# Patient Record
Sex: FEMALE | Race: WHITE | NOT HISPANIC OR LATINO | Employment: FULL TIME | URBAN - METROPOLITAN AREA
[De-identification: names, ages, dates, MRNs, and addresses within clinical notes are randomized per-mention and may not be internally consistent; named-entity substitution may affect disease eponyms.]

---

## 2023-07-31 ENCOUNTER — TELEPHONE (OUTPATIENT)
Dept: PRIMARY CARE | Facility: CLINIC | Age: 48
End: 2023-07-31
Payer: COMMERCIAL

## 2023-07-31 DIAGNOSIS — F41.9 ANXIETY: ICD-10-CM

## 2023-07-31 NOTE — TELEPHONE ENCOUNTER
Rx Refill Request Telephone Encounter    Name:  Leyda Blair  :  [id number]  Medication Name:    BUPROPION 150MG Q/D 90 DAY SUPPLY   VENLAFAXINE 150MG Q/D 90 DAY SUPPLY   Specific Pharmacy location:  [facility]/   Date of last appointment:  2023  Best number to reach patient:  [phone]

## 2023-08-01 RX ORDER — VENLAFAXINE HYDROCHLORIDE 150 MG/1
150 CAPSULE, EXTENDED RELEASE ORAL DAILY
COMMUNITY
Start: 2023-07-15 | End: 2023-08-01 | Stop reason: SDUPTHER

## 2023-08-01 RX ORDER — BUPROPION HYDROCHLORIDE 150 MG/1
150 TABLET ORAL EVERY MORNING
COMMUNITY
Start: 2023-07-29 | End: 2023-08-01 | Stop reason: SDUPTHER

## 2023-08-01 RX ORDER — VENLAFAXINE HYDROCHLORIDE 150 MG/1
150 CAPSULE, EXTENDED RELEASE ORAL DAILY
Qty: 90 CAPSULE | Refills: 1 | Status: SHIPPED | OUTPATIENT
Start: 2023-08-01 | End: 2024-02-12

## 2023-08-01 RX ORDER — BUPROPION HYDROCHLORIDE 150 MG/1
150 TABLET ORAL EVERY MORNING
Qty: 90 TABLET | Refills: 1 | Status: SHIPPED | OUTPATIENT
Start: 2023-08-01 | End: 2024-02-26

## 2023-08-24 ENCOUNTER — OFFICE VISIT (OUTPATIENT)
Dept: PRIMARY CARE | Facility: CLINIC | Age: 48
End: 2023-08-24
Payer: COMMERCIAL

## 2023-08-24 ENCOUNTER — LAB (OUTPATIENT)
Dept: LAB | Facility: LAB | Age: 48
End: 2023-08-24
Payer: COMMERCIAL

## 2023-08-24 VITALS
SYSTOLIC BLOOD PRESSURE: 121 MMHG | DIASTOLIC BLOOD PRESSURE: 82 MMHG | TEMPERATURE: 98.1 F | OXYGEN SATURATION: 97 % | BODY MASS INDEX: 28.16 KG/M2 | HEIGHT: 65 IN | HEART RATE: 89 BPM | WEIGHT: 169 LBS

## 2023-08-24 DIAGNOSIS — R31.9 HEMATURIA, UNSPECIFIED TYPE: ICD-10-CM

## 2023-08-24 DIAGNOSIS — R10.2 PELVIC PAIN: ICD-10-CM

## 2023-08-24 DIAGNOSIS — R10.2 PELVIC PAIN: Primary | ICD-10-CM

## 2023-08-24 LAB
ALANINE AMINOTRANSFERASE (SGPT) (U/L) IN SER/PLAS: 14 U/L (ref 7–45)
ALBUMIN (G/DL) IN SER/PLAS: 4 G/DL (ref 3.4–5)
ALKALINE PHOSPHATASE (U/L) IN SER/PLAS: 74 U/L (ref 33–110)
ANION GAP IN SER/PLAS: 13 MMOL/L (ref 10–20)
ASPARTATE AMINOTRANSFERASE (SGOT) (U/L) IN SER/PLAS: 16 U/L (ref 9–39)
BILIRUBIN TOTAL (MG/DL) IN SER/PLAS: 0.3 MG/DL (ref 0–1.2)
CALCIUM (MG/DL) IN SER/PLAS: 8.7 MG/DL (ref 8.6–10.3)
CARBON DIOXIDE, TOTAL (MMOL/L) IN SER/PLAS: 25 MMOL/L (ref 21–32)
CHLORIDE (MMOL/L) IN SER/PLAS: 105 MMOL/L (ref 98–107)
CREATININE (MG/DL) IN SER/PLAS: 0.73 MG/DL (ref 0.5–1.05)
ERYTHROCYTE DISTRIBUTION WIDTH (RATIO) BY AUTOMATED COUNT: 16.4 % (ref 11.5–14.5)
ERYTHROCYTE MEAN CORPUSCULAR HEMOGLOBIN CONCENTRATION (G/DL) BY AUTOMATED: 29.6 G/DL (ref 32–36)
ERYTHROCYTE MEAN CORPUSCULAR VOLUME (FL) BY AUTOMATED COUNT: 83 FL (ref 80–100)
ERYTHROCYTES (10*6/UL) IN BLOOD BY AUTOMATED COUNT: 4.09 X10E12/L (ref 4–5.2)
GFR FEMALE: >90 ML/MIN/1.73M2
GLUCOSE (MG/DL) IN SER/PLAS: 91 MG/DL (ref 74–99)
HEMATOCRIT (%) IN BLOOD BY AUTOMATED COUNT: 34.1 % (ref 36–46)
HEMOGLOBIN (G/DL) IN BLOOD: 10.1 G/DL (ref 12–16)
LEUKOCYTES (10*3/UL) IN BLOOD BY AUTOMATED COUNT: 7.5 X10E9/L (ref 4.4–11.3)
PLATELETS (10*3/UL) IN BLOOD AUTOMATED COUNT: 400 X10E9/L (ref 150–450)
POC APPEARANCE, URINE: CLEAR
POC BILIRUBIN, URINE: NEGATIVE
POC BLOOD, URINE: NEGATIVE
POC COLOR, URINE: YELLOW
POC GLUCOSE, URINE: NEGATIVE MG/DL
POC KETONES, URINE: NEGATIVE MG/DL
POC LEUKOCYTES, URINE: NEGATIVE
POC NITRITE,URINE: NEGATIVE
POC PH, URINE: 5.5 PH
POC PROTEIN, URINE: NEGATIVE MG/DL
POC SPECIFIC GRAVITY, URINE: 1.01
POC UROBILINOGEN, URINE: 0.2 EU/DL
POTASSIUM (MMOL/L) IN SER/PLAS: 4.4 MMOL/L (ref 3.5–5.3)
PROTEIN TOTAL: 6.3 G/DL (ref 6.4–8.2)
SODIUM (MMOL/L) IN SER/PLAS: 139 MMOL/L (ref 136–145)
UREA NITROGEN (MG/DL) IN SER/PLAS: 15 MG/DL (ref 6–23)

## 2023-08-24 PROCEDURE — 85027 COMPLETE CBC AUTOMATED: CPT

## 2023-08-24 PROCEDURE — 80053 COMPREHEN METABOLIC PANEL: CPT

## 2023-08-24 PROCEDURE — 36415 COLL VENOUS BLD VENIPUNCTURE: CPT

## 2023-08-24 PROCEDURE — 1036F TOBACCO NON-USER: CPT | Performed by: FAMILY MEDICINE

## 2023-08-24 PROCEDURE — 87086 URINE CULTURE/COLONY COUNT: CPT

## 2023-08-24 PROCEDURE — 81003 URINALYSIS AUTO W/O SCOPE: CPT | Performed by: FAMILY MEDICINE

## 2023-08-24 PROCEDURE — 99214 OFFICE O/P EST MOD 30 MIN: CPT | Performed by: FAMILY MEDICINE

## 2023-08-24 RX ORDER — OMEPRAZOLE 20 MG/1
CAPSULE, DELAYED RELEASE ORAL 2 TIMES DAILY
COMMUNITY

## 2023-08-24 ASSESSMENT — ENCOUNTER SYMPTOMS
CHILLS: 0
CONSTIPATION: 1
FEVER: 0
BACK PAIN: 0

## 2023-08-24 NOTE — PROGRESS NOTES
Subjective   Patient ID: Leyda Blair is a 47 y.o. female who presents for Abdominal Pain and Blood in Urine. States her sx's onset 4-5 months ago with with lower pelvic pressure and dysuria; did online research regarding low-carb diets and came across info about it changing the ph level. Pt then began an OTC D-Mannose w/ cranberry for a few weeks and her sx's resolved. Diet has went back to normal and two weeks ago she felt bloated with lower abdominal pains which lasted 1.5 wks. Sx's then resolved up until yesterday; states she was fine all day; went to the gym to exercise and began feeling the urge to urinate. Saw blood in her urine and the lower abdominal pain has returned but not as severe. H/O partial hysterectomy 10 yrs ago.     Blood in urine 2-3 times   Some dysuria yesterday,    No vaginal itching or discharge     Hysterectomy,  for DUB,    Still with ovaries   No kidney stones     BMS every other day              Review of Systems   Constitutional:  Negative for chills and fever.   Gastrointestinal:  Positive for constipation.   Musculoskeletal:  Negative for back pain.       Objective   There were no vitals taken for this visit.    Physical Exam  Constitutional:       Appearance: Normal appearance. She is well-developed.   Cardiovascular:      Rate and Rhythm: Normal rate and regular rhythm.      Heart sounds: Normal heart sounds. No murmur heard.  Pulmonary:      Effort: Pulmonary effort is normal.      Breath sounds: Normal breath sounds.   Abdominal:      General: Abdomen is flat. Bowel sounds are normal.      Palpations: Abdomen is soft. There is no mass.      Hernia: No hernia is present.      Comments: Moderate tenderness left lower quadrant, mid abdomen and suprapubic area no CVAT  No rebound or guarding   Neurological:      General: No focal deficit present.      Mental Status: She is alert.         Assessment/Plan   Problem List Items Addressed This Visit    None  Visit Diagnoses        Pelvic pain    -  Primary    Relevant Orders    POCT UA Automated manually resulted    CBC    Comprehensive Metabolic Panel    CT abdomen pelvis wo IV contrast    Hematuria, unspecified type        Relevant Orders    POCT UA Automated manually resulted    CBC    Comprehensive Metabolic Panel    CT abdomen pelvis wo IV contrast    Urine Culture

## 2023-08-24 NOTE — PATIENT INSTRUCTIONS
Possibly passed kidney stone, ovarian cyst  We will send culture to rule out infection    Get your blood work as ordered.  You should hear from our office with results whether they are normal are not within a few days.  Please call the office if you do not hear from us.     Azo   :  pyridium      Tylenol or motrin for pain     Severe pain to ER

## 2023-08-25 LAB — URINE CULTURE: NORMAL

## 2023-09-11 ENCOUNTER — TELEPHONE (OUTPATIENT)
Dept: PRIMARY CARE | Facility: CLINIC | Age: 48
End: 2023-09-11
Payer: COMMERCIAL

## 2023-09-11 DIAGNOSIS — D64.9 ANEMIA, UNSPECIFIED TYPE: Primary | ICD-10-CM

## 2023-09-11 NOTE — TELEPHONE ENCOUNTER
Pt given ct results.  Pt to take miralax 3-5 times daily, if that does not work, next is a strong laxative andor a enema.  Pt expressed understanding.   
Jayla Liu

## 2023-09-12 ENCOUNTER — LAB (OUTPATIENT)
Dept: LAB | Facility: LAB | Age: 48
End: 2023-09-12
Payer: COMMERCIAL

## 2023-09-12 DIAGNOSIS — D64.9 ANEMIA, UNSPECIFIED TYPE: ICD-10-CM

## 2023-09-12 LAB
COBALAMIN (VITAMIN B12) (PG/ML) IN SER/PLAS: 765 PG/ML (ref 211–911)
ERYTHROCYTE DISTRIBUTION WIDTH (RATIO) BY AUTOMATED COUNT: 17.2 % (ref 11.5–14.5)
ERYTHROCYTE MEAN CORPUSCULAR HEMOGLOBIN CONCENTRATION (G/DL) BY AUTOMATED: 29 G/DL (ref 32–36)
ERYTHROCYTE MEAN CORPUSCULAR VOLUME (FL) BY AUTOMATED COUNT: 85 FL (ref 80–100)
ERYTHROCYTES (10*6/UL) IN BLOOD BY AUTOMATED COUNT: 4.31 X10E12/L (ref 4–5.2)
HEMATOCRIT (%) IN BLOOD BY AUTOMATED COUNT: 36.5 % (ref 36–46)
HEMOGLOBIN (G/DL) IN BLOOD: 10.6 G/DL (ref 12–16)
IRON (UG/DL) IN SER/PLAS: 45 UG/DL (ref 35–150)
IRON BINDING CAPACITY (UG/DL) IN SER/PLAS: 363 UG/DL (ref 240–445)
IRON SATURATION (%) IN SER/PLAS: 12 % (ref 25–45)
LEUKOCYTES (10*3/UL) IN BLOOD BY AUTOMATED COUNT: 5.8 X10E9/L (ref 4.4–11.3)
PLATELETS (10*3/UL) IN BLOOD AUTOMATED COUNT: 376 X10E9/L (ref 150–450)
THYROTROPIN (MIU/L) IN SER/PLAS BY DETECTION LIMIT <= 0.05 MIU/L: 2.42 MIU/L (ref 0.44–3.98)

## 2023-09-12 PROCEDURE — 85027 COMPLETE CBC AUTOMATED: CPT

## 2023-09-12 PROCEDURE — 83550 IRON BINDING TEST: CPT

## 2023-09-12 PROCEDURE — 36415 COLL VENOUS BLD VENIPUNCTURE: CPT

## 2023-09-12 PROCEDURE — 83540 ASSAY OF IRON: CPT

## 2023-09-12 PROCEDURE — 82607 VITAMIN B-12: CPT

## 2023-09-12 PROCEDURE — 84443 ASSAY THYROID STIM HORMONE: CPT

## 2023-09-14 ENCOUNTER — TELEPHONE (OUTPATIENT)
Dept: PRIMARY CARE | Facility: CLINIC | Age: 48
End: 2023-09-14
Payer: COMMERCIAL

## 2023-09-14 NOTE — TELEPHONE ENCOUNTER
Result Communication    Resulted Orders   CBC   Result Value Ref Range    WBC 5.8 4.4 - 11.3 x10E9/L    RBC 4.31 4.00 - 5.20 x10E12/L    Hemoglobin 10.6 (L) 12.0 - 16.0 g/dL    Hematocrit 36.5 36.0 - 46.0 %    MCV 85 80 - 100 fL    MCHC 29.0 (L) 32.0 - 36.0 g/dL    Platelets 376 150 - 450 x10E9/L    RDW 17.2 (H) 11.5 - 14.5 %   Iron and TIBC   Result Value Ref Range    Iron 45 35 - 150 ug/dL    TIBC 363 240 - 445 ug/dL    Iron Saturation 12 (L) 25 - 45 %   Thyroid Stimulating Hormone   Result Value Ref Range    TSH 2.42 0.44 - 3.98 mIU/L      Comment:       TSH testing is performed using different testing    methodology at [facility] than at other    [facility]. Direct result comparisons should    only be made within the same method.       5:06 PM  Emily Kohler MD  [place] [id number] Clinical Support Staff  Still slight anemia but improving, thyroid is okay, B12 pending, would just recommend increasing oral intake of iron in diet    Results were successfully communicated with the patient and they acknowledged their understanding.

## 2023-10-12 DIAGNOSIS — D50.9 IRON DEFICIENCY ANEMIA, UNSPECIFIED IRON DEFICIENCY ANEMIA TYPE: Primary | ICD-10-CM

## 2023-10-13 ENCOUNTER — LAB (OUTPATIENT)
Dept: LAB | Facility: LAB | Age: 48
End: 2023-10-13
Payer: COMMERCIAL

## 2023-10-13 DIAGNOSIS — D50.9 IRON DEFICIENCY ANEMIA, UNSPECIFIED IRON DEFICIENCY ANEMIA TYPE: ICD-10-CM

## 2023-10-13 LAB
ERYTHROCYTE [DISTWIDTH] IN BLOOD BY AUTOMATED COUNT: 16.7 % (ref 11.5–14.5)
HCT VFR BLD AUTO: 35.9 % (ref 36–46)
HGB BLD-MCNC: 10.4 G/DL (ref 12–16)
IRON SATN MFR SERPL: 9 % (ref 25–45)
IRON SERPL-MCNC: 31 UG/DL (ref 35–150)
MCH RBC QN AUTO: 24.7 PG (ref 26–34)
MCHC RBC AUTO-ENTMCNC: 29 G/DL (ref 32–36)
MCV RBC AUTO: 85 FL (ref 80–100)
NRBC BLD-RTO: 0 /100 WBCS (ref 0–0)
PLATELET # BLD AUTO: 321 X10*3/UL (ref 150–450)
PMV BLD AUTO: 11.1 FL (ref 7.5–11.5)
RBC # BLD AUTO: 4.21 X10*6/UL (ref 4–5.2)
TIBC SERPL-MCNC: 344 UG/DL (ref 240–445)
UIBC SERPL-MCNC: 313 UG/DL (ref 110–370)
WBC # BLD AUTO: 4.8 X10*3/UL (ref 4.4–11.3)

## 2023-10-13 PROCEDURE — 36415 COLL VENOUS BLD VENIPUNCTURE: CPT

## 2023-10-13 PROCEDURE — 85027 COMPLETE CBC AUTOMATED: CPT

## 2023-10-13 PROCEDURE — 83540 ASSAY OF IRON: CPT

## 2023-10-13 PROCEDURE — 83550 IRON BINDING TEST: CPT

## 2023-10-16 ENCOUNTER — PATIENT MESSAGE (OUTPATIENT)
Dept: PRIMARY CARE | Facility: CLINIC | Age: 48
End: 2023-10-16
Payer: COMMERCIAL

## 2023-10-16 DIAGNOSIS — D64.9 ANEMIA, UNSPECIFIED TYPE: Primary | ICD-10-CM

## 2023-10-16 RX ORDER — FERROUS SULFATE 325(65) MG
65 TABLET ORAL
Qty: 30 TABLET | Refills: 0 | Status: SHIPPED | OUTPATIENT
Start: 2023-10-16 | End: 2024-02-19 | Stop reason: ALTCHOICE

## 2023-10-16 NOTE — PROGRESS NOTES
Still with some mild iron deficiency, recommend daily iron, prescription sent and repeat blood work in 1 month

## 2023-11-03 ENCOUNTER — LAB (OUTPATIENT)
Dept: LAB | Facility: LAB | Age: 48
End: 2023-11-03
Payer: COMMERCIAL

## 2023-11-03 DIAGNOSIS — D64.9 ANEMIA, UNSPECIFIED TYPE: ICD-10-CM

## 2023-11-03 LAB
ERYTHROCYTE [DISTWIDTH] IN BLOOD BY AUTOMATED COUNT: 17.2 % (ref 11.5–14.5)
HCT VFR BLD AUTO: 37.1 % (ref 36–46)
HGB BLD-MCNC: 10.9 G/DL (ref 12–16)
IRON SATN MFR SERPL: 11 % (ref 25–45)
IRON SERPL-MCNC: 39 UG/DL (ref 35–150)
MCH RBC QN AUTO: 25.8 PG (ref 26–34)
MCHC RBC AUTO-ENTMCNC: 29.4 G/DL (ref 32–36)
MCV RBC AUTO: 88 FL (ref 80–100)
NRBC BLD-RTO: 0 /100 WBCS (ref 0–0)
PLATELET # BLD AUTO: 316 X10*3/UL (ref 150–450)
RBC # BLD AUTO: 4.23 X10*6/UL (ref 4–5.2)
TIBC SERPL-MCNC: 352 UG/DL (ref 240–445)
UIBC SERPL-MCNC: 313 UG/DL (ref 110–370)
WBC # BLD AUTO: 4.7 X10*3/UL (ref 4.4–11.3)

## 2023-11-03 PROCEDURE — 83540 ASSAY OF IRON: CPT

## 2023-11-03 PROCEDURE — 85027 COMPLETE CBC AUTOMATED: CPT

## 2023-11-03 PROCEDURE — 36415 COLL VENOUS BLD VENIPUNCTURE: CPT

## 2023-11-03 PROCEDURE — 83550 IRON BINDING TEST: CPT

## 2023-11-06 DIAGNOSIS — D64.9 ANEMIA, UNSPECIFIED TYPE: Primary | ICD-10-CM

## 2023-11-27 ENCOUNTER — APPOINTMENT (OUTPATIENT)
Dept: HEMATOLOGY/ONCOLOGY | Facility: CLINIC | Age: 48
End: 2023-11-27
Payer: COMMERCIAL

## 2023-11-27 ENCOUNTER — OFFICE VISIT (OUTPATIENT)
Dept: HEMATOLOGY/ONCOLOGY | Facility: CLINIC | Age: 48
End: 2023-11-27
Payer: COMMERCIAL

## 2023-11-27 VITALS
WEIGHT: 162.7 LBS | SYSTOLIC BLOOD PRESSURE: 144 MMHG | OXYGEN SATURATION: 100 % | RESPIRATION RATE: 14 BRPM | HEIGHT: 64 IN | DIASTOLIC BLOOD PRESSURE: 90 MMHG | BODY MASS INDEX: 27.78 KG/M2 | TEMPERATURE: 97 F | HEART RATE: 79 BPM

## 2023-11-27 DIAGNOSIS — D50.9 IRON DEFICIENCY ANEMIA, UNSPECIFIED IRON DEFICIENCY ANEMIA TYPE: Primary | ICD-10-CM

## 2023-11-27 DIAGNOSIS — K90.9 MALABSORPTION OF IRON (HHS-HCC): ICD-10-CM

## 2023-11-27 DIAGNOSIS — D64.9 ANEMIA, UNSPECIFIED TYPE: ICD-10-CM

## 2023-11-27 PROCEDURE — 99215 OFFICE O/P EST HI 40 MIN: CPT | Performed by: PHYSICIAN ASSISTANT

## 2023-11-27 PROCEDURE — 1036F TOBACCO NON-USER: CPT | Performed by: PHYSICIAN ASSISTANT

## 2023-11-27 PROCEDURE — 99205 OFFICE O/P NEW HI 60 MIN: CPT | Performed by: PHYSICIAN ASSISTANT

## 2023-11-27 RX ORDER — FERROUS SULFATE 300 MG/5ML
10 LIQUID (ML) ORAL DAILY
COMMUNITY
End: 2024-02-19 | Stop reason: ALTCHOICE

## 2023-11-27 RX ORDER — ALBUTEROL SULFATE 0.83 MG/ML
3 SOLUTION RESPIRATORY (INHALATION) AS NEEDED
Status: CANCELLED | OUTPATIENT
Start: 2023-12-01

## 2023-11-27 RX ORDER — HEPARIN 100 UNIT/ML
500 SYRINGE INTRAVENOUS AS NEEDED
Status: CANCELLED | OUTPATIENT
Start: 2023-11-27

## 2023-11-27 RX ORDER — DIPHENHYDRAMINE HYDROCHLORIDE 50 MG/ML
50 INJECTION INTRAMUSCULAR; INTRAVENOUS AS NEEDED
Status: CANCELLED | OUTPATIENT
Start: 2023-12-01

## 2023-11-27 RX ORDER — EPINEPHRINE 0.3 MG/.3ML
0.3 INJECTION SUBCUTANEOUS EVERY 5 MIN PRN
Status: CANCELLED | OUTPATIENT
Start: 2023-12-01

## 2023-11-27 RX ORDER — FAMOTIDINE 10 MG/ML
20 INJECTION INTRAVENOUS ONCE AS NEEDED
Status: CANCELLED | OUTPATIENT
Start: 2023-12-01

## 2023-11-27 RX ORDER — HEPARIN SODIUM,PORCINE/PF 10 UNIT/ML
50 SYRINGE (ML) INTRAVENOUS AS NEEDED
Status: CANCELLED | OUTPATIENT
Start: 2023-11-27

## 2023-11-27 ASSESSMENT — ENCOUNTER SYMPTOMS
DIARRHEA: 0
APPETITE CHANGE: 0
WOUND: 0
HEADACHES: 1
ABDOMINAL DISTENTION: 0
FATIGUE: 1
CONFUSION: 0
FREQUENCY: 0
UNEXPECTED WEIGHT CHANGE: 1
HEMATOLOGIC/LYMPHATIC NEGATIVE: 1
DIAPHORESIS: 0
DECREASED CONCENTRATION: 0
NAUSEA: 0
VOMITING: 0
DIFFICULTY URINATING: 0
HEMATURIA: 0
DIZZINESS: 0
CONSTIPATION: 0
NECK PAIN: 0
EYE PROBLEMS: 0
SCLERAL ICTERUS: 0
RESPIRATORY NEGATIVE: 1
NECK STIFFNESS: 0
SPEECH DIFFICULTY: 0
CARDIOVASCULAR NEGATIVE: 1
NUMBNESS: 0
CHILLS: 0
ABDOMINAL PAIN: 0

## 2023-11-27 ASSESSMENT — PAIN SCALES - GENERAL: PAINLEVEL: 0-NO PAIN

## 2023-11-27 NOTE — PROGRESS NOTES
Patient ID: Leyda Blair is a 48 y.o. female.  Primary care physician: Dr. Emily Kohler    Interval History:   Ms. Blair is a 48 years-old female. She is referred to [facility] today for the evaluation and treatment of iron deficiency anemia. Patient states that she had Crissy-En-Y about 10 years ago (2013). States that she has had IV iron infusion in 2019. Patient states that she started oral iron tablets, initially once per day in August and on Nov, 3rd 2023, she switched to oral liquid iron. States that she had partial hysterectomy about 12 years ago due to dysfunctional uterine bleeding. States that she had colonoscopy done less than 10 years ago. Reports that no polyps were removed.     Selected labs prior to the initial consult:   8/25/22: WBC 4.6, Hgb 12.8, MCV 91, platelets 302,000  9/12/23: WBC 5.8, Hgb 10.6, MCV 85, platelets 376,000, iron saturation 12%  10/13/23: WBC 4.8, Hgb 10.4, MCV 85, platelets 321,000, iron saturation 9%  11/3/23: WBC 4.7, Hgb 10.9, MCV 88, platelets 316,000, , iron saturation 11%    8/24/23: CT abdomen/pelvis without contrast  IMPRESSION:  No acute findings in the abdomen and pelvis. Large amount of colonic stool.    Subjective      -Complains of fatigue and headaches.   -Denies any fever. Admits night sweats for one month. Admits 10 lbs weight loss since August.   -Denies feeling any lumps or bumps.   -Denies any chest pain, cough. Complains of HAYES.   -Denies any melena or hematochezia.   -Denies any abnormal vaginal bleeding     Social History     Tobacco Use    Smoking status: Never    Smokeless tobacco: Never   Substance Use Topics    Alcohol use: Not Currently    Drug use: Never        Objective    BMI:   Body mass index is 27.85 kg/m².     Vitals:   Visit Vitals  /90   Pulse 79   Temp 36.1 °C (97 °F)   Resp 14   SpO2 100%   Smoking Status Never        Review of Systems   Constitutional:  Positive for fatigue and unexpected weight change. Negative for appetite  change, chills and diaphoresis.        Lost 10 lbs since Aug, 2023   HENT:   Negative for lump/mass, mouth sores and nosebleeds.    Eyes:  Negative for eye problems and icterus.   Respiratory: Negative.          Admits HAYES   Cardiovascular: Negative.    Gastrointestinal:  Negative for abdominal distention, abdominal pain, constipation, diarrhea, nausea and vomiting.   Genitourinary:  Negative for bladder incontinence, difficulty urinating, frequency and hematuria.    Musculoskeletal:  Negative for gait problem, neck pain and neck stiffness.   Skin:  Negative for itching, rash and wound.   Neurological:  Positive for headaches. Negative for dizziness, gait problem, numbness and speech difficulty.   Hematological: Negative.    Psychiatric/Behavioral:  Negative for confusion and decreased concentration.         Physical Exam  HENT:      Head: Normocephalic.      Nose: Nose normal.      Mouth/Throat:      Mouth: Mucous membranes are moist.   Eyes:      Pupils: Pupils are equal, round, and reactive to light.   Cardiovascular:      Rate and Rhythm: Normal rate and regular rhythm.      Pulses: Normal pulses.      Heart sounds: Normal heart sounds.   Pulmonary:      Effort: Pulmonary effort is normal.      Breath sounds: Normal breath sounds.   Abdominal:      General: Bowel sounds are normal.      Palpations: Abdomen is soft.   Musculoskeletal:         General: Normal range of motion.   Skin:     General: Skin is warm and dry.   Neurological:      General: No focal deficit present.      Mental Status: She is alert and oriented to person, place, and time.   Psychiatric:         Mood and Affect: Mood normal.         Behavior: Behavior normal.       Labs:  Lab Results   Component Value Date    WBC 4.7 11/03/2023    NEUTROABS 1.97 08/25/2022    IGABSOL 0.01 05/23/2022    LYMPHSABS 1.94 08/25/2022    MONOSABS 0.48 08/25/2022    EOSABS 0.12 08/25/2022    BASOSABS 0.05 08/25/2022    RBC 4.23 11/03/2023    MCV 88 11/03/2023     MCHC 29.4 (L) 11/03/2023    HGB 10.9 (L) 11/03/2023    HCT 37.1 11/03/2023     11/03/2023       Lab Results   Component Value Date    CREATININE 0.73 08/24/2023    BUN 15 08/24/2023    EGFR 107 05/23/2022     08/24/2023    K 4.4 08/24/2023     08/24/2023    CO2 25 08/24/2023      Lab Results   Component Value Date    ALT 14 08/24/2023    AST 16 08/24/2023    ALKPHOS 74 08/24/2023    BILITOT 0.3 08/24/2023      Lab Results   Component Value Date    TSH 2.42 09/12/2023     Lab Results   Component Value Date    TSH 2.42 09/12/2023    S2YPXGQ 7.0 08/25/2022     Lab Results   Component Value Date    IRON 39 11/03/2023    TIBC 352 11/03/2023      Lab Results   Component Value Date    [id number] 765 09/12/2023             Performance Status:  Symptomatic; fully ambulatory    Assessment/Plan      Iron deficiency anemia  -Most recent labs on 11/3/23: Hgb 10.9, MCV 88, iron saturation 11%  -Patient had gastric bypass surgery about 10 years ago and had IV iron infusion in 2019  -had partial hysterectomy about 12 years ago. Denies any abnormal vaginal bleeding.   -States that she had colonoscopy done less than 10 years ago. No polyps were removed.   -Due to malabsorption of oral iron, we will schedule patient for IV Venofer infusion 300 mg x 3 times.   -She will have labs done 4 weeks after the last dose of IV iron.   -I will see her back a few days after labs done.       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

## 2023-11-27 NOTE — PATIENT INSTRUCTIONS
We will schedule you for IV iron infusion at [facility]    I will see you back in 2 months to follow up with labs done a few days prior to the next visit.

## 2023-12-01 ENCOUNTER — INFUSION (OUTPATIENT)
Dept: HEMATOLOGY/ONCOLOGY | Facility: CLINIC | Age: 48
End: 2023-12-01
Payer: COMMERCIAL

## 2023-12-01 VITALS
TEMPERATURE: 97.9 F | WEIGHT: 166.89 LBS | RESPIRATION RATE: 18 BRPM | SYSTOLIC BLOOD PRESSURE: 124 MMHG | BODY MASS INDEX: 28.56 KG/M2 | DIASTOLIC BLOOD PRESSURE: 81 MMHG | HEART RATE: 96 BPM

## 2023-12-01 DIAGNOSIS — K90.9 MALABSORPTION OF IRON (HHS-HCC): ICD-10-CM

## 2023-12-01 DIAGNOSIS — D50.9 IRON DEFICIENCY ANEMIA, UNSPECIFIED IRON DEFICIENCY ANEMIA TYPE: ICD-10-CM

## 2023-12-01 DIAGNOSIS — D50.8 OTHER IRON DEFICIENCY ANEMIA: ICD-10-CM

## 2023-12-01 PROCEDURE — 2500000004 HC RX 250 GENERAL PHARMACY W/ HCPCS (ALT 636 FOR OP/ED): Performed by: PHYSICIAN ASSISTANT

## 2023-12-01 PROCEDURE — 96375 TX/PRO/DX INJ NEW DRUG ADDON: CPT | Mod: INF

## 2023-12-01 PROCEDURE — 96374 THER/PROPH/DIAG INJ IV PUSH: CPT | Mod: INF

## 2023-12-01 PROCEDURE — 96365 THER/PROPH/DIAG IV INF INIT: CPT | Mod: INF

## 2023-12-01 RX ORDER — DIPHENHYDRAMINE HYDROCHLORIDE 50 MG/ML
50 INJECTION INTRAMUSCULAR; INTRAVENOUS AS NEEDED
Status: CANCELLED | OUTPATIENT
Start: 2023-12-05

## 2023-12-01 RX ORDER — ALBUTEROL SULFATE 0.83 MG/ML
3 SOLUTION RESPIRATORY (INHALATION) AS NEEDED
Status: CANCELLED | OUTPATIENT
Start: 2023-12-05

## 2023-12-01 RX ORDER — FAMOTIDINE 10 MG/ML
20 INJECTION INTRAVENOUS ONCE AS NEEDED
Status: CANCELLED | OUTPATIENT
Start: 2023-12-05

## 2023-12-01 RX ORDER — EPINEPHRINE 0.3 MG/.3ML
0.3 INJECTION SUBCUTANEOUS EVERY 5 MIN PRN
Status: DISCONTINUED | OUTPATIENT
Start: 2023-12-01 | End: 2023-12-01 | Stop reason: HOSPADM

## 2023-12-01 RX ORDER — HEPARIN 100 UNIT/ML
500 SYRINGE INTRAVENOUS AS NEEDED
OUTPATIENT
Start: 2023-12-01

## 2023-12-01 RX ORDER — ALBUTEROL SULFATE 0.83 MG/ML
3 SOLUTION RESPIRATORY (INHALATION) AS NEEDED
Status: DISCONTINUED | OUTPATIENT
Start: 2023-12-01 | End: 2023-12-01 | Stop reason: HOSPADM

## 2023-12-01 RX ORDER — HEPARIN SODIUM,PORCINE/PF 10 UNIT/ML
50 SYRINGE (ML) INTRAVENOUS AS NEEDED
Status: DISCONTINUED | OUTPATIENT
Start: 2023-12-01 | End: 2023-12-01 | Stop reason: HOSPADM

## 2023-12-01 RX ORDER — FAMOTIDINE 10 MG/ML
20 INJECTION INTRAVENOUS ONCE AS NEEDED
Status: DISCONTINUED | OUTPATIENT
Start: 2023-12-01 | End: 2023-12-01 | Stop reason: HOSPADM

## 2023-12-01 RX ORDER — EPINEPHRINE 0.3 MG/.3ML
0.3 INJECTION SUBCUTANEOUS EVERY 5 MIN PRN
Status: CANCELLED | OUTPATIENT
Start: 2023-12-05

## 2023-12-01 RX ORDER — HEPARIN SODIUM,PORCINE/PF 10 UNIT/ML
50 SYRINGE (ML) INTRAVENOUS AS NEEDED
OUTPATIENT
Start: 2023-12-01

## 2023-12-01 RX ORDER — DIPHENHYDRAMINE HYDROCHLORIDE 50 MG/ML
50 INJECTION INTRAMUSCULAR; INTRAVENOUS AS NEEDED
Status: DISCONTINUED | OUTPATIENT
Start: 2023-12-01 | End: 2023-12-01 | Stop reason: HOSPADM

## 2023-12-01 RX ORDER — HEPARIN 100 UNIT/ML
500 SYRINGE INTRAVENOUS AS NEEDED
Status: DISCONTINUED | OUTPATIENT
Start: 2023-12-01 | End: 2023-12-01 | Stop reason: HOSPADM

## 2023-12-01 RX ADMIN — IRON SUCROSE 300 MG: 20 INJECTION, SOLUTION INTRAVENOUS at 10:41

## 2023-12-01 ASSESSMENT — PAIN SCALES - GENERAL: PAINLEVEL: 0-NO PAIN

## 2023-12-05 ENCOUNTER — INFUSION (OUTPATIENT)
Dept: HEMATOLOGY/ONCOLOGY | Facility: CLINIC | Age: 48
End: 2023-12-05
Payer: COMMERCIAL

## 2023-12-05 VITALS
RESPIRATION RATE: 17 BRPM | OXYGEN SATURATION: 97 % | BODY MASS INDEX: 29.02 KG/M2 | TEMPERATURE: 98.8 F | WEIGHT: 169.97 LBS | DIASTOLIC BLOOD PRESSURE: 86 MMHG | HEART RATE: 91 BPM | HEIGHT: 64 IN | SYSTOLIC BLOOD PRESSURE: 129 MMHG

## 2023-12-05 DIAGNOSIS — K90.9 MALABSORPTION OF IRON (HHS-HCC): ICD-10-CM

## 2023-12-05 DIAGNOSIS — D50.9 IRON DEFICIENCY ANEMIA, UNSPECIFIED IRON DEFICIENCY ANEMIA TYPE: ICD-10-CM

## 2023-12-05 PROCEDURE — 2500000004 HC RX 250 GENERAL PHARMACY W/ HCPCS (ALT 636 FOR OP/ED): Performed by: PHYSICIAN ASSISTANT

## 2023-12-05 PROCEDURE — 96365 THER/PROPH/DIAG IV INF INIT: CPT | Mod: INF

## 2023-12-05 RX ORDER — EPINEPHRINE 0.3 MG/.3ML
0.3 INJECTION SUBCUTANEOUS EVERY 5 MIN PRN
Status: CANCELLED | OUTPATIENT
Start: 2023-12-09

## 2023-12-05 RX ORDER — FAMOTIDINE 10 MG/ML
20 INJECTION INTRAVENOUS ONCE AS NEEDED
Status: CANCELLED | OUTPATIENT
Start: 2023-12-09

## 2023-12-05 RX ORDER — DIPHENHYDRAMINE HYDROCHLORIDE 50 MG/ML
50 INJECTION INTRAMUSCULAR; INTRAVENOUS AS NEEDED
Status: DISCONTINUED | OUTPATIENT
Start: 2023-12-05 | End: 2023-12-05 | Stop reason: HOSPADM

## 2023-12-05 RX ORDER — ALBUTEROL SULFATE 0.83 MG/ML
3 SOLUTION RESPIRATORY (INHALATION) AS NEEDED
Status: DISCONTINUED | OUTPATIENT
Start: 2023-12-05 | End: 2023-12-05 | Stop reason: HOSPADM

## 2023-12-05 RX ORDER — FAMOTIDINE 10 MG/ML
20 INJECTION INTRAVENOUS ONCE AS NEEDED
Status: DISCONTINUED | OUTPATIENT
Start: 2023-12-05 | End: 2023-12-05 | Stop reason: HOSPADM

## 2023-12-05 RX ORDER — DIPHENHYDRAMINE HYDROCHLORIDE 50 MG/ML
50 INJECTION INTRAMUSCULAR; INTRAVENOUS AS NEEDED
Status: CANCELLED | OUTPATIENT
Start: 2023-12-09

## 2023-12-05 RX ORDER — ALBUTEROL SULFATE 0.83 MG/ML
3 SOLUTION RESPIRATORY (INHALATION) AS NEEDED
Status: CANCELLED | OUTPATIENT
Start: 2023-12-09

## 2023-12-05 RX ORDER — EPINEPHRINE 0.3 MG/.3ML
0.3 INJECTION SUBCUTANEOUS EVERY 5 MIN PRN
Status: DISCONTINUED | OUTPATIENT
Start: 2023-12-05 | End: 2023-12-05 | Stop reason: HOSPADM

## 2023-12-05 RX ADMIN — IRON SUCROSE 300 MG: 20 INJECTION, SOLUTION INTRAVENOUS at 15:41

## 2023-12-05 ASSESSMENT — PAIN SCALES - GENERAL: PAINLEVEL: 0-NO PAIN

## 2023-12-11 ENCOUNTER — INFUSION (OUTPATIENT)
Dept: HEMATOLOGY/ONCOLOGY | Facility: CLINIC | Age: 48
End: 2023-12-11
Payer: COMMERCIAL

## 2023-12-11 VITALS
TEMPERATURE: 98.8 F | SYSTOLIC BLOOD PRESSURE: 128 MMHG | HEART RATE: 88 BPM | DIASTOLIC BLOOD PRESSURE: 86 MMHG | WEIGHT: 167.11 LBS | BODY MASS INDEX: 28.6 KG/M2 | RESPIRATION RATE: 18 BRPM | OXYGEN SATURATION: 97 %

## 2023-12-11 DIAGNOSIS — K90.9 MALABSORPTION OF IRON (HHS-HCC): ICD-10-CM

## 2023-12-11 DIAGNOSIS — D50.9 IRON DEFICIENCY ANEMIA, UNSPECIFIED IRON DEFICIENCY ANEMIA TYPE: ICD-10-CM

## 2023-12-11 PROCEDURE — 2500000004 HC RX 250 GENERAL PHARMACY W/ HCPCS (ALT 636 FOR OP/ED): Performed by: PHYSICIAN ASSISTANT

## 2023-12-11 PROCEDURE — 96365 THER/PROPH/DIAG IV INF INIT: CPT | Mod: INF

## 2023-12-11 RX ORDER — FAMOTIDINE 10 MG/ML
20 INJECTION INTRAVENOUS ONCE AS NEEDED
OUTPATIENT
Start: 2023-12-13

## 2023-12-11 RX ORDER — EPINEPHRINE 0.3 MG/.3ML
0.3 INJECTION SUBCUTANEOUS EVERY 5 MIN PRN
OUTPATIENT
Start: 2023-12-13

## 2023-12-11 RX ORDER — DIPHENHYDRAMINE HYDROCHLORIDE 50 MG/ML
50 INJECTION INTRAMUSCULAR; INTRAVENOUS AS NEEDED
OUTPATIENT
Start: 2023-12-13

## 2023-12-11 RX ORDER — EPINEPHRINE 0.3 MG/.3ML
0.3 INJECTION SUBCUTANEOUS EVERY 5 MIN PRN
Status: DISCONTINUED | OUTPATIENT
Start: 2023-12-11 | End: 2023-12-11 | Stop reason: HOSPADM

## 2023-12-11 RX ORDER — DIPHENHYDRAMINE HYDROCHLORIDE 50 MG/ML
50 INJECTION INTRAMUSCULAR; INTRAVENOUS AS NEEDED
Status: DISCONTINUED | OUTPATIENT
Start: 2023-12-11 | End: 2023-12-11 | Stop reason: HOSPADM

## 2023-12-11 RX ORDER — ALBUTEROL SULFATE 0.83 MG/ML
3 SOLUTION RESPIRATORY (INHALATION) AS NEEDED
Status: DISCONTINUED | OUTPATIENT
Start: 2023-12-11 | End: 2023-12-11 | Stop reason: HOSPADM

## 2023-12-11 RX ORDER — FAMOTIDINE 10 MG/ML
20 INJECTION INTRAVENOUS ONCE AS NEEDED
Status: DISCONTINUED | OUTPATIENT
Start: 2023-12-11 | End: 2023-12-11 | Stop reason: HOSPADM

## 2023-12-11 RX ORDER — ALBUTEROL SULFATE 0.83 MG/ML
3 SOLUTION RESPIRATORY (INHALATION) AS NEEDED
OUTPATIENT
Start: 2023-12-13

## 2023-12-11 RX ADMIN — IRON SUCROSE 300 MG: 20 INJECTION, SOLUTION INTRAVENOUS at 15:54

## 2023-12-11 ASSESSMENT — PAIN SCALES - GENERAL: PAINLEVEL: 0-NO PAIN

## 2023-12-12 ENCOUNTER — APPOINTMENT (OUTPATIENT)
Dept: HEMATOLOGY/ONCOLOGY | Facility: CLINIC | Age: 48
End: 2023-12-12
Payer: COMMERCIAL

## 2023-12-29 ENCOUNTER — LAB (OUTPATIENT)
Dept: LAB | Facility: LAB | Age: 48
End: 2023-12-29
Payer: COMMERCIAL

## 2023-12-29 DIAGNOSIS — D50.9 IRON DEFICIENCY ANEMIA, UNSPECIFIED IRON DEFICIENCY ANEMIA TYPE: ICD-10-CM

## 2023-12-29 LAB
BASOPHILS # BLD AUTO: 0.04 X10*3/UL (ref 0–0.1)
BASOPHILS NFR BLD AUTO: 0.9 %
EOSINOPHIL # BLD AUTO: 0.1 X10*3/UL (ref 0–0.7)
EOSINOPHIL NFR BLD AUTO: 2.2 %
ERYTHROCYTE [DISTWIDTH] IN BLOOD BY AUTOMATED COUNT: 17 % (ref 11.5–14.5)
FERRITIN SERPL-MCNC: 115 NG/ML (ref 8–150)
HCT VFR BLD AUTO: 43.8 % (ref 36–46)
HGB BLD-MCNC: 13.4 G/DL (ref 12–16)
IMM GRANULOCYTES # BLD AUTO: 0.02 X10*3/UL (ref 0–0.7)
IMM GRANULOCYTES NFR BLD AUTO: 0.4 % (ref 0–0.9)
IRON SATN MFR SERPL: 19 % (ref 25–45)
IRON SERPL-MCNC: 54 UG/DL (ref 35–150)
LYMPHOCYTES # BLD AUTO: 1.77 X10*3/UL (ref 1.2–4.8)
LYMPHOCYTES NFR BLD AUTO: 38.7 %
MCH RBC QN AUTO: 27.7 PG (ref 26–34)
MCHC RBC AUTO-ENTMCNC: 30.6 G/DL (ref 32–36)
MCV RBC AUTO: 91 FL (ref 80–100)
MONOCYTES # BLD AUTO: 0.51 X10*3/UL (ref 0.1–1)
MONOCYTES NFR BLD AUTO: 11.2 %
NEUTROPHILS # BLD AUTO: 2.13 X10*3/UL (ref 1.2–7.7)
NEUTROPHILS NFR BLD AUTO: 46.6 %
NRBC BLD-RTO: 0 /100 WBCS (ref 0–0)
PLATELET # BLD AUTO: 236 X10*3/UL (ref 150–450)
RBC # BLD AUTO: 4.83 X10*6/UL (ref 4–5.2)
TIBC SERPL-MCNC: 291 UG/DL (ref 240–445)
UIBC SERPL-MCNC: 237 UG/DL (ref 110–370)
WBC # BLD AUTO: 4.6 X10*3/UL (ref 4.4–11.3)

## 2023-12-29 PROCEDURE — 83540 ASSAY OF IRON: CPT

## 2023-12-29 PROCEDURE — 83550 IRON BINDING TEST: CPT

## 2023-12-29 PROCEDURE — 82728 ASSAY OF FERRITIN: CPT

## 2023-12-29 PROCEDURE — 85025 COMPLETE CBC W/AUTO DIFF WBC: CPT

## 2023-12-29 PROCEDURE — 82607 VITAMIN B-12: CPT

## 2023-12-29 PROCEDURE — 36415 COLL VENOUS BLD VENIPUNCTURE: CPT

## 2023-12-30 LAB — VIT B12 SERPL-MCNC: 677 PG/ML (ref 211–911)

## 2024-01-22 DIAGNOSIS — D50.8 OTHER IRON DEFICIENCY ANEMIA: Primary | ICD-10-CM

## 2024-01-24 ENCOUNTER — LAB (OUTPATIENT)
Dept: LAB | Facility: LAB | Age: 49
End: 2024-01-24
Payer: COMMERCIAL

## 2024-01-24 DIAGNOSIS — D50.8 OTHER IRON DEFICIENCY ANEMIA: ICD-10-CM

## 2024-01-24 LAB
BASOPHILS # BLD AUTO: 0.06 X10*3/UL (ref 0–0.1)
BASOPHILS NFR BLD AUTO: 1.3 %
EOSINOPHIL # BLD AUTO: 0.17 X10*3/UL (ref 0–0.7)
EOSINOPHIL NFR BLD AUTO: 3.8 %
ERYTHROCYTE [DISTWIDTH] IN BLOOD BY AUTOMATED COUNT: 15.9 % (ref 11.5–14.5)
FERRITIN SERPL-MCNC: 59 NG/ML (ref 8–150)
HCT VFR BLD AUTO: 43.3 % (ref 36–46)
HGB BLD-MCNC: 13.5 G/DL (ref 12–16)
IMM GRANULOCYTES # BLD AUTO: 0 X10*3/UL (ref 0–0.7)
IMM GRANULOCYTES NFR BLD AUTO: 0 % (ref 0–0.9)
IRON SATN MFR SERPL: 41 % (ref 25–45)
IRON SERPL-MCNC: 112 UG/DL (ref 35–150)
LYMPHOCYTES # BLD AUTO: 1.99 X10*3/UL (ref 1.2–4.8)
LYMPHOCYTES NFR BLD AUTO: 44.1 %
MCH RBC QN AUTO: 28.5 PG (ref 26–34)
MCHC RBC AUTO-ENTMCNC: 31.2 G/DL (ref 32–36)
MCV RBC AUTO: 92 FL (ref 80–100)
MONOCYTES # BLD AUTO: 0.39 X10*3/UL (ref 0.1–1)
MONOCYTES NFR BLD AUTO: 8.6 %
NEUTROPHILS # BLD AUTO: 1.9 X10*3/UL (ref 1.2–7.7)
NEUTROPHILS NFR BLD AUTO: 42.2 %
NRBC BLD-RTO: 0 /100 WBCS (ref 0–0)
PLATELET # BLD AUTO: 266 X10*3/UL (ref 150–450)
RBC # BLD AUTO: 4.73 X10*6/UL (ref 4–5.2)
TIBC SERPL-MCNC: 274 UG/DL (ref 240–445)
UIBC SERPL-MCNC: 162 UG/DL (ref 110–370)
VIT B12 SERPL-MCNC: 554 PG/ML (ref 211–911)
WBC # BLD AUTO: 4.5 X10*3/UL (ref 4.4–11.3)

## 2024-01-24 PROCEDURE — 85025 COMPLETE CBC W/AUTO DIFF WBC: CPT

## 2024-01-24 PROCEDURE — 83550 IRON BINDING TEST: CPT

## 2024-01-24 PROCEDURE — 82607 VITAMIN B-12: CPT

## 2024-01-24 PROCEDURE — 36415 COLL VENOUS BLD VENIPUNCTURE: CPT

## 2024-01-24 PROCEDURE — 82728 ASSAY OF FERRITIN: CPT

## 2024-01-24 PROCEDURE — 83540 ASSAY OF IRON: CPT

## 2024-01-29 ENCOUNTER — OFFICE VISIT (OUTPATIENT)
Dept: HEMATOLOGY/ONCOLOGY | Facility: CLINIC | Age: 49
End: 2024-01-29
Payer: COMMERCIAL

## 2024-01-29 VITALS
TEMPERATURE: 97.7 F | RESPIRATION RATE: 14 BRPM | OXYGEN SATURATION: 95 % | SYSTOLIC BLOOD PRESSURE: 133 MMHG | WEIGHT: 164.02 LBS | DIASTOLIC BLOOD PRESSURE: 85 MMHG | HEART RATE: 87 BPM | BODY MASS INDEX: 28.07 KG/M2

## 2024-01-29 DIAGNOSIS — D50.9 IRON DEFICIENCY ANEMIA, UNSPECIFIED IRON DEFICIENCY ANEMIA TYPE: ICD-10-CM

## 2024-01-29 PROCEDURE — 1036F TOBACCO NON-USER: CPT | Performed by: PHYSICIAN ASSISTANT

## 2024-01-29 PROCEDURE — 99214 OFFICE O/P EST MOD 30 MIN: CPT | Performed by: PHYSICIAN ASSISTANT

## 2024-01-29 ASSESSMENT — ENCOUNTER SYMPTOMS
EYE PROBLEMS: 0
CONFUSION: 0
VOMITING: 0
SPEECH DIFFICULTY: 0
NECK PAIN: 0
HEMATURIA: 0
RESPIRATORY NEGATIVE: 1
UNEXPECTED WEIGHT CHANGE: 0
CONSTIPATION: 0
ABDOMINAL PAIN: 0
NAUSEA: 0
DIAPHORESIS: 0
NECK STIFFNESS: 0
HEADACHES: 0
HEMATOLOGIC/LYMPHATIC NEGATIVE: 1
WOUND: 0
CHILLS: 0
APPETITE CHANGE: 0
SCLERAL ICTERUS: 0
DIARRHEA: 0
CARDIOVASCULAR NEGATIVE: 1
FATIGUE: 0
NUMBNESS: 0
DIZZINESS: 0
DIFFICULTY URINATING: 0
DECREASED CONCENTRATION: 0
ABDOMINAL DISTENTION: 0
FREQUENCY: 0

## 2024-01-29 ASSESSMENT — PAIN SCALES - GENERAL: PAINLEVEL: 0-NO PAIN

## 2024-01-29 NOTE — PATIENT INSTRUCTIONS
Your anemia has resolved.     We will see you back in 4 months to follow up with labs done a few days piror to the next visit.

## 2024-01-29 NOTE — PROGRESS NOTES
Patient ID: Leyda Blair is a 48 y.o. female.  Primary care physician: Dr. Emily Kohler    Interval History:   Ms. Blair is a 48 years-old female. She is referred to [facility] today for the evaluation and treatment of iron deficiency anemia. Patient states that she had Crissy-En-Y about 10 years ago (2013). States that she has had IV iron infusion in 2019. Patient states that she started oral iron tablets, initially once per day in August and on Nov, 3rd 2023, she switched to oral liquid iron. States that she had partial hysterectomy about 12 years ago due to dysfunctional uterine bleeding. States that she had colonoscopy done less than 10 years ago. Reports that no polyps were removed.     Initial Presentation;   -Complains of fatigue and headaches.   -Denies any fever. Admits night sweats for one month. Admits 10 lbs weight loss since August.   -Denies feeling any lumps or bumps.   -Denies any chest pain, cough. Complains of HAYES.   -Denies any melena or hematochezia.   -Denies any abnormal vaginal bleeding     Selected labs prior to the initial consult:   8/25/22: WBC 4.6, Hgb 12.8, MCV 91, platelets 302,000  9/12/23: WBC 5.8, Hgb 10.6, MCV 85, platelets 376,000, iron saturation 12%  10/13/23: WBC 4.8, Hgb 10.4, MCV 85, platelets 321,000, iron saturation 9%  11/3/23: WBC 4.7, Hgb 10.9, MCV 88, platelets 316,000, , iron saturation 11%    8/24/23: CT abdomen/pelvis without contrast  IMPRESSION:  No acute findings in the abdomen and pelvis. Large amount of colonic stool.    12/1, 12/5 and 12/11/23: Venofer 300 mg x 3 times.     Subjective    -States that she tolerates IV iron infusion well.   -States that her energy level has improved with IV iron.   -States that she has less headaches and dizziness.   -Denies any other new health concerns.     Social History     Tobacco Use    Smoking status: Never     Passive exposure: Never    Smokeless tobacco: Never   Substance Use Topics    Alcohol use: Not Currently     Drug use: Never        Objective    BMI:   Body mass index is 28.07 kg/m².     Vitals:   Visit Vitals  Smoking Status Never     Vitals:    01/29/24 0914   BP: 133/85   Pulse: 87   Resp: 14   Temp: 36.5 °C (97.7 °F)   SpO2: 95%        Review of Systems   Constitutional:  Negative for appetite change, chills, diaphoresis, fatigue and unexpected weight change.        Lost 10 lbs since Aug, 2023   HENT:   Negative for lump/mass, mouth sores and nosebleeds.    Eyes:  Negative for eye problems and icterus.   Respiratory: Negative.          Admits HAYES   Cardiovascular: Negative.    Gastrointestinal:  Negative for abdominal distention, abdominal pain, constipation, diarrhea, nausea and vomiting.   Genitourinary:  Negative for bladder incontinence, difficulty urinating, frequency and hematuria.    Musculoskeletal:  Negative for gait problem, neck pain and neck stiffness.   Skin:  Negative for itching, rash and wound.   Neurological:  Negative for dizziness, gait problem, headaches, numbness and speech difficulty.   Hematological: Negative.    Psychiatric/Behavioral:  Negative for confusion and decreased concentration.         Physical Exam  HENT:      Head: Normocephalic.      Nose: Nose normal.      Mouth/Throat:      Mouth: Mucous membranes are moist.   Eyes:      Pupils: Pupils are equal, round, and reactive to light.   Cardiovascular:      Rate and Rhythm: Normal rate and regular rhythm.      Pulses: Normal pulses.      Heart sounds: Normal heart sounds.   Pulmonary:      Effort: Pulmonary effort is normal.      Breath sounds: Normal breath sounds.   Musculoskeletal:         General: Normal range of motion.   Skin:     General: Skin is warm and dry.   Neurological:      General: No focal deficit present.      Mental Status: She is alert and oriented to person, place, and time.   Psychiatric:         Mood and Affect: Mood normal.         Behavior: Behavior normal.         Labs:  Lab Results   Component Value Date    WBC  4.5 01/24/2024    NEUTROABS 1.90 01/24/2024    IGABSOL 0.00 01/24/2024    LYMPHSABS 1.99 01/24/2024    MONOSABS 0.39 01/24/2024    EOSABS 0.17 01/24/2024    BASOSABS 0.06 01/24/2024    RBC 4.73 01/24/2024    MCV 92 01/24/2024    MCHC 31.2 (L) 01/24/2024    HGB 13.5 01/24/2024    HCT 43.3 01/24/2024     01/24/2024       Lab Results   Component Value Date    CREATININE 0.73 08/24/2023    BUN 15 08/24/2023    EGFR 107 05/23/2022     08/24/2023    K 4.4 08/24/2023     08/24/2023    CO2 25 08/24/2023      Lab Results   Component Value Date    ALT 14 08/24/2023    AST 16 08/24/2023    ALKPHOS 74 08/24/2023    BILITOT 0.3 08/24/2023      Lab Results   Component Value Date    TSH 2.42 09/12/2023     Lab Results   Component Value Date    TSH 2.42 09/12/2023    D3BGEQS 7.0 08/25/2022     Lab Results   Component Value Date    IRON 112 01/24/2024    TIBC 274 01/24/2024    FERRITIN 59 01/24/2024      Lab Results   Component Value Date    LIGOLREY77 554 01/24/2024             Performance Status:  Symptomatic; fully ambulatory    Assessment/Plan      Iron deficiency anemia  -Most recent labs prior to the initial visit was done on 11/3/23: Hgb 10.9, MCV 88, iron saturation 11%  -Patient had gastric bypass surgery about 10 years ago and had IV iron infusion in 2019  -had partial hysterectomy about 12 years ago. Denies any abnormal vaginal bleeding.   -States that she had colonoscopy done less than 10 years ago. No polyps were removed.   -Due to malabsorption of oral iron, patient received Venofer infusion on 12/1, 12/5 and 12/11/23  -Labs done on 1/24/24: Hgb 13.5, MCV 92, ferritin 59, iron saturation 41%  -Anemia has resolved. No further IV iron infusion is needed.   -Due to iron malabsorption, we will continue to monitor her blood counts.   -Initially, we will see her back in 4 months to follow up with CBC, iron panel and ferritin done a few days prior to the next visit.   -If Her Hgb and iron levels remain  stable at next follow up, we may extend her follow up interval.     Problem List Items Addressed This Visit             ICD-10-CM    Iron deficiency anemia D50.9    Relevant Orders    Clinic Appointment Request Follow Up; ASHLEY CASTORENA; [facility] MEDON    Iron and TIBC    Ferritin    CBC and Auto Differential          Ashley Castorena PA-C          HPI

## 2024-02-10 DIAGNOSIS — F41.9 ANXIETY: ICD-10-CM

## 2024-02-12 PROBLEM — E66.9 OBESITY, UNSPECIFIED: Status: ACTIVE | Noted: 2024-02-12

## 2024-02-12 PROBLEM — R53.81 MALAISE AND FATIGUE: Status: ACTIVE | Noted: 2024-02-12

## 2024-02-12 PROBLEM — R63.2 INCREASED APPETITE: Status: ACTIVE | Noted: 2024-02-12

## 2024-02-12 PROBLEM — K44.9 HIATAL HERNIA: Status: ACTIVE | Noted: 2024-02-12

## 2024-02-12 PROBLEM — H93.13 TINNITUS OF BOTH EARS: Status: ACTIVE | Noted: 2024-02-12

## 2024-02-12 PROBLEM — H91.10 PRESBYACUSIS: Status: ACTIVE | Noted: 2024-02-12

## 2024-02-12 PROBLEM — F41.9 ANXIETY: Status: ACTIVE | Noted: 2024-02-12

## 2024-02-12 PROBLEM — Z90.710 H/O: HYSTERECTOMY: Status: ACTIVE | Noted: 2024-02-12

## 2024-02-12 PROBLEM — Z98.84 S/P BARIATRIC SURGERY: Status: ACTIVE | Noted: 2024-02-12

## 2024-02-12 PROBLEM — K21.9 ESOPHAGEAL REFLUX: Status: ACTIVE | Noted: 2024-02-12

## 2024-02-12 PROBLEM — Z90.3 INTESTINAL MALABSORPTION FOLLOWING GASTRECTOMY (HHS-HCC): Status: ACTIVE | Noted: 2024-02-12

## 2024-02-12 PROBLEM — H61.23 BILATERAL IMPACTED CERUMEN: Status: RESOLVED | Noted: 2024-02-12 | Resolved: 2024-02-12

## 2024-02-12 PROBLEM — O03.9 SPONTANEOUS ABORTION (HHS-HCC): Status: ACTIVE | Noted: 2024-02-12

## 2024-02-12 PROBLEM — R31.9 HEMATURIA: Status: ACTIVE | Noted: 2023-08-24

## 2024-02-12 PROBLEM — R53.83 MALAISE AND FATIGUE: Status: ACTIVE | Noted: 2024-02-12

## 2024-02-12 PROBLEM — K91.2 INTESTINAL MALABSORPTION FOLLOWING GASTRECTOMY (HHS-HCC): Status: ACTIVE | Noted: 2024-02-12

## 2024-02-12 PROBLEM — K59.00 CONSTIPATION: Status: ACTIVE | Noted: 2024-02-12

## 2024-02-12 PROBLEM — D64.9 ANEMIA: Status: ACTIVE | Noted: 2023-08-14

## 2024-02-12 RX ORDER — VENLAFAXINE HYDROCHLORIDE 150 MG/1
150 CAPSULE, EXTENDED RELEASE ORAL DAILY
Qty: 90 CAPSULE | Refills: 1 | Status: SHIPPED | OUTPATIENT
Start: 2024-02-12

## 2024-02-19 ENCOUNTER — OFFICE VISIT (OUTPATIENT)
Dept: PRIMARY CARE | Facility: CLINIC | Age: 49
End: 2024-02-19
Payer: COMMERCIAL

## 2024-02-19 VITALS
HEART RATE: 77 BPM | OXYGEN SATURATION: 97 % | TEMPERATURE: 98.4 F | BODY MASS INDEX: 29.02 KG/M2 | DIASTOLIC BLOOD PRESSURE: 85 MMHG | WEIGHT: 170 LBS | HEIGHT: 64 IN | SYSTOLIC BLOOD PRESSURE: 130 MMHG

## 2024-02-19 DIAGNOSIS — B02.9 HERPES ZOSTER WITHOUT COMPLICATION: Primary | ICD-10-CM

## 2024-02-19 PROCEDURE — 1036F TOBACCO NON-USER: CPT | Performed by: FAMILY MEDICINE

## 2024-02-19 PROCEDURE — 99213 OFFICE O/P EST LOW 20 MIN: CPT | Performed by: FAMILY MEDICINE

## 2024-02-19 RX ORDER — GABAPENTIN 300 MG/1
300 CAPSULE ORAL NIGHTLY
Qty: 30 CAPSULE | Refills: 2 | Status: SHIPPED | OUTPATIENT
Start: 2024-02-19 | End: 2024-05-19

## 2024-02-19 RX ORDER — ACYCLOVIR 800 MG/1
800 TABLET ORAL
Qty: 35 TABLET | Refills: 0 | Status: SHIPPED | OUTPATIENT
Start: 2024-02-19 | End: 2024-02-26

## 2024-02-19 ASSESSMENT — ENCOUNTER SYMPTOMS
COUGH: 0
FEVER: 0

## 2024-02-19 ASSESSMENT — PATIENT HEALTH QUESTIONNAIRE - PHQ9
SUM OF ALL RESPONSES TO PHQ9 QUESTIONS 1 AND 2: 0
1. LITTLE INTEREST OR PLEASURE IN DOING THINGS: NOT AT ALL
2. FEELING DOWN, DEPRESSED OR HOPELESS: NOT AT ALL

## 2024-02-19 NOTE — PROGRESS NOTES
"Subjective   Patient ID: Leyda Blair is a 48 y.o. female who presents for Rash on the right mid-portion of her back; states it burns and is very painful. Sx's onset one week ago.    1 week   Painful, itching , burning   Shooting pains at times     Was sick 2 weeks ago   Possibly covid     No pain meds     Minimal relief with tylenol   History of full gastric bypass so limited with anti-inflammatories         Review of Systems   Constitutional:  Negative for fever.   Respiratory:  Negative for cough.        Objective   /85   Pulse 77   Temp 36.9 °C (98.4 °F)   Ht 1.626 m (5' 4\")   Wt 77.1 kg (170 lb)   SpO2 97%   BMI 29.18 kg/m²     Physical Exam  Constitutional:       Appearance: Normal appearance. She is well-developed.   Cardiovascular:      Rate and Rhythm: Normal rate and regular rhythm.      Heart sounds: Normal heart sounds. No murmur heard.  Pulmonary:      Effort: Pulmonary effort is normal.      Breath sounds: Normal breath sounds.   Skin:     Comments: Erythematous vesicles and papules along the right posterior T4 distribution, tender to touch  No rashes along the anterior chest   Neurological:      General: No focal deficit present.      Mental Status: She is alert.   Psychiatric:         Mood and Affect: Mood normal.         Assessment/Plan   Problem List Items Addressed This Visit    None  Visit Diagnoses         Codes    Herpes zoster without complication    -  Primary B02.9    Relevant Medications    acyclovir (Zovirax) 800 mg tablet    gabapentin (Neurontin) 300 mg capsule               "

## 2024-02-19 NOTE — PATIENT INSTRUCTIONS
You have a rash consistent with shingles.  Shingles is a reactivation of chickenpox that remains dormant in your spine.  It has a distinct distribution along the nerve pathway on your body that is usually only on one side of your body.  We can treat the rash with an antiviral medications such as acyclovir if started within 72 hours of the onset of the rash.  The antiviral will diminish the intensity of the rash but will not resolve it.  The rash usually lasts 2-4weeks.  The pain of shingles rash is what is the unpredictable component.  On average people have pain for about a month, however some people can have prolonged pain for months or even longer.  You can try anti-inflammatory medications like Advil or Aleve for the pain.  If that is not sufficient please let us know, we often use a medication called gabapentin which gives people significant relief from the shingles.  The gabapentin however does not work well immediately, you have to take it on a daily basis for it to work.  It also can take several days for it to work.  The gabapentin has a large dose range so we can titrate up as needed to control your pain.  The shingles is contagious by direct contact.  It is important that he keep the rash covered and wash your hands frequently especially if you touch the area.  Once the rash is all scabbed over your no not longer contagious.

## 2024-02-26 DIAGNOSIS — F41.9 ANXIETY: ICD-10-CM

## 2024-02-26 PROBLEM — B02.9 HERPES ZOSTER WITHOUT COMPLICATION: Status: RESOLVED | Noted: 2024-02-26 | Resolved: 2024-02-26

## 2024-02-26 RX ORDER — BUPROPION HYDROCHLORIDE 150 MG/1
150 TABLET ORAL EVERY MORNING
Qty: 90 TABLET | Refills: 0 | Status: SHIPPED | OUTPATIENT
Start: 2024-02-26

## 2024-05-03 ENCOUNTER — DOCUMENTATION (OUTPATIENT)
Dept: HEMATOLOGY/ONCOLOGY | Facility: HOSPITAL | Age: 49
End: 2024-05-03
Payer: COMMERCIAL

## 2024-05-03 NOTE — PROGRESS NOTES
Diagnosis anemia, LADONNA. History includes-anemia, LADONNA, malabsorption of Iron following bariatric surgery, esophageal reflux, anxiety, constipation, fatigue, hiatal hernia.  Patient had last Venofer infusions on 12/1/23, 12/5/23 and 12/11/23.  Former patient of Soo Booker PA-C, last visit 1/29/24.  Last labs 1/24/24 RBC 4.73, HGB 13.5, Hematocrit 43.3, MCHC 31.2, RDW 15.9, Ferritin 59, Iron 112, TIBC 274, Sat 41%.  Patient was left a message with details for appointment scheduled on 6/3/24 with Abhay Arnold PA-C. Call [phone] to cancel or reschedule this appointment.

## 2024-05-24 ENCOUNTER — LAB (OUTPATIENT)
Dept: LAB | Facility: LAB | Age: 49
End: 2024-05-24
Payer: COMMERCIAL

## 2024-05-24 DIAGNOSIS — D50.9 IRON DEFICIENCY ANEMIA, UNSPECIFIED IRON DEFICIENCY ANEMIA TYPE: ICD-10-CM

## 2024-05-24 LAB
BASOPHILS # BLD AUTO: 0.07 X10*3/UL (ref 0–0.1)
BASOPHILS NFR BLD AUTO: 1.4 %
EOSINOPHIL # BLD AUTO: 0.32 X10*3/UL (ref 0–0.7)
EOSINOPHIL NFR BLD AUTO: 6.5 %
ERYTHROCYTE [DISTWIDTH] IN BLOOD BY AUTOMATED COUNT: 12.3 % (ref 11.5–14.5)
FERRITIN SERPL-MCNC: 23 NG/ML (ref 8–150)
HCT VFR BLD AUTO: 42.4 % (ref 36–46)
HGB BLD-MCNC: 13.6 G/DL (ref 12–16)
IMM GRANULOCYTES # BLD AUTO: 0.01 X10*3/UL (ref 0–0.7)
IMM GRANULOCYTES NFR BLD AUTO: 0.2 % (ref 0–0.9)
IRON SATN MFR SERPL: 27 % (ref 25–45)
IRON SERPL-MCNC: 82 UG/DL (ref 35–150)
LYMPHOCYTES # BLD AUTO: 1.88 X10*3/UL (ref 1.2–4.8)
LYMPHOCYTES NFR BLD AUTO: 37.9 %
MCH RBC QN AUTO: 31.2 PG (ref 26–34)
MCHC RBC AUTO-ENTMCNC: 32.1 G/DL (ref 32–36)
MCV RBC AUTO: 97 FL (ref 80–100)
MONOCYTES # BLD AUTO: 0.38 X10*3/UL (ref 0.1–1)
MONOCYTES NFR BLD AUTO: 7.7 %
NEUTROPHILS # BLD AUTO: 2.3 X10*3/UL (ref 1.2–7.7)
NEUTROPHILS NFR BLD AUTO: 46.3 %
NRBC BLD-RTO: 0 /100 WBCS (ref 0–0)
PLATELET # BLD AUTO: 282 X10*3/UL (ref 150–450)
RBC # BLD AUTO: 4.36 X10*6/UL (ref 4–5.2)
TIBC SERPL-MCNC: 300 UG/DL (ref 240–445)
UIBC SERPL-MCNC: 218 UG/DL (ref 110–370)
WBC # BLD AUTO: 5 X10*3/UL (ref 4.4–11.3)

## 2024-05-24 PROCEDURE — 83540 ASSAY OF IRON: CPT

## 2024-05-24 PROCEDURE — 82728 ASSAY OF FERRITIN: CPT

## 2024-05-24 PROCEDURE — 85025 COMPLETE CBC W/AUTO DIFF WBC: CPT

## 2024-05-24 PROCEDURE — 36415 COLL VENOUS BLD VENIPUNCTURE: CPT

## 2024-05-24 PROCEDURE — 83550 IRON BINDING TEST: CPT

## 2024-06-03 ENCOUNTER — OFFICE VISIT (OUTPATIENT)
Dept: HEMATOLOGY/ONCOLOGY | Facility: CLINIC | Age: 49
End: 2024-06-03
Payer: COMMERCIAL

## 2024-06-03 VITALS
OXYGEN SATURATION: 100 % | TEMPERATURE: 98.2 F | BODY MASS INDEX: 29.59 KG/M2 | HEART RATE: 76 BPM | WEIGHT: 172.4 LBS | SYSTOLIC BLOOD PRESSURE: 131 MMHG | RESPIRATION RATE: 18 BRPM | DIASTOLIC BLOOD PRESSURE: 80 MMHG

## 2024-06-03 DIAGNOSIS — Z98.84 S/P BARIATRIC SURGERY: ICD-10-CM

## 2024-06-03 DIAGNOSIS — K91.2 INTESTINAL MALABSORPTION FOLLOWING GASTRECTOMY (HHS-HCC): ICD-10-CM

## 2024-06-03 DIAGNOSIS — Z90.3 INTESTINAL MALABSORPTION FOLLOWING GASTRECTOMY (HHS-HCC): ICD-10-CM

## 2024-06-03 DIAGNOSIS — K90.9 MALABSORPTION OF IRON (HHS-HCC): Primary | ICD-10-CM

## 2024-06-03 DIAGNOSIS — D50.9 IRON DEFICIENCY ANEMIA, UNSPECIFIED IRON DEFICIENCY ANEMIA TYPE: ICD-10-CM

## 2024-06-03 PROCEDURE — 99214 OFFICE O/P EST MOD 30 MIN: CPT | Performed by: PHYSICIAN ASSISTANT

## 2024-06-03 RX ORDER — FAMOTIDINE 10 MG/ML
20 INJECTION INTRAVENOUS ONCE AS NEEDED
OUTPATIENT
Start: 2024-06-12

## 2024-06-03 RX ORDER — EPINEPHRINE 0.3 MG/.3ML
0.3 INJECTION SUBCUTANEOUS EVERY 5 MIN PRN
OUTPATIENT
Start: 2024-06-12

## 2024-06-03 RX ORDER — ALBUTEROL SULFATE 0.83 MG/ML
3 SOLUTION RESPIRATORY (INHALATION) AS NEEDED
OUTPATIENT
Start: 2024-06-12

## 2024-06-03 RX ORDER — DIPHENHYDRAMINE HYDROCHLORIDE 50 MG/ML
50 INJECTION INTRAMUSCULAR; INTRAVENOUS AS NEEDED
OUTPATIENT
Start: 2024-06-12

## 2024-06-03 ASSESSMENT — PAIN SCALES - GENERAL: PAINLEVEL: 0-NO PAIN

## 2024-06-03 NOTE — PROGRESS NOTES
Patient ID: Leyda Blair is a 48 y.o. female.  Referring Physician: Soo Booker PA-C  Office Address Unavailable  as of 2/17/2024  Primary Care Provider: Emily Kohler MD  Visit Type: Follow Up    Location: [facility]  Diagnosis/Reason: LADONNA (2/2 Post-Surgical Malabsorption s/p RYGB)    Interval Hx:  6/3/24  Leyda Blair is a 48 y.o. female following up today for LADONNA (2/2 post-surgical malabsorption s/p RYGB)    NOTE:  6/3/24 - Patient was previously followed by Soo Booker PA-C but is now transferring care to me effective today. Their last visit was 1/29/24 and it was noted that patient had RYGB in 2013. She was also noted for having IV iron infusion in 2019 w/o adverse effect or reaction. She has also tried oral iron tablets and liquid iron supplementation without desired effect. She has also had partial hysterectomy around 2012. With IKE Booker PA-C patient received IV venofer 12/1, 12/5 and 12/11 without adverse effect or reaction.  Patient's recent labs indicate iron deficiency, therefore patient ordered a cycle of IV iron sucrose (venofer) at 300mg IV x 3 once weekly infusions    Patient denies weight loss, abnormal bruising and bleeding, hematuria, blood in stool, dark/black stools, epistaxis, oral/gingival bleeding, lymphadenopathy, recurrent infections, recurrent fevers, night sweats, early satiety, abdominal pain, bone pain, chest pain, palpitations, SOB, HAYES, fatigue, dizziness, lightheadedness, PICA.    Relevant Hx:  1/29/24 - Last Visit w/ H. CHRISTIN Maki-CNP  Ms. Blair is a 48 years-old female. She is referred to [facility] today for the evaluation and treatment of iron deficiency anemia. Patient states that she had Crissy-En-Y about 10 years ago (2013). States that she has had IV iron infusion in 2019. Patient states that she started oral iron tablets, initially once per day in August and on Nov, 3rd 2023, she switched to oral liquid iron. States that she had partial hysterectomy about  12 years ago due to dysfunctional uterine bleeding. States that she had colonoscopy done less than 10 years ago. Reports that no polyps were removed.     PMHx:  Active Ambulatory Problems     Diagnosis Date Noted    Iron deficiency anemia 2023    Malabsorption of iron ([facility]) 2023    Intestinal malabsorption following gastrectomy ([facility]) 2024    Tinnitus of both ears 2024    Spontaneous  ([facility]) 2024    S/P bariatric surgery 2024    Presbyacusis 2024    Obesity, unspecified 2024    Malaise and fatigue 2024    Increased appetite 2024    Hematuria 2023    H/O: hysterectomy 2024    Hiatal hernia 2024    Esophageal reflux 2024    Constipation 2024    Anxiety 2024    Anemia 2023     Resolved Ambulatory Problems     Diagnosis Date Noted    Bilateral impacted cerumen 2024    Herpes zoster without complication 2024     Past Medical History:   Diagnosis Date    Abnormal weight loss 2016    Perianal venous thrombosis 2016    Perianal venous thrombosis 2016    Personal history of diseases of the blood and blood-forming organs and certain disorders involving the immune mechanism 2018    Personal history of other specified conditions 02/15/2019    Personal history of other specified conditions 2016    Right upper quadrant pain 2016    Unspecified contact dermatitis due to plants, except food 2017     PSHx:  Past Surgical History:   Procedure Laterality Date    GASTRIC BYPASS  2013    Gastric Surgery For Morbid Obesity Bypass With Crissy-en-Y    OTHER SURGICAL HISTORY  02/15/2019    Hysterectomy       FHx:  No family history on file.     Social Hx:  Leyda Blair    reports that she has never smoked. She has never been exposed to tobacco smoke. She has never used smokeless tobacco.  She  reports that she does not currently use alcohol.  She  reports no  history of drug use.  Social History     Socioeconomic History    Marital status:      Spouse name: Not on file    Number of children: Not on file    Years of education: Not on file    Highest education level: Not on file   Occupational History    Not on file   Tobacco Use    Smoking status: Never     Passive exposure: Never    Smokeless tobacco: Never   Substance and Sexual Activity    Alcohol use: Not Currently    Drug use: Never    Sexual activity: Not on file   Other Topics Concern    Not on file   Social History Narrative    Not on file     Social Determinants of Health     Financial Resource Strain: Not on file   Food Insecurity: Not on file   Transportation Needs: Not on file   Physical Activity: Not on file   Stress: Not on file   Social Connections: Not on file   Intimate Partner Violence: Not on file   Housing Stability: Not on file     Medications and allergies reviewed in EMR.    ROS:  Review of Systems - Oncology   10 point review of systems negative except as state in HPI.    Vitals & Statistics:  Objective   BSA: There is no height or weight on file to calculate BSA.  There were no vitals taken for this visit.    Physical Exam:  Physical Exam  Vitals and nursing note reviewed.   Constitutional:       Appearance: Normal appearance. She is normal weight.   HENT:      Head: Normocephalic and atraumatic.      Right Ear: External ear normal.      Left Ear: External ear normal.      Nose: Nose normal.      Mouth/Throat:      Mouth: Mucous membranes are moist.      Pharynx: Oropharynx is clear.   Eyes:      Extraocular Movements: Extraocular movements intact.      Conjunctiva/sclera: Conjunctivae normal.      Pupils: Pupils are equal, round, and reactive to light.   Cardiovascular:      Rate and Rhythm: Normal rate and regular rhythm.      Pulses: Normal pulses.      Heart sounds: Normal heart sounds.   Pulmonary:      Effort: Pulmonary effort is normal.      Breath sounds: Normal breath sounds.  "  Abdominal:      General: Abdomen is flat. Bowel sounds are normal.      Palpations: Abdomen is soft.      Comments: No masses or organomegaly palpable during exam   Musculoskeletal:         General: Normal range of motion.      Cervical back: Normal range of motion.   Lymphadenopathy:      Comments: No lymphadenopathy palpable   Skin:     General: Skin is warm and dry.      Capillary Refill: Capillary refill takes less than 2 seconds.   Neurological:      Mental Status: She is alert and oriented to person, place, and time. Mental status is at baseline.   Psychiatric:         Mood and Affect: Mood normal.         Behavior: Behavior normal.         Thought Content: Thought content normal.         Judgment: Judgment normal.           Results:  Lab Results   Component Value Date    WBC 5.0 05/24/2024    NEUTROABS 2.30 05/24/2024    IGABSOL 0.01 05/24/2024    LYMPHSABS 1.88 05/24/2024    MONOSABS 0.38 05/24/2024    EOSABS 0.32 05/24/2024    BASOSABS 0.07 05/24/2024    RBC 4.36 05/24/2024    MCV 97 05/24/2024    MCHC 32.1 05/24/2024    HGB 13.6 05/24/2024    HCT 42.4 05/24/2024     05/24/2024     No results found for: \"RETICCTPCT\"   Lab Results   Component Value Date    CREATININE 0.73 08/24/2023    BUN 15 08/24/2023    EGFR 107 05/23/2022     08/24/2023    K 4.4 08/24/2023     08/24/2023    CO2 25 08/24/2023      Lab Results   Component Value Date    ALT 14 08/24/2023    AST 16 08/24/2023    ALKPHOS 74 08/24/2023    BILITOT 0.3 08/24/2023      Lab Results   Component Value Date    TSH 2.42 09/12/2023     Lab Results   Component Value Date    TSH 2.42 09/12/2023    E7HQLTO 7.0 08/25/2022     Lab Results   Component Value Date    IRON 82 05/24/2024    TIBC 300 05/24/2024    FERRITIN 23 05/24/2024      Lab Results   Component Value Date    GQPPCTOF60 554 01/24/2024      No results found for: \"FOLATE\"  No results found for: \"GREGG\", \"RF\", \"SEDRATE\"   No results found for: \"CRP\"   No results found for: " "\"MINOO\"  No results found for: \"LDH\"  No results found for: \"HAPTOGLOBIN\"  No results found for: \"SPEP\"  No results found for: \"IGG\", \"IGM\", \"IGA\"  No results found for: \"HEPATOT\", \"HEPAIGM\", \"HEPBCIGM\", \"HEPBCAB\", \"HEPBSAG\", \"HEPCAB\"  No results found for: \"HIV1X2\"    Assessment:  Leyda Blair is a 48 y.o. female following up today for LADONNA (2/2 post-surgical malabsorption)    I reviewed patient's chart including but not limited to labs, imaging, surgical/procedure notes, pathology, hospital notes, doctor's notes.    5/24/24 results:  WBC & Differential WNL  RBC indices & H&H WNL  Platelets WNL  Iron studies: Ferritin low for this practice at 23, Iron and TIBC WNL, %Sat WNL at 27%    Plan:  LADONNA 2/2 Post-Surgical Malabsorption s/p RYGB  Lab results pending - Will review when available and address adverse results as needed  Request prior authorization for IV iron sucrose (Venofer) 300mg weekly x 3  Dx Codes: D64.9 - Anemia, E61.1 - Iron Deficiency, K91.2 - Post-surgical malabsorption, Z98.84 - S/P Bariatric Sx  Anticipated start date: 6/17, 6/24, 6/31  Will switch to IV ferumoxytol (Feraheme) 510mg weekly x 2 if preferred by insurance  Recheck labs in 2 months after final IV iron infusion  CBC-D, Iron studies  RTC in 4 months via in-person visit - F/U sooner if needed/urgent  CBC-D, Iron studies up to 1 week prior    I had an extensive discussion with the patient regarding the diagnosis and discussed the plan of therapy, including general considerations regarding side effects and outcomes. Pt understood and gave appropriate teach back about the plan of care. All questions were answered to the patient's satisfaction. The patient is instructed to contact us at any time if questions or problems arise. Thank you for the opportunity to participate in the care of this very pleasant patient.    Total time = 30 minutes. 50% or more of this time was spent in counseling and/or coordination of care including reviewing medical " history/radiology/labs, examining patient, formulating outlined plan with team, and discussing plan with patient/family.    Abhay Arnold PA-C

## 2024-06-13 ENCOUNTER — INFUSION (OUTPATIENT)
Dept: HEMATOLOGY/ONCOLOGY | Facility: CLINIC | Age: 49
End: 2024-06-13
Payer: COMMERCIAL

## 2024-06-13 VITALS
RESPIRATION RATE: 18 BRPM | HEART RATE: 80 BPM | OXYGEN SATURATION: 98 % | BODY MASS INDEX: 29.88 KG/M2 | HEIGHT: 64 IN | DIASTOLIC BLOOD PRESSURE: 85 MMHG | TEMPERATURE: 98.6 F | WEIGHT: 175.04 LBS | SYSTOLIC BLOOD PRESSURE: 135 MMHG

## 2024-06-13 DIAGNOSIS — Z90.3 INTESTINAL MALABSORPTION FOLLOWING GASTRECTOMY (HHS-HCC): ICD-10-CM

## 2024-06-13 DIAGNOSIS — Z98.84 S/P BARIATRIC SURGERY: ICD-10-CM

## 2024-06-13 DIAGNOSIS — K90.9 MALABSORPTION OF IRON (HHS-HCC): ICD-10-CM

## 2024-06-13 DIAGNOSIS — D50.9 IRON DEFICIENCY ANEMIA, UNSPECIFIED IRON DEFICIENCY ANEMIA TYPE: ICD-10-CM

## 2024-06-13 DIAGNOSIS — K91.2 INTESTINAL MALABSORPTION FOLLOWING GASTRECTOMY (HHS-HCC): ICD-10-CM

## 2024-06-13 PROCEDURE — 96365 THER/PROPH/DIAG IV INF INIT: CPT | Mod: INF

## 2024-06-13 PROCEDURE — 2500000004 HC RX 250 GENERAL PHARMACY W/ HCPCS (ALT 636 FOR OP/ED): Performed by: PHYSICIAN ASSISTANT

## 2024-06-13 RX ORDER — DIPHENHYDRAMINE HYDROCHLORIDE 50 MG/ML
50 INJECTION INTRAMUSCULAR; INTRAVENOUS AS NEEDED
Status: DISCONTINUED | OUTPATIENT
Start: 2024-06-13 | End: 2024-06-13 | Stop reason: HOSPADM

## 2024-06-13 RX ORDER — FAMOTIDINE 10 MG/ML
20 INJECTION INTRAVENOUS ONCE AS NEEDED
OUTPATIENT
Start: 2024-06-20

## 2024-06-13 RX ORDER — EPINEPHRINE 0.3 MG/.3ML
0.3 INJECTION SUBCUTANEOUS EVERY 5 MIN PRN
OUTPATIENT
Start: 2024-06-20

## 2024-06-13 RX ORDER — EPINEPHRINE 0.3 MG/.3ML
0.3 INJECTION SUBCUTANEOUS EVERY 5 MIN PRN
Status: DISCONTINUED | OUTPATIENT
Start: 2024-06-13 | End: 2024-06-13 | Stop reason: HOSPADM

## 2024-06-13 RX ORDER — DIPHENHYDRAMINE HYDROCHLORIDE 50 MG/ML
50 INJECTION INTRAMUSCULAR; INTRAVENOUS AS NEEDED
OUTPATIENT
Start: 2024-06-20

## 2024-06-13 RX ORDER — ALBUTEROL SULFATE 0.83 MG/ML
3 SOLUTION RESPIRATORY (INHALATION) AS NEEDED
OUTPATIENT
Start: 2024-06-20

## 2024-06-13 RX ORDER — FAMOTIDINE 10 MG/ML
20 INJECTION INTRAVENOUS ONCE AS NEEDED
Status: DISCONTINUED | OUTPATIENT
Start: 2024-06-13 | End: 2024-06-13 | Stop reason: HOSPADM

## 2024-06-13 RX ORDER — ALBUTEROL SULFATE 0.83 MG/ML
3 SOLUTION RESPIRATORY (INHALATION) AS NEEDED
Status: DISCONTINUED | OUTPATIENT
Start: 2024-06-13 | End: 2024-06-13 | Stop reason: HOSPADM

## 2024-06-13 ASSESSMENT — PAIN SCALES - GENERAL: PAINLEVEL: 0-NO PAIN

## 2024-06-13 NOTE — PROGRESS NOTES
Pt tolerated IV iron infusion well today, VSS throughout, Pt elected to leave after 15min of observation, pt educated on s/sx of adverse reaction, no further questions or concerns at this time

## 2024-06-19 ENCOUNTER — INFUSION (OUTPATIENT)
Dept: HEMATOLOGY/ONCOLOGY | Facility: CLINIC | Age: 49
End: 2024-06-19
Payer: COMMERCIAL

## 2024-06-19 VITALS
DIASTOLIC BLOOD PRESSURE: 82 MMHG | TEMPERATURE: 99.1 F | RESPIRATION RATE: 18 BRPM | OXYGEN SATURATION: 98 % | BODY MASS INDEX: 30.05 KG/M2 | SYSTOLIC BLOOD PRESSURE: 130 MMHG | HEART RATE: 70 BPM | WEIGHT: 173 LBS

## 2024-06-19 DIAGNOSIS — K91.2 INTESTINAL MALABSORPTION FOLLOWING GASTRECTOMY (HHS-HCC): ICD-10-CM

## 2024-06-19 DIAGNOSIS — K90.9 MALABSORPTION OF IRON (HHS-HCC): ICD-10-CM

## 2024-06-19 DIAGNOSIS — Z98.84 S/P BARIATRIC SURGERY: ICD-10-CM

## 2024-06-19 DIAGNOSIS — D50.9 IRON DEFICIENCY ANEMIA, UNSPECIFIED IRON DEFICIENCY ANEMIA TYPE: ICD-10-CM

## 2024-06-19 DIAGNOSIS — Z90.3 INTESTINAL MALABSORPTION FOLLOWING GASTRECTOMY (HHS-HCC): ICD-10-CM

## 2024-06-19 PROCEDURE — 2500000004 HC RX 250 GENERAL PHARMACY W/ HCPCS (ALT 636 FOR OP/ED): Performed by: PHYSICIAN ASSISTANT

## 2024-06-19 PROCEDURE — 96365 THER/PROPH/DIAG IV INF INIT: CPT | Mod: INF

## 2024-06-19 RX ORDER — FAMOTIDINE 10 MG/ML
20 INJECTION INTRAVENOUS ONCE AS NEEDED
Status: DISCONTINUED | OUTPATIENT
Start: 2024-06-19 | End: 2024-06-19 | Stop reason: HOSPADM

## 2024-06-19 RX ORDER — EPINEPHRINE 0.3 MG/.3ML
0.3 INJECTION SUBCUTANEOUS EVERY 5 MIN PRN
OUTPATIENT
Start: 2024-06-20

## 2024-06-19 RX ORDER — EPINEPHRINE 0.3 MG/.3ML
0.3 INJECTION SUBCUTANEOUS EVERY 5 MIN PRN
Status: DISCONTINUED | OUTPATIENT
Start: 2024-06-19 | End: 2024-06-19 | Stop reason: HOSPADM

## 2024-06-19 RX ORDER — ALBUTEROL SULFATE 0.83 MG/ML
3 SOLUTION RESPIRATORY (INHALATION) AS NEEDED
OUTPATIENT
Start: 2024-06-20

## 2024-06-19 RX ORDER — DIPHENHYDRAMINE HYDROCHLORIDE 50 MG/ML
50 INJECTION INTRAMUSCULAR; INTRAVENOUS AS NEEDED
OUTPATIENT
Start: 2024-06-20

## 2024-06-19 RX ORDER — FAMOTIDINE 10 MG/ML
20 INJECTION INTRAVENOUS ONCE AS NEEDED
OUTPATIENT
Start: 2024-06-20

## 2024-06-19 RX ORDER — DIPHENHYDRAMINE HYDROCHLORIDE 50 MG/ML
50 INJECTION INTRAMUSCULAR; INTRAVENOUS AS NEEDED
Status: DISCONTINUED | OUTPATIENT
Start: 2024-06-19 | End: 2024-06-19 | Stop reason: HOSPADM

## 2024-06-19 RX ORDER — ALBUTEROL SULFATE 0.83 MG/ML
3 SOLUTION RESPIRATORY (INHALATION) AS NEEDED
Status: DISCONTINUED | OUTPATIENT
Start: 2024-06-19 | End: 2024-06-19 | Stop reason: HOSPADM

## 2024-06-19 ASSESSMENT — PAIN SCALES - GENERAL: PAINLEVEL: 0-NO PAIN

## 2024-06-20 ENCOUNTER — TELEPHONE (OUTPATIENT)
Dept: PRIMARY CARE | Facility: CLINIC | Age: 49
End: 2024-06-20
Payer: COMMERCIAL

## 2024-06-20 DIAGNOSIS — F41.9 ANXIETY: ICD-10-CM

## 2024-06-20 RX ORDER — BUPROPION HYDROCHLORIDE 150 MG/1
150 TABLET ORAL EVERY MORNING
Qty: 90 TABLET | Refills: 1 | Status: SHIPPED | OUTPATIENT
Start: 2024-06-20

## 2024-06-20 NOTE — TELEPHONE ENCOUNTER
----- Message from Leyda Blair sent at 6/20/2024  7:40 AM EDT -----  Regarding: Buproprion  Contact: [phone]  I need this to be refilled but there are no refills available.

## 2024-06-26 ENCOUNTER — APPOINTMENT (OUTPATIENT)
Dept: HEMATOLOGY/ONCOLOGY | Facility: CLINIC | Age: 49
End: 2024-06-26
Payer: COMMERCIAL

## 2024-06-27 ENCOUNTER — INFUSION (OUTPATIENT)
Dept: HEMATOLOGY/ONCOLOGY | Facility: CLINIC | Age: 49
End: 2024-06-27
Payer: COMMERCIAL

## 2024-06-27 VITALS
HEART RATE: 68 BPM | TEMPERATURE: 98.4 F | WEIGHT: 172.73 LBS | BODY MASS INDEX: 30 KG/M2 | DIASTOLIC BLOOD PRESSURE: 77 MMHG | SYSTOLIC BLOOD PRESSURE: 115 MMHG | RESPIRATION RATE: 18 BRPM | OXYGEN SATURATION: 98 %

## 2024-06-27 DIAGNOSIS — K90.9 MALABSORPTION OF IRON (HHS-HCC): ICD-10-CM

## 2024-06-27 DIAGNOSIS — D50.9 IRON DEFICIENCY ANEMIA, UNSPECIFIED IRON DEFICIENCY ANEMIA TYPE: ICD-10-CM

## 2024-06-27 DIAGNOSIS — K91.2 INTESTINAL MALABSORPTION FOLLOWING GASTRECTOMY (HHS-HCC): ICD-10-CM

## 2024-06-27 DIAGNOSIS — Z98.84 S/P BARIATRIC SURGERY: ICD-10-CM

## 2024-06-27 DIAGNOSIS — Z90.3 INTESTINAL MALABSORPTION FOLLOWING GASTRECTOMY (HHS-HCC): ICD-10-CM

## 2024-06-27 PROCEDURE — 2500000004 HC RX 250 GENERAL PHARMACY W/ HCPCS (ALT 636 FOR OP/ED): Performed by: PHYSICIAN ASSISTANT

## 2024-06-27 PROCEDURE — 96365 THER/PROPH/DIAG IV INF INIT: CPT | Mod: INF

## 2024-06-27 RX ORDER — DIPHENHYDRAMINE HYDROCHLORIDE 50 MG/ML
50 INJECTION INTRAMUSCULAR; INTRAVENOUS AS NEEDED
OUTPATIENT
Start: 2024-06-27

## 2024-06-27 RX ORDER — ALBUTEROL SULFATE 0.83 MG/ML
3 SOLUTION RESPIRATORY (INHALATION) AS NEEDED
Status: DISCONTINUED | OUTPATIENT
Start: 2024-06-27 | End: 2024-06-27 | Stop reason: HOSPADM

## 2024-06-27 RX ORDER — FAMOTIDINE 10 MG/ML
20 INJECTION INTRAVENOUS ONCE AS NEEDED
OUTPATIENT
Start: 2024-06-27

## 2024-06-27 RX ORDER — FAMOTIDINE 10 MG/ML
20 INJECTION INTRAVENOUS ONCE AS NEEDED
Status: DISCONTINUED | OUTPATIENT
Start: 2024-06-27 | End: 2024-06-27 | Stop reason: HOSPADM

## 2024-06-27 RX ORDER — ALBUTEROL SULFATE 0.83 MG/ML
3 SOLUTION RESPIRATORY (INHALATION) AS NEEDED
OUTPATIENT
Start: 2024-06-27

## 2024-06-27 RX ORDER — EPINEPHRINE 0.3 MG/.3ML
0.3 INJECTION SUBCUTANEOUS EVERY 5 MIN PRN
OUTPATIENT
Start: 2024-06-27

## 2024-06-27 RX ORDER — EPINEPHRINE 0.3 MG/.3ML
0.3 INJECTION SUBCUTANEOUS EVERY 5 MIN PRN
Status: DISCONTINUED | OUTPATIENT
Start: 2024-06-27 | End: 2024-06-27 | Stop reason: HOSPADM

## 2024-06-27 RX ORDER — DIPHENHYDRAMINE HYDROCHLORIDE 50 MG/ML
50 INJECTION INTRAMUSCULAR; INTRAVENOUS AS NEEDED
Status: DISCONTINUED | OUTPATIENT
Start: 2024-06-27 | End: 2024-06-27 | Stop reason: HOSPADM

## 2024-06-27 ASSESSMENT — PAIN SCALES - GENERAL: PAINLEVEL: 0-NO PAIN

## 2024-06-27 NOTE — SIGNIFICANT EVENT
Pt tolerated infusion without issues.  Declined to stay for 30 minute observation period.  Vital signs stable.

## 2024-07-22 DIAGNOSIS — D50.9 IRON DEFICIENCY ANEMIA, UNSPECIFIED IRON DEFICIENCY ANEMIA TYPE: Primary | ICD-10-CM

## 2024-08-09 ENCOUNTER — LAB (OUTPATIENT)
Dept: LAB | Facility: LAB | Age: 49
End: 2024-08-09
Payer: COMMERCIAL

## 2024-08-09 DIAGNOSIS — D50.9 IRON DEFICIENCY ANEMIA, UNSPECIFIED IRON DEFICIENCY ANEMIA TYPE: ICD-10-CM

## 2024-08-09 LAB
ALBUMIN SERPL BCP-MCNC: 4.1 G/DL (ref 3.4–5)
ALP SERPL-CCNC: 59 U/L (ref 33–110)
ALT SERPL W P-5'-P-CCNC: 34 U/L (ref 7–45)
ANION GAP SERPL CALC-SCNC: 10 MMOL/L (ref 10–20)
AST SERPL W P-5'-P-CCNC: 22 U/L (ref 9–39)
BASOPHILS # BLD AUTO: 0.05 X10*3/UL (ref 0–0.1)
BASOPHILS NFR BLD AUTO: 1.1 %
BILIRUB SERPL-MCNC: 0.6 MG/DL (ref 0–1.2)
BUN SERPL-MCNC: 12 MG/DL (ref 6–23)
CALCIUM SERPL-MCNC: 9.2 MG/DL (ref 8.6–10.3)
CHLORIDE SERPL-SCNC: 106 MMOL/L (ref 98–107)
CO2 SERPL-SCNC: 29 MMOL/L (ref 21–32)
CREAT SERPL-MCNC: 0.77 MG/DL (ref 0.5–1.05)
EGFRCR SERPLBLD CKD-EPI 2021: >90 ML/MIN/1.73M*2
EOSINOPHIL # BLD AUTO: 0.15 X10*3/UL (ref 0–0.7)
EOSINOPHIL NFR BLD AUTO: 3.3 %
ERYTHROCYTE [DISTWIDTH] IN BLOOD BY AUTOMATED COUNT: 12.3 % (ref 11.5–14.5)
FERRITIN SERPL-MCNC: 108 NG/ML (ref 8–150)
FOLATE SERPL-MCNC: 20.2 NG/ML
GLUCOSE SERPL-MCNC: 137 MG/DL (ref 74–99)
HCT VFR BLD AUTO: 45.1 % (ref 36–46)
HGB BLD-MCNC: 14.8 G/DL (ref 12–16)
IMM GRANULOCYTES # BLD AUTO: 0 X10*3/UL (ref 0–0.7)
IMM GRANULOCYTES NFR BLD AUTO: 0 % (ref 0–0.9)
IRON SATN MFR SERPL: 48 % (ref 25–45)
IRON SERPL-MCNC: 132 UG/DL (ref 35–150)
LYMPHOCYTES # BLD AUTO: 1.84 X10*3/UL (ref 1.2–4.8)
LYMPHOCYTES NFR BLD AUTO: 40.6 %
MCH RBC QN AUTO: 31.9 PG (ref 26–34)
MCHC RBC AUTO-ENTMCNC: 32.8 G/DL (ref 32–36)
MCV RBC AUTO: 97 FL (ref 80–100)
MONOCYTES # BLD AUTO: 0.34 X10*3/UL (ref 0.1–1)
MONOCYTES NFR BLD AUTO: 7.5 %
NEUTROPHILS # BLD AUTO: 2.15 X10*3/UL (ref 1.2–7.7)
NEUTROPHILS NFR BLD AUTO: 47.5 %
NRBC BLD-RTO: 0 /100 WBCS (ref 0–0)
PLATELET # BLD AUTO: 260 X10*3/UL (ref 150–450)
POTASSIUM SERPL-SCNC: 4.3 MMOL/L (ref 3.5–5.3)
PROT SERPL-MCNC: 6.3 G/DL (ref 6.4–8.2)
RBC # BLD AUTO: 4.64 X10*6/UL (ref 4–5.2)
SODIUM SERPL-SCNC: 141 MMOL/L (ref 136–145)
TIBC SERPL-MCNC: 276 UG/DL (ref 240–445)
UIBC SERPL-MCNC: 144 UG/DL (ref 110–370)
VIT B12 SERPL-MCNC: 749 PG/ML (ref 211–911)
WBC # BLD AUTO: 4.5 X10*3/UL (ref 4.4–11.3)

## 2024-08-09 PROCEDURE — 82728 ASSAY OF FERRITIN: CPT

## 2024-08-09 PROCEDURE — 36415 COLL VENOUS BLD VENIPUNCTURE: CPT

## 2024-08-09 PROCEDURE — 82607 VITAMIN B-12: CPT

## 2024-08-09 PROCEDURE — 85025 COMPLETE CBC W/AUTO DIFF WBC: CPT

## 2024-08-09 PROCEDURE — 83550 IRON BINDING TEST: CPT

## 2024-08-09 PROCEDURE — 82746 ASSAY OF FOLIC ACID SERUM: CPT

## 2024-08-09 PROCEDURE — 80053 COMPREHEN METABOLIC PANEL: CPT

## 2024-08-09 PROCEDURE — 83540 ASSAY OF IRON: CPT

## 2024-08-28 ENCOUNTER — HOSPITAL ENCOUNTER (OUTPATIENT)
Dept: RADIOLOGY | Facility: HOSPITAL | Age: 49
Discharge: HOME | End: 2024-08-28
Payer: COMMERCIAL

## 2024-08-28 ENCOUNTER — LAB (OUTPATIENT)
Dept: LAB | Facility: LAB | Age: 49
End: 2024-08-28
Payer: COMMERCIAL

## 2024-08-28 ENCOUNTER — APPOINTMENT (OUTPATIENT)
Dept: PRIMARY CARE | Facility: CLINIC | Age: 49
End: 2024-08-28
Payer: COMMERCIAL

## 2024-08-28 VITALS
SYSTOLIC BLOOD PRESSURE: 120 MMHG | BODY MASS INDEX: 29.53 KG/M2 | HEIGHT: 64 IN | TEMPERATURE: 98.2 F | OXYGEN SATURATION: 94 % | HEART RATE: 87 BPM | DIASTOLIC BLOOD PRESSURE: 84 MMHG | WEIGHT: 173 LBS

## 2024-08-28 DIAGNOSIS — K91.2 INTESTINAL MALABSORPTION FOLLOWING GASTRECTOMY (HHS-HCC): ICD-10-CM

## 2024-08-28 DIAGNOSIS — Z00.00 ROUTINE GENERAL MEDICAL EXAMINATION AT A HEALTH CARE FACILITY: Primary | ICD-10-CM

## 2024-08-28 DIAGNOSIS — K92.1 BLOOD IN STOOL: ICD-10-CM

## 2024-08-28 DIAGNOSIS — K90.9 MALABSORPTION OF IRON (HHS-HCC): ICD-10-CM

## 2024-08-28 DIAGNOSIS — R30.0 DYSURIA: ICD-10-CM

## 2024-08-28 DIAGNOSIS — Z00.00 ROUTINE GENERAL MEDICAL EXAMINATION AT A HEALTH CARE FACILITY: ICD-10-CM

## 2024-08-28 DIAGNOSIS — K59.09 OTHER CONSTIPATION: ICD-10-CM

## 2024-08-28 DIAGNOSIS — Z98.84 S/P BARIATRIC SURGERY: ICD-10-CM

## 2024-08-28 DIAGNOSIS — Z12.31 ENCOUNTER FOR SCREENING MAMMOGRAM FOR MALIGNANT NEOPLASM OF BREAST: ICD-10-CM

## 2024-08-28 DIAGNOSIS — Z90.3 INTESTINAL MALABSORPTION FOLLOWING GASTRECTOMY (HHS-HCC): ICD-10-CM

## 2024-08-28 DIAGNOSIS — F41.9 ANXIETY: ICD-10-CM

## 2024-08-28 DIAGNOSIS — D50.9 IRON DEFICIENCY ANEMIA, UNSPECIFIED IRON DEFICIENCY ANEMIA TYPE: ICD-10-CM

## 2024-08-28 DIAGNOSIS — Z90.710 H/O: HYSTERECTOMY: ICD-10-CM

## 2024-08-28 PROBLEM — R31.9 HEMATURIA: Status: RESOLVED | Noted: 2023-08-24 | Resolved: 2024-08-28

## 2024-08-28 PROBLEM — O03.9 SPONTANEOUS ABORTION (HHS-HCC): Status: RESOLVED | Noted: 2024-02-12 | Resolved: 2024-08-28

## 2024-08-28 LAB
ALBUMIN SERPL BCP-MCNC: 4.4 G/DL (ref 3.4–5)
ALP SERPL-CCNC: 67 U/L (ref 33–110)
ALT SERPL W P-5'-P-CCNC: 32 U/L (ref 7–45)
AMYLASE SERPL-CCNC: 27 U/L (ref 29–103)
ANION GAP SERPL CALC-SCNC: 12 MMOL/L (ref 10–20)
AST SERPL W P-5'-P-CCNC: 23 U/L (ref 9–39)
BASOPHILS # BLD AUTO: 0.05 X10*3/UL (ref 0–0.1)
BASOPHILS NFR BLD AUTO: 0.9 %
BILIRUB SERPL-MCNC: 0.6 MG/DL (ref 0–1.2)
BUN SERPL-MCNC: 10 MG/DL (ref 6–23)
CALCIUM SERPL-MCNC: 9.3 MG/DL (ref 8.6–10.3)
CHLORIDE SERPL-SCNC: 103 MMOL/L (ref 98–107)
CO2 SERPL-SCNC: 30 MMOL/L (ref 21–32)
CREAT SERPL-MCNC: 0.71 MG/DL (ref 0.5–1.05)
EGFRCR SERPLBLD CKD-EPI 2021: >90 ML/MIN/1.73M*2
EOSINOPHIL # BLD AUTO: 0.16 X10*3/UL (ref 0–0.7)
EOSINOPHIL NFR BLD AUTO: 2.7 %
ERYTHROCYTE [DISTWIDTH] IN BLOOD BY AUTOMATED COUNT: 12 % (ref 11.5–14.5)
FERRITIN SERPL-MCNC: 128 NG/ML (ref 8–150)
GLUCOSE SERPL-MCNC: 83 MG/DL (ref 74–99)
HCT VFR BLD AUTO: 43.3 % (ref 36–46)
HGB BLD-MCNC: 14 G/DL (ref 12–16)
IMM GRANULOCYTES # BLD AUTO: 0.01 X10*3/UL (ref 0–0.7)
IMM GRANULOCYTES NFR BLD AUTO: 0.2 % (ref 0–0.9)
IRON SATN MFR SERPL: 43 % (ref 25–45)
IRON SERPL-MCNC: 121 UG/DL (ref 35–150)
LYMPHOCYTES # BLD AUTO: 2.45 X10*3/UL (ref 1.2–4.8)
LYMPHOCYTES NFR BLD AUTO: 41.8 %
MCH RBC QN AUTO: 30.9 PG (ref 26–34)
MCHC RBC AUTO-ENTMCNC: 32.3 G/DL (ref 32–36)
MCV RBC AUTO: 96 FL (ref 80–100)
MONOCYTES # BLD AUTO: 0.52 X10*3/UL (ref 0.1–1)
MONOCYTES NFR BLD AUTO: 8.9 %
NEUTROPHILS # BLD AUTO: 2.67 X10*3/UL (ref 1.2–7.7)
NEUTROPHILS NFR BLD AUTO: 45.5 %
NRBC BLD-RTO: 0 /100 WBCS (ref 0–0)
PLATELET # BLD AUTO: 271 X10*3/UL (ref 150–450)
POC APPEARANCE, URINE: CLEAR
POC BILIRUBIN, URINE: NEGATIVE
POC BLOOD, URINE: NEGATIVE
POC COLOR, URINE: YELLOW
POC GLUCOSE, URINE: NEGATIVE MG/DL
POC KETONES, URINE: NEGATIVE MG/DL
POC LEUKOCYTES, URINE: ABNORMAL
POC NITRITE,URINE: NEGATIVE
POC PH, URINE: 6 PH
POC PROTEIN, URINE: NEGATIVE MG/DL
POC SPECIFIC GRAVITY, URINE: 1.01
POC UROBILINOGEN, URINE: 0.2 EU/DL
POTASSIUM SERPL-SCNC: 4.3 MMOL/L (ref 3.5–5.3)
PROT SERPL-MCNC: 6.7 G/DL (ref 6.4–8.2)
RBC # BLD AUTO: 4.53 X10*6/UL (ref 4–5.2)
SODIUM SERPL-SCNC: 141 MMOL/L (ref 136–145)
TIBC SERPL-MCNC: 281 UG/DL (ref 240–445)
TSH SERPL-ACNC: 1.51 MIU/L (ref 0.44–3.98)
UIBC SERPL-MCNC: 160 UG/DL (ref 110–370)
WBC # BLD AUTO: 5.9 X10*3/UL (ref 4.4–11.3)

## 2024-08-28 PROCEDURE — 81003 URINALYSIS AUTO W/O SCOPE: CPT | Performed by: FAMILY MEDICINE

## 2024-08-28 PROCEDURE — 83540 ASSAY OF IRON: CPT

## 2024-08-28 PROCEDURE — 87086 URINE CULTURE/COLONY COUNT: CPT

## 2024-08-28 PROCEDURE — 99213 OFFICE O/P EST LOW 20 MIN: CPT | Performed by: FAMILY MEDICINE

## 2024-08-28 PROCEDURE — 3008F BODY MASS INDEX DOCD: CPT | Performed by: FAMILY MEDICINE

## 2024-08-28 PROCEDURE — 82150 ASSAY OF AMYLASE: CPT

## 2024-08-28 PROCEDURE — 99396 PREV VISIT EST AGE 40-64: CPT | Performed by: FAMILY MEDICINE

## 2024-08-28 PROCEDURE — 1036F TOBACCO NON-USER: CPT | Performed by: FAMILY MEDICINE

## 2024-08-28 PROCEDURE — 83550 IRON BINDING TEST: CPT

## 2024-08-28 PROCEDURE — 82728 ASSAY OF FERRITIN: CPT

## 2024-08-28 PROCEDURE — 36415 COLL VENOUS BLD VENIPUNCTURE: CPT

## 2024-08-28 PROCEDURE — 85025 COMPLETE CBC W/AUTO DIFF WBC: CPT

## 2024-08-28 PROCEDURE — 84443 ASSAY THYROID STIM HORMONE: CPT

## 2024-08-28 PROCEDURE — 80053 COMPREHEN METABOLIC PANEL: CPT

## 2024-08-28 PROCEDURE — 74022 RADEX COMPL AQT ABD SERIES: CPT

## 2024-08-28 RX ORDER — VENLAFAXINE HYDROCHLORIDE 150 MG/1
150 CAPSULE, EXTENDED RELEASE ORAL DAILY
Qty: 90 CAPSULE | Refills: 3 | Status: SHIPPED | OUTPATIENT
Start: 2024-08-28

## 2024-08-28 ASSESSMENT — PROMIS GLOBAL HEALTH SCALE
RATE_MENTAL_HEALTH: GOOD
EMOTIONAL_PROBLEMS: OFTEN
RATE_AVERAGE_PAIN: 2
RATE_AVERAGE_FATIGUE: MODERATE
RATE_GENERAL_HEALTH: VERY GOOD
RATE_PHYSICAL_HEALTH: GOOD
RATE_QUALITY_OF_LIFE: VERY GOOD
CARRYOUT_PHYSICAL_ACTIVITIES: COMPLETELY
RATE_SOCIAL_SATISFACTION: VERY GOOD
CARRYOUT_SOCIAL_ACTIVITIES: VERY GOOD

## 2024-08-28 ASSESSMENT — PATIENT HEALTH QUESTIONNAIRE - PHQ9
1. LITTLE INTEREST OR PLEASURE IN DOING THINGS: NOT AT ALL
SUM OF ALL RESPONSES TO PHQ9 QUESTIONS 1 AND 2: 0
2. FEELING DOWN, DEPRESSED OR HOPELESS: NOT AT ALL

## 2024-08-28 ASSESSMENT — ENCOUNTER SYMPTOMS
CONSTIPATION: 1
FEVER: 0

## 2024-08-28 NOTE — PATIENT INSTRUCTIONS
Low protein is likely related to malabsorption from the previous bypass surgery    increased protein intake    Anxiety: Continue current medications    Constipation  For constipation prevention/treatment:  Drink 8-10 glasses of water per day.  Take a daily fiber supplement Metamucil, 1 heaping tablespoon in 4-8ounces apple or prune juice daily.  Alternative is Metamucil wafer, eating 2 daily washing down with 4-8ounces apple or prune juice.  You can take an OTC stool softener like Colace 1-2 times per day.  In addition to above, if still have issues- use an OTC laxative called Miralax. This works very well and used to be available only by prescription.  Use one heaping tablespoon mixed in 4-8 ounces water IN ADDITION to the metamucil - just take it opposite time of day as metamucil.        Try to reduce omeprazole usage and try pepcid instead     Get abdominal xray to see if constipation   Miralax daily for several days   If no improvement repeat Ct abdomen     Will send urine for culture       Suggest seeing Dr Mckeon for constipation ,  blood in stool       GERD  Take prescribed medication as written.  May take TUMS or Rolaids as needed.  No eating within 2 hours of going to bed.  May want to elevate the head of your bed.  Avoid caffeine, chocolate, alcohol, spicy foods, acidic foods, fried foods, mint flavoring.  Call or return to the office if your symptoms change,  worsen, or fail to improve.   There are 2 different types of acid reduction medications: The proton pump inhibitor such as Prilosec, Prevacid, pantoprazole, worked very well but they can take 7 to 8 hours to kick in.  We use these for things like ulcers and gastritis as well as chronic reflux, heartburn      The other class is H2 blockers this includes Pepcid, Pepcid Complete: This is a good option to take symptomatically because the onset of action is within 45 minutes to an hour.

## 2024-08-28 NOTE — PROGRESS NOTES
Subjective   Patient ID: Leyda Blair is a 48 y.o. female who presents for Annual Exam. C/O lower back and pelvic pain for one week in addition to urinary urgency as well. Pt also states her protein levels were low the past two times on her labs.     Lower back pain, pelvic discomfort over the next week urinary urgency  Over the last week   Dull achiness     Similar to last fall   Back pain     Constipated   Not doing any meds     CT of the abdomen and pelvis in August was normal except for constipation  Due for mammogram    Hysterectomy in the past for dysfunctional uterine bleeding    2013 Crissy-en-Y bypass  Was doing keto   Stopped keto since doing iron infusions  every 6 months   Not doing calcium carbonate     GERD on omeprazole          ROS :  ( No or Yes )  Any eye problems:    y  Frequent nasal congestion or sneezing:  N  Difficulty hearing:  N  Ear problems:   y  Asthma or wheezing:   N  Frequent cough:   N  Shortness of breath:N  Hemoptysis: N  Hx of TB: N  High blood pressure: N  Heart disease: N  Heart murmur:N  Chest pain or pressure with exertion:N  Leg pains with walking up hill: N  Fast heartbeat or palpitations:N  Varicose veins: N  Difficulty swallowing foods or liquids: N  Abdominal pains: y  Frequent indigestion or heartburn: y  Constipation: y  Diarrhea or loose stools: N  Weight changes recently: N  Change in bowel movements: N  An ulcer: N  Black stools: N  Jaundice, hepatitis or liver problems: N  Gallstones or gallbladder problems: N  Stomach or intestinal problems: N  Vomited blood : N  Blood in bowel movements: N  Sickle cell trait  or Anemia: y  Been refused as a blood donor: y  Problems with her kidney, bladder, or prostate: N  Loss of control of your urine: N  Pain or burning with urination: y  Blood in her urine: N  Trouble starting flow of urine: N  Frequent urination at night: N  History of venereal disease: N  Any skin problems: N  Diabetes: N  Thyroid disease: N  Frequent back  "pain: N  Pain or swelling around joints: N  Broken any bones: y  Frequent headaches: y  Dizziness: y  Have you ever had Seizures or convulsions: N  Have you ever temporarily lost control of your hand or foot : N   Had a stroke or been paralyzed : N  Temporarily lost your ability to speak: N  Fainted or lost consciousness: N  Hallucinations: N  Nervousness: N  Do you take medications for your nerves: y  Trouble falling asleep or staying asleep: N  Do you feel tired even after a good night sleep: N  Do you feel down in the dumps or depressed: N  Frequent crying: N  Using alcohol excessively: N  Any street drug use : N  Do have any other medical problems that are concerns :      Review of Systems   Constitutional:  Negative for fever.   Gastrointestinal:  Positive for constipation.       Objective   /84   Pulse 87   Temp 36.8 °C (98.2 °F)   Ht 1.613 m (5' 3.5\")   Wt 78.5 kg (173 lb)   SpO2 94%   BMI 30.16 kg/m²     Physical Exam  Constitutional:       General: She is not in acute distress.     Appearance: Normal appearance.   HENT:      Head: Normocephalic and atraumatic.      Right Ear: Tympanic membrane, ear canal and external ear normal.      Left Ear: Tympanic membrane, ear canal and external ear normal.      Nose: Nose normal.      Mouth/Throat:      Mouth: Mucous membranes are moist.      Pharynx: No oropharyngeal exudate or posterior oropharyngeal erythema.   Eyes:      Extraocular Movements: Extraocular movements intact.      Conjunctiva/sclera: Conjunctivae normal.      Pupils: Pupils are equal, round, and reactive to light.   Cardiovascular:      Rate and Rhythm: Normal rate and regular rhythm.      Heart sounds: No murmur heard.  Pulmonary:      Effort: Pulmonary effort is normal.      Breath sounds: Normal breath sounds.   Abdominal:      General: Bowel sounds are normal.      Palpations: Abdomen is soft.      Comments: Mild diffuse abdominal tenderness more in the suprapubic and epigastric " area  No guarding    No CVAT no rashes   Musculoskeletal:         General: Normal range of motion.      Cervical back: No rigidity.   Lymphadenopathy:      Cervical: No cervical adenopathy.   Skin:     General: Skin is warm and dry.      Findings: No rash.   Neurological:      General: No focal deficit present.      Mental Status: She is alert and oriented to person, place, and time.      Cranial Nerves: No cranial nerve deficit.      Gait: Gait normal.   Psychiatric:         Mood and Affect: Mood normal.         Behavior: Behavior normal.         Assessment/Plan   Problem List Items Addressed This Visit             ICD-10-CM    Malabsorption of iron ([facility]) K90.9    Relevant Orders    Thyroid Stimulating Hormone    CBC and Auto Differential    Comprehensive Metabolic Panel    Amylase    Intestinal malabsorption following gastrectomy ([facility]) K91.2, Z90.3    Relevant Orders    Thyroid Stimulating Hormone    CBC and Auto Differential    Comprehensive Metabolic Panel    Amylase    H/O: hysterectomy Z90.710    Relevant Orders    Thyroid Stimulating Hormone    CBC and Auto Differential    Comprehensive Metabolic Panel    Amylase    Constipation K59.00    Relevant Orders    XR abdomen 2 views w chest 1 view    Thyroid Stimulating Hormone    CBC and Auto Differential    Comprehensive Metabolic Panel    Amylase    Anxiety F41.9    Relevant Medications    venlafaxine XR (Effexor-XR) 150 mg 24 hr capsule    Other Relevant Orders    Thyroid Stimulating Hormone    CBC and Auto Differential    Comprehensive Metabolic Panel    Amylase     Other Visit Diagnoses         Codes    Routine general medical examination at a health care facility    -  Primary Z00.00    Relevant Orders    Thyroid Stimulating Hormone    CBC and Auto Differential    Comprehensive Metabolic Panel    Amylase    Dysuria     R30.0    Relevant Orders    POCT UA Automated manually resulted (Completed)    Urine Culture    Thyroid Stimulating Hormone    CBC and  Auto Differential    Comprehensive Metabolic Panel    Amylase    Blood in stool     K92.1    Relevant Orders    XR abdomen 2 views w chest 1 view    Thyroid Stimulating Hormone    CBC and Auto Differential    Comprehensive Metabolic Panel    Amylase    Encounter for screening mammogram for malignant neoplasm of breast     Z12.31    Relevant Orders    BI mammo bilateral screening tomosynthesis

## 2024-08-30 LAB — BACTERIA UR CULT: NORMAL

## 2024-09-09 ENCOUNTER — OFFICE VISIT (OUTPATIENT)
Dept: PRIMARY CARE | Facility: CLINIC | Age: 49
End: 2024-09-09
Payer: COMMERCIAL

## 2024-09-09 VITALS
TEMPERATURE: 98.2 F | HEART RATE: 95 BPM | BODY MASS INDEX: 29.53 KG/M2 | HEIGHT: 64 IN | WEIGHT: 173 LBS | OXYGEN SATURATION: 95 % | DIASTOLIC BLOOD PRESSURE: 86 MMHG | SYSTOLIC BLOOD PRESSURE: 121 MMHG

## 2024-09-09 DIAGNOSIS — N30.01 ACUTE CYSTITIS WITH HEMATURIA: Primary | ICD-10-CM

## 2024-09-09 LAB
POC APPEARANCE, URINE: ABNORMAL
POC BILIRUBIN, URINE: NEGATIVE
POC BLOOD, URINE: ABNORMAL
POC COLOR, URINE: YELLOW
POC GLUCOSE, URINE: NEGATIVE MG/DL
POC KETONES, URINE: NEGATIVE MG/DL
POC LEUKOCYTES, URINE: ABNORMAL
POC NITRITE,URINE: NEGATIVE
POC PH, URINE: 7.5 PH
POC PROTEIN, URINE: NEGATIVE MG/DL
POC SPECIFIC GRAVITY, URINE: 1.01
POC UROBILINOGEN, URINE: 0.2 EU/DL

## 2024-09-09 PROCEDURE — 1036F TOBACCO NON-USER: CPT | Performed by: FAMILY MEDICINE

## 2024-09-09 PROCEDURE — 87086 URINE CULTURE/COLONY COUNT: CPT

## 2024-09-09 PROCEDURE — 81003 URINALYSIS AUTO W/O SCOPE: CPT | Performed by: FAMILY MEDICINE

## 2024-09-09 PROCEDURE — 3008F BODY MASS INDEX DOCD: CPT | Performed by: FAMILY MEDICINE

## 2024-09-09 PROCEDURE — 99213 OFFICE O/P EST LOW 20 MIN: CPT | Performed by: FAMILY MEDICINE

## 2024-09-09 RX ORDER — SULFAMETHOXAZOLE AND TRIMETHOPRIM 800; 160 MG/1; MG/1
1 TABLET ORAL 2 TIMES DAILY
Qty: 14 TABLET | Refills: 0 | Status: SHIPPED | OUTPATIENT
Start: 2024-09-09 | End: 2024-09-16

## 2024-09-09 ASSESSMENT — PATIENT HEALTH QUESTIONNAIRE - PHQ9
2. FEELING DOWN, DEPRESSED OR HOPELESS: NOT AT ALL
SUM OF ALL RESPONSES TO PHQ9 QUESTIONS 1 AND 2: 0
1. LITTLE INTEREST OR PLEASURE IN DOING THINGS: NOT AT ALL

## 2024-09-09 ASSESSMENT — ENCOUNTER SYMPTOMS
FREQUENCY: 1
DYSURIA: 1
NAUSEA: 1
HEMATURIA: 1
FEVER: 0
SWEATS: 1
FLANK PAIN: 1

## 2024-09-09 NOTE — PROGRESS NOTES
"Subjective   Patient ID: Leyda Blair is a 48 y.o. female who presents for UTI. Sx's onset this morning; frequency, burning, bilateral flank pain, nausea, HA and hematuria.      Sxs started today     Back sore   Previous sxs resolved then recurred today       Difficulty Urinating   This is a new problem. The current episode started today. The problem occurs every urination. The problem has been waxing and waning. The quality of the pain is described as burning. The pain is at a severity of 4/10. The pain is moderate. There has been no fever. She is Sexually active. There is No history of pyelonephritis. Associated symptoms include flank pain, frequency, hematuria, nausea, sweats and urgency.        Review of Systems   Constitutional:  Negative for fever.   Gastrointestinal:  Positive for nausea.   Genitourinary:  Positive for dysuria, flank pain, frequency, hematuria and urgency.       Objective   /86   Pulse 95   Temp 36.8 °C (98.2 °F)   Ht 1.613 m (5' 3.5\")   Wt 78.5 kg (173 lb)   SpO2 95%   BMI 30.16 kg/m²     Physical Exam    Patient is alert and oriented, nontoxic  Heart regular, rate and rhythm  Back: mild CVAT  Abdomen: Mild suprapubic tenderness      Assessment/Plan   Problem List Items Addressed This Visit    None  Visit Diagnoses         Codes    Acute cystitis with hematuria    -  Primary N30.01    Relevant Medications    sulfamethoxazole-trimethoprim (Bactrim DS) 800-160 mg tablet    Other Relevant Orders    POCT UA Automated manually resulted (Completed)    Urine Culture               "

## 2024-09-09 NOTE — PATIENT INSTRUCTIONS
Recommend frequent fluid intake, you can take over-the-counter medications for symptom relief.  We will send urine for culture to confirm the infection and notify when results come back usually within 2-3 days.  Call if your symptoms worsen, especially fevers, worsening back pain, nausea, vomiting or diarrhea.

## 2024-09-10 LAB — BACTERIA UR CULT: NORMAL

## 2024-09-11 ENCOUNTER — APPOINTMENT (OUTPATIENT)
Dept: RADIOLOGY | Facility: HOSPITAL | Age: 49
End: 2024-09-11
Payer: COMMERCIAL

## 2024-09-12 ENCOUNTER — TELEPHONE (OUTPATIENT)
Dept: SURGERY | Facility: CLINIC | Age: 49
End: 2024-09-12
Payer: COMMERCIAL

## 2024-09-12 NOTE — TELEPHONE ENCOUNTER
I attempted to call this patient, she has a appointment with bariatric surgery in Dec. I had called to see if she was interested in moving her appointment up any earlier since we have opening. I left a voicemail including a number she could reach me at.

## 2024-09-13 DIAGNOSIS — K59.00 CONSTIPATION, UNSPECIFIED CONSTIPATION TYPE: Primary | ICD-10-CM

## 2024-09-13 RX ORDER — SYRING-NEEDL,DISP,INSUL,0.3 ML 29 G X1/2"
296 SYRINGE, EMPTY DISPOSABLE MISCELLANEOUS ONCE
Qty: 296 ML | Refills: 0 | Status: SHIPPED | OUTPATIENT
Start: 2024-09-13 | End: 2024-09-13

## 2024-09-13 NOTE — PROGRESS NOTES
Patient messaged office directly and is complaining of persistent issues with constipation and back pain.  One-time magnesium citrate sent to pharmacy.  Patient to notify provider for any persistent or worsening constipation/back pain issues.

## 2024-09-23 DIAGNOSIS — K59.00 CONSTIPATION, UNSPECIFIED CONSTIPATION TYPE: ICD-10-CM

## 2024-09-23 DIAGNOSIS — K21.9 GASTROESOPHAGEAL REFLUX DISEASE WITHOUT ESOPHAGITIS: Primary | ICD-10-CM

## 2024-10-08 NOTE — PROGRESS NOTES
"BARIATRIC SURGERY CLINIC  1 YEAR/ANNUAL FOLLOW UP NOTE    CLINIC DATE:  10/14/24    NAME:  Leyda Blair, 49 y.o.  MRN: [id number]    DATE OF SURGERY:  4/2012  SURGICAL PROCEDURE:  Laparoscopic sam en y gastric bypass 60903    EXTRA PROCEDURES:  None      INITIAL WEIGHT:  255  LBS  PRE-SURGICAL WEIGHT:  Unknown  LBS  THIS VISIT WEIGHT / BMI: 167 LBS/ BMI: 29.12  Wt Readings from Last 1 Encounters:   10/14/24 75.8 kg (167 lb)      BMI Readings from Last 1 Encounters:   10/14/24 29.12 kg/m²     WEIGHT / BMI HX:    Wt Readings from Last 3 Encounters:   10/14/24 75.8 kg (167 lb)   09/09/24 78.5 kg (173 lb)   08/28/24 78.5 kg (173 lb)      BMI Readings from Last 3 Encounters:   10/14/24 29.12 kg/m²   09/09/24 30.16 kg/m²   08/28/24 30.16 kg/m²        TOTAL WEIGHT LOSS:  88  LBS    SURGEON:  Dr. Chiquis Mckeon    PRESENTING TODAY FOR BARIATRIC POST-OP VISIT:  12 years  - This LPN spoke with Leyda on 10/9/24, Self verbalizes concerns:  \"Constipation, Increased burning in my stomach even on Omeprazole, absorption concerns reflected in labs results, cramping & lower back pain, my labs are coming back abnormal Amylase, Iron, Protein\".  Last FUV 1/9/2017.    Started with anemioa for a year.  Have to have infusions.  Tried liquid iron, tables and changing diet and labs did not improve.  Had 2 infusions in last year.  Then noted protein low, amylase low and started with severe constipation and then blood in her stool and even saw blood clots in stool.  Had a UTI too.  Then saw her doctor.  Started on miralas and dulcolax without relief.  Metamucil also and did not see much help with it.  Did a colon cleanse and then this helped some.  Doc said continue with all of these.  Then did an enema and was miserable.  Helped some.  Then tried suppositories without relif. Did another cleanse 2 weeks ago and a little better since then.  She stopped the miralax, duloclax and metamucil.  Now has constant ache in lower back.     Getting " "over 100 oz water daily.  Had lack of appetite for 3 weeks.  Trying foods that help with constipation like greens.  Can't eat fried. No bleached flours.  Soda rare  Diet recall\" bagel in am. Chicken per on multigrain sandwich thin, and then had nothing else.  Had some diarrhea yesterday and this was new..  this am had a black bm.  Took pepto yesterday  No smokign or vaping or marijuana  Stopped omeprazole for a week and then on pepcid and the stomach hurt a lot.     No nsaid's  Notes some stress with demented mother in law living with her.     CURRENT SYMPTOMS:      Abdominal pain:  Yes \"stomach burning\"    Constipation: Yes    Diarrhea:  No    Dumping Syndrome:  No    Experiencing hunger: No    Food Intolerances: Yes  \"Random can eat one day then the next time not good\"    Nausea/vomiting: Yes    Weight Regain: No     DIET HISTORY:       Carbonated Beverages: No          Caffeinated Beverages: Yes    Time to eat meals: 15-20  Minutes    Fluid intake: 100 oz/day      Protein Intake:  \"honestly I've not been tracking that & fact I have low protein #'s I would say no\" grams/day    Breakfast: Multi-grain Toast, SF Honey & SF Peanut Butter    Lunch: [email or web address] Blueberries    Dinner: Spinach Salad w/Chicken    Snacks: [employer] Fruit    GERD - Health Related Quality of Life Questionnaire (GERD- HRQL)  On PPI  Omeprazole Daily, Tried Pepcid AC for increased burning not effective, PRN Maalox & Pepto Bismal both help with the burning    How bad is the heartburn? 2 = Symptoms noticeable and bothersome but not every day  \"burning in stomach\"  Heartburn when lying down? 2 = Symptoms noticeable and bothersome but not every day  \"burning in stomach\"  Heartburn when standing up? 0 = No symptoms  Heartburn after meals? 0 = No symptoms  Does heartburn change your diet? 0 = No symptoms  Does heartburn wake you from sleep? 2 = Symptoms noticeable and bothersome but not every day  \"burning in stomach\"    Do you have difficulty " swallowing? 0 = No symptoms  Do you have pain with swallowing? 0 = No symptoms  If you take medication, does this affect your daily life? 1 = Symptoms noticeable but not bothersome    How bad is the regurgitation? 0 = No symptoms  Regurgitation when lying down? 0 = No symptoms  Regurgitation when standing up? 0 = No symptoms  Regurgitation after meals? 0 = No symptoms  Does regurgitation change your diet? 0 = No symptoms  Does regurgitation wake you from sleep? 0 = No symptoms    How satisfied are you with your present condition? Dissatisfied    Total score (calculated by summing the individual scores of questions 1-15): 7   Greatest possible score 75 (worst symptoms).   Lowest possible score 0 (no symptoms).    Heartburn score (calculated by summing the individual scores of questions 1-6): 6   Worst heartburn symptoms: 30.   No heartburn symptoms: 0.   Score less than or equal to 12 with each individual question not exceeding 2 indicate heartburn elimination.    Regurgitation score (calculated by summing the individual scores of questions 10-15): 0   Worst regurgitation symptoms: 30.   No regurgitation symptoms: 0.   Score less than or equal to 12 with each individual question not exceeding 2 indicate regurgitation elimination.     EXERCISE:  Yes 45-60 min 6 days/week      TEST RESULTS:    XR abdomen 2 views w chest 1 view  Status: Final result     PACS Images     Show images for XR abdomen 2 views w chest 1 view  Signed by    Signed Time Phone Pager   Sandi Edwards MD 9/05/2024 13:36 [phone]     Exam Information    Status Exam Begun Exam Ended   Final 8/28/2024 15:29 8/28/2024 16:05     Study Result    Narrative & Impression   Interpreted By:  Sandi Edwards,   STUDY:  XR ABDOMEN 2 VIEWS WITH CHEST 1 VIEW; ;  8/28/2024 4:05 pm      INDICATION:  Signs/Symptoms:constipation.      ,K59.09 Other constipation,K92.1 Melena      COMPARISON:  04/13/2012      ACCESSION NUMBER(S):  [id number]      ORDERING  CLINICIAN:  RACHEL LOVETT      FINDINGS:  There is moderate volume stool in the colon, more pronounced in the  right hemicolon. Otherwise bowel gas pattern is nonobstructive and  nonspecific. Multiple metallic clips projecting of the left upper  quadrant are most likely favored to be related to prior surgery. No  intraperitoneal free air is noted. Multiple phleboliths project over  the pelvis. There are mild discogenic degenerative changes in the  lower lumbar spine. Otherwise no acute osseous findings. Visualized  lungs appear clear without consolidative airspace opacities. Cardiac  silhouette is normal in size and morphology. No evidence of pleural  effusions or pneumothorax.      IMPRESSION:  1. Moderate volume stool in the colon, more pronounced in the right  hemicolon. Recommend correlation with constipation.  2. Otherwise nonspecific nonobstructive bowel gas pattern.              MACRO:  None      Signed by: Sandi Edwards 9/5/2024 1:36 PM  Dictation workstation:   [id number]     Result History    XR abdomen 2 views w chest 1 view (Order #[id number]) on 9/5/2024 - Order Result History Report      XR abdomen 2 views w chest 1 view: Result Notes     Rachel Lovett MD  9/5/2024  2:33 PM EDT       Official reading on the abdominal x-ray shows constipation as mentioned  Hopefully the MiraLAX helped, let me know if continued issues            Breast Imaging Recommendations  Leyda Blair  No recommendations exist for this order.         External Results Report    Open External Results Report    Encounter    View Encounter      Study Details    Open Study Details     Timeouts    None    XR abdomen 2 views w chest 1 view: Patient Communication     Append Comments   Seen    Official reading on the abdominal x-ray shows constipation as mentioned  Hopefully the MiraLAX helped, let me know if continued issues   Written by Rachel Lovett MD on 9/5/2024  2:33 PM EDT  Seen by patient Leyda Blair on  9/5/2024  2:34 PM  Department    Name Address Phone Fax   [facility] [address] [place] [address] [phone]     Amylase  Order: [id number]   Status: Final result       Visible to patient: Yes (seen)       Dx: Other constipation; Blood in stool; D...    1 Result Note       1 Patient Communication      Component  Ref Range & Units 1 mo ago   Amylase  29 - 103 U/L 27 Low    Resulting Agency [facility]              Specimen Collected: 08/28/24 14:58 Last Resulted: 08/28/24 20:24        Lab Flowsheet        Order Details        View Encounter        Lab and Collection Details        Routing        Result History     View All Conversations on this Encounter           Result Care Coordination      Result Notes and Patient Communication     Append Comments   Seen Back to Top    Labs are all normal   Written by Emily Kohler MD on 8/29/2024  2:07 PM EDT  Seen by patient Leyda Blair on 8/30/2024  7:29 AM       Comprehensive Metabolic Panel  Order: [id number]   Status: Final result       Visible to patient: Yes (seen)       Dx: Other constipation; Blood in stool; D...    1 Result Note       1 Patient Communication            Component  Ref Range & Units 1 mo ago  (8/28/24) 2 mo ago  (8/9/24) 1 yr ago  (8/24/23) 2 yr ago  (8/25/22) 2 yr ago  (5/23/22) 5 yr ago  (2/15/19) 6 yr ago  (4/14/18)   Glucose  74 - 99 mg/dL 83 137 High  91 88 90 R 94 86   Sodium  136 - 145 mmol/L 141 141 139 139 142 R 140 139   Potassium  3.5 - 5.3 mmol/L 4.3 4.3 4.4 4.0 4.2 R 4.3 4.0   Chloride  98 - 107 mmol/L 103 106 105 106 108 High  R 106 107   Bicarbonate  21 - 32 mmol/L 30 29 25 26 25 R 26 25   Anion Gap  10 - 20 mmol/L 12 10 13 11 9 R 12 11   Urea Nitrogen  6 - 23 mg/dL 10 12 15 12 12 R 11 11   Creatinine  0.50 - 1.05 mg/dL 0.71 0.77 0.73 0.74 0.7 R 0.67 0.67   eGFR  >60 mL/min/1.73m*2 >90 >90 CM        Comment: Calculations of estimated GFR are performed using the 2021 CKD-EPI Study Refit equation  without the race variable for the IDMS-Traceable creatinine methods.  https://jasn.asnjournals.org/content/early/2021/09/22/ASN.1580774459   Calcium  8.6 - 10.3 mg/dL 9.3 9.2 8.7 8.9 8.8 R 9.6 R 9.1   Albumin  3.4 - 5.0 g/dL 4.4 4.1 4.0 4.1  4.3 3.9   Alkaline Phosphatase  33 - 110 U/L 67 59 74 45  53 51   Total Protein  6.4 - 8.2 g/dL 6.7 6.3 Low  6.3 Low  6.8  6.6 6.4   AST  9 - 39 U/L 23 22 16 18  18 18   Bilirubin, Total  0.0 - 1.2 mg/dL 0.6 0.6 0.3 0.6  0.8 0.7   ALT  7 - 45 U/L 32 34 CM 14 CM 17 CM  17 CM 14 CM   Comment: Patients treated with Sulfasalazine may generate falsely decreased results for ALT.   Resulting [facility]              Specimen Collected: 08/28/24 14:58 Last Resulted: 08/28/24 20:24        Lab Flowsheet        Order Details        View Encounter        Lab and Collection Details        Routing        Result History     View All Conversations on this Encounter        CM=Additional comments  R=Reference range differs from displayed range        Result Care Coordination      Result Notes and Patient Communication     Append Comments   Seen Back to Top    Labs are all normal   Written by Emily Kohler MD on 8/29/2024  2:07 PM EDT  Seen by patient Leyda Blair on 8/30/2024  7:28 AM       Iron and TIBC  Order: [id number]   Status: Final result       Visible to patient: Yes (seen)       Dx: Malabsorption of iron (HHS-HCC); S/P ...    0 Result Notes            Component  Ref Range & Units 1 mo ago 2 mo ago 4 mo ago 8 mo ago 9 mo ago 11 mo ago 12 mo ago   Iron  35 - 150 ug/dL 121 132 82 112 54 39 31 Low    UIBC  110 - 370 ug/dL 160 144 218 162 237 313 313   TIBC  240 - 445 ug/dL 281 276 300 274 291 352 344   % Saturation  25 - 45 % 43 48 High  27 41 19 Low  11 Low  9 Low    Resulting [facility]              Specimen Collected: 08/28/24 14:58 Last Resulted: 08/28/24 20:24     "    Ferritin  Order: [id number]   Status: Final result       Visible to patient: Yes (seen)       Dx: Malabsorption of iron (HHS-HCC); S/P ...    0 Result Notes          Component  Ref Range & Units 1 mo ago 2 mo ago 4 mo ago 8 mo ago 9 mo ago   Ferritin  8 - 150 ng/mL 128 108 23 59 115   Resulting Agency [facility]              Specimen Collected: 08/28/24 14:58 Last Resulted: 08/28/24 20:24          OTHER PROVIDER LAST IMPRESSIONS VISIT NOTES:    Office Visit  9/9/2024  [facility]       Emily Kohler MD  Family Medicine Acute cystitis with hematuria  Dx UTI  Reason for Visit     Progress Notes  Emily Kohler MD (Physician)  Primary Care  Expand All Collapse All[]Expand All by Default     Subjective  Patient ID: Leyda Blair is a 48 y.o. female who presents for UTI. Sx's onset this morning; frequency, burning, bilateral flank pain, nausea, HA and hematuria.       Sxs started today      Back sore   Previous sxs resolved then recurred today         Difficulty Urinating   This is a new problem. The current episode started today. The problem occurs every urination. The problem has been waxing and waning. The quality of the pain is described as burning. The pain is at a severity of 4/10. The pain is moderate. There has been no fever. She is Sexually active. There is No history of pyelonephritis. Associated symptoms include flank pain, frequency, hematuria, nausea, sweats and urgency.         Review of Systems   Constitutional:  Negative for fever.   Gastrointestinal:  Positive for nausea.   Genitourinary:  Positive for dysuria, flank pain, frequency, hematuria and urgency.               Objective  /86   Pulse 95   Temp 36.8 °C (98.2 °F)   Ht 1.613 m (5' 3.5\")   Wt 78.5 kg (173 lb)   SpO2 95%   BMI 30.16 kg/m²      Physical Exam     Patient is alert and oriented, nontoxic  Heart regular, rate and rhythm  Back: mild CVAT  Abdomen: Mild suprapubic " "tenderness              Assessment/Plan  Problem List Items Addressed This Visit    None  Visit Diagnoses           Codes     Acute cystitis with hematuria    -  Primary N30.01     Relevant Medications     sulfamethoxazole-trimethoprim (Bactrim DS) 800-160 mg tablet     Other Relevant Orders     POCT UA Automated manually resulted (Completed)     Urine Culture                          Electronically signed by Emily Kohler MD at 9/9/2024  5:11 PM     Instructions    Recommend frequent fluid intake, you can take over-the-counter medications for symptom relief.  We will send urine for culture to confirm the infection and notify when results come back usually within 2-3 days.  Call if your symptoms worsen, especially fevers, worsening back pain, nausea, vomiting or diarrhea.               AVS - Outpatient (Printed 9/9/2024)  Additional Documentation    Vitals: /86     Pulse 95     Temp 36.8 °C (98.2 °F)     Ht 1.613 m (5' 3.5\")     Wt 78.5 kg (173 lb)     SpO2 95%     BMI 30.16 kg/m²     BSA 1.88 m²          More Vitals   Flowsheets: Patient Health Questionnaire-2/9,     BILLY AP SCORING,     Interfaced Flowsheet Data   Encounter Info: Billing Info,     History,     Allergies,     Developmental     Communications    View All Conversations on this Encounter  Travel Screening    Question 9/9/2024  2:34 PM EDT - Filed by Patient 8/28/2024  1:46 PM EDT - Filed by Patient   Do you have any of the following new or worsening symptoms? Abdominal pain Abdominal pain   Have you recently been in contact with someone who was sick? No / Unsure No / Unsure     Mychart Patient-Entered Hpi Selection Questionnaire    Question 9/9/2024  2:35 PM EDT - Filed by Patient   What is the primary reason for your visit? Painful Urination   Your painful urination Is new   When did you first notice your pain? Today   How often do you experience painful urination? Every urination   Since you first noticed this problem, how has it " changed? Always present, but gets better and worse   How would you describe your painful urination? Burning   How would you describe your current level of pain? Moderate   On a scale of 0 to 10 (10 being the worst), how severe is your painful urination? 4   How high has your fever been? No fever (less than 100.4 °F)   How long has your fever lasted?    Are you sexually active? Yes   Do you have a history of kidney infections? No   Are you experiencing any of the following symptoms with your painful urination?    Bloody urine Yes   Chills    Difficulty starting urination    Discharge    Frequent urination Yes   Nausea Yes   Pain on back and side (flank) Yes   Possible pregnancy    Sudden urge to urinate Yes   Sweats Yes   Vomiting        Office Visit  6/3/2024  [facility]       Abhay Arnold PA-C  Hematology and Oncology Malabsorption of iron (HHS-HCC) +3 more  Dx Follow-up; Referred by Soo Booker PA-C  Reason for Visit     Progress Notes  Abhay Arnold PA-C (Physician Assistant)  Hematology  Expand All Collapse All[]Expand All by Default  Patient ID: Leyda Blair is a 48 y.o. female.  Referring Physician: Soo Booker PA-C  Office Address Unavailable  as of 2/17/2024  Primary Care Provider: Emily Kohler MD  Visit Type: Follow Up     Location: [facility]  Diagnosis/Reason: LADONNA (2/2 Post-Surgical Malabsorption s/p RYGB)     Interval Hx:  6/3/24  Leyda Blair is a 48 y.o. female following up today for LADONNA (2/2 post-surgical malabsorption s/p RYGB)     NOTE:  6/3/24 - Patient was previously followed by Soo Booker PA-C but is now transferring care to me effective today. Their last visit was 1/29/24 and it was noted that patient had RYGB in 2013. She was also noted for having IV iron infusion in 2019 w/o adverse effect or reaction. She has also tried oral iron tablets and liquid iron supplementation without desired effect. She has also had partial hysterectomy around 2012.  With IKE Booker PA-C patient received IV venofer ,  and  without adverse effect or reaction.  Patient's recent labs indicate iron deficiency, therefore patient ordered a cycle of IV iron sucrose (venofer) at 300mg IV x 3 once weekly infusions     Patient denies weight loss, abnormal bruising and bleeding, hematuria, blood in stool, dark/black stools, epistaxis, oral/gingival bleeding, lymphadenopathy, recurrent infections, recurrent fevers, night sweats, early satiety, abdominal pain, bone pain, chest pain, palpitations, SOB, HAYES, fatigue, dizziness, lightheadedness, PICA.     Relevant Hx:  24 - Last Visit w/ HCHRISTIN Hall-CNP  Ms. Blair is a 48 years-old female. [facility] is [facility] to [facility] for the evaluation and treatment of iron deficiency anemia. Patient states that she had Crissy-En-Y about 10 years ago (). States that she has had IV iron infusion in . Patient states that she started oral iron tablets, initially once per day in August and on , she switched to oral liquid iron. States that she had partial hysterectomy about 12 years ago due to dysfunctional uterine bleeding. States that she had colonoscopy done less than 10 years ago. Reports that no polyps were removed.      PMHx:       Active Ambulatory Problems     Diagnosis Date Noted    Iron deficiency anemia 2023    Malabsorption of iron ([facility]) 2023    Intestinal malabsorption following gastrectomy ([facility]) 2024    Tinnitus of both ears 2024    Spontaneous  ([facility]) 2024    S/P bariatric surgery 2024    Presbyacusis 2024    Obesity, unspecified 2024    Malaise and fatigue 2024    Increased appetite 2024    Hematuria 2023    H/O: hysterectomy 2024    Hiatal hernia 2024    Esophageal reflux 2024    Constipation 2024    Anxiety 2024    Anemia 2023           Resolved Ambulatory Problems     Diagnosis Date  Noted    Bilateral impacted cerumen 02/12/2024    Herpes zoster without complication 02/26/2024           Past Medical History:   Diagnosis Date    Abnormal weight loss 06/16/2016    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    Personal history of diseases of the blood and blood-forming organs and certain disorders involving the immune mechanism 04/16/2018    Personal history of other specified conditions 02/15/2019    Personal history of other specified conditions 12/05/2016    Right upper quadrant pain 12/05/2016    Unspecified contact dermatitis due to plants, except food 05/08/2017      PSHx:  Surgical History         Past Surgical History:   Procedure Laterality Date    GASTRIC BYPASS   04/03/2013     Gastric Surgery For Morbid Obesity Bypass With Crissy-en-Y    OTHER SURGICAL HISTORY   02/15/2019     Hysterectomy            FHx:  Family History   No family history on file.         Social Hx:  Leyda Blair    reports that she has never smoked. She has never been exposed to tobacco smoke. She has never used smokeless tobacco.  She  reports that she does not currently use alcohol.  She  reports no history of drug use.  Social History   Social History            Socioeconomic History    Marital status:        Spouse name: Not on file    Number of children: Not on file    Years of education: Not on file    Highest education level: Not on file   Occupational History    Not on file   Tobacco Use    Smoking status: Never       Passive exposure: Never    Smokeless tobacco: Never   Substance and Sexual Activity    Alcohol use: Not Currently    Drug use: Never    Sexual activity: Not on file   Other Topics Concern    Not on file   Social History Narrative    Not on file      Social Determinants of Health      Financial Resource Strain: Not on file   Food Insecurity: Not on file   Transportation Needs: Not on file   Physical Activity: Not on file   Stress: Not on file    Social Connections: Not on file   Intimate Partner Violence: Not on file   Housing Stability: Not on file         Medications and allergies reviewed in EMR.     ROS:  Review of Systems - Oncology   10 point review of systems negative except as state in HPI.     Vitals & Statistics:     Objective  BSA: There is no height or weight on file to calculate BSA.  There were no vitals taken for this visit.     Physical Exam:  Physical Exam  Vitals and nursing note reviewed.   Constitutional:       Appearance: Normal appearance. She is normal weight.   HENT:      Head: Normocephalic and atraumatic.      Right Ear: External ear normal.      Left Ear: External ear normal.      Nose: Nose normal.      Mouth/Throat:      Mouth: Mucous membranes are moist.      Pharynx: Oropharynx is clear.   Eyes:      Extraocular Movements: Extraocular movements intact.      Conjunctiva/sclera: Conjunctivae normal.      Pupils: Pupils are equal, round, and reactive to light.   Cardiovascular:      Rate and Rhythm: Normal rate and regular rhythm.      Pulses: Normal pulses.      Heart sounds: Normal heart sounds.   Pulmonary:      Effort: Pulmonary effort is normal.      Breath sounds: Normal breath sounds.   Abdominal:      General: Abdomen is flat. Bowel sounds are normal.      Palpations: Abdomen is soft.      Comments: No masses or organomegaly palpable during exam   Musculoskeletal:         General: Normal range of motion.      Cervical back: Normal range of motion.   Lymphadenopathy:      Comments: No lymphadenopathy palpable   Skin:     General: Skin is warm and dry.      Capillary Refill: Capillary refill takes less than 2 seconds.   Neurological:      Mental Status: She is alert and oriented to person, place, and time. Mental status is at baseline.   Psychiatric:         Mood and Affect: Mood normal.         Behavior: Behavior normal.         Thought Content: Thought content normal.         Judgment: Judgment normal.              "  Results:        Lab Results   Component Value Date     WBC 5.0 05/24/2024     NEUTROABS 2.30 05/24/2024     IGABSOL 0.01 05/24/2024     LYMPHSABS 1.88 05/24/2024     MONOSABS 0.38 05/24/2024     EOSABS 0.32 05/24/2024     BASOSABS 0.07 05/24/2024     RBC 4.36 05/24/2024     MCV 97 05/24/2024     MCHC 32.1 05/24/2024     HGB 13.6 05/24/2024     HCT 42.4 05/24/2024      05/24/2024      No results found for: \"RETICCTPCT\"         Lab Results   Component Value Date     CREATININE 0.73 08/24/2023     BUN 15 08/24/2023     EGFR 107 05/23/2022      08/24/2023     K 4.4 08/24/2023      08/24/2023     CO2 25 08/24/2023            Lab Results   Component Value Date     ALT 14 08/24/2023     AST 16 08/24/2023     ALKPHOS 74 08/24/2023     BILITOT 0.3 08/24/2023            Lab Results   Component Value Date     TSH 2.42 09/12/2023            Lab Results   Component Value Date     TSH 2.42 09/12/2023     D2XQTNO 7.0 08/25/2022            Lab Results   Component Value Date     IRON 82 05/24/2024     TIBC 300 05/24/2024     FERRITIN 23 05/24/2024            Lab Results   Component Value Date     AECLOEYG42 554 01/24/2024      No results found for: \"FOLATE\"  No results found for: \"GREGG\", \"RF\", \"SEDRATE\"   No results found for: \"CRP\"   No results found for: \"MINOO\"  No results found for: \"LDH\"  No results found for: \"HAPTOGLOBIN\"  No results found for: \"SPEP\"  No results found for: \"IGG\", \"IGM\", \"IGA\"  No results found for: \"HEPATOT\", \"HEPAIGM\", \"HEPBCIGM\", \"HEPBCAB\", \"HEPBSAG\", \"HEPCAB\"  No results found for: \"HIV1X2\"     Assessment:  Leyda Blair is a 48 y.o. female following up today for LADONNA (2/2 post-surgical malabsorption)     I reviewed patient's chart including but not limited to labs, imaging, surgical/procedure notes, pathology, hospital notes, doctor's notes.     5/24/24 results:  WBC & Differential WNL  RBC indices & H&H WNL  Platelets WNL  Iron studies: Ferritin low for this practice at 23, Iron and " TIBC WNL, %Sat WNL at 27%     Plan:  LADONNA 2/2 Post-Surgical Malabsorption s/p RYGB  Lab results pending - Will review when available and address adverse results as needed  Request prior authorization for IV iron sucrose (Venofer) 300mg weekly x 3  Dx Codes: D64.9 - Anemia, E61.1 - Iron Deficiency, K91.2 - Post-surgical malabsorption, Z98.84 - S/P Bariatric Sx  Anticipated start date: 6/17, 6/24, 6/31  Will switch to IV ferumoxytol (Feraheme) 510mg weekly x 2 if preferred by insurance  Recheck labs in 2 months after final IV iron infusion  CBC-D, Iron studies  RTC in 4 months via in-person visit - F/U sooner if needed/urgent  CBC-D, Iron studies up to 1 week prior     I had an extensive discussion with the patient regarding the diagnosis and discussed the plan of therapy, including general considerations regarding side effects and outcomes. Pt understood and gave appropriate teach back about the plan of care. All questions were answered to the patient's satisfaction. The patient is instructed to contact us at any time if questions or problems arise. Thank you for the opportunity to participate in the care of this very pleasant patient.     Total time = 30 minutes. 50% or more of this time was spent in counseling and/or coordination of care including reviewing medical history/radiology/labs, examining patient, formulating outlined plan with team, and discussing plan with patient/family.     Abhay Arnold PA-C                Electronically signed by Abhay Arnold PA-C at 6/3/2024  9:30 AM       PROVIDER LAST IMPRESSIONS VISIT NOTE 1/9/2017:        Chief Complaint  FUP: lap ernesto      History of Present Illness  41 year old female being seen here in clinic for problems with pain after lap ernesto; DOS 12/9/16      Active Problems   · Abdominal wall pain in right upper quadrant (789.01) (R10.11)   · Anemia (285.9) (D64.9)   · Anxiety (300.00) (F41.9)   · Hernia, hiatal (553.3) (K44.9)   · Increased appetite (783.6)  (R63.2)   · Intestinal malabsorption following gastrectomy (579.3) (K91.2,Z90.3)   · Nausea and vomiting (787.01) (R11.2)   · Rapid weight loss (783.21) (R63.4)   · S/P bariatric surgery (V45.86) (Z98.84)   · Thrombosed external hemorrhoids (455.4) (K64.5)   · Thrombosed hemorrhoids (455.7) (K64.5)    Surgical History   · History of Gastric Surgery For Morbid Obesity Bypass With Crissy-en-Y    Family History   · No pertinent family history   · No pertinent family history    Social History   · Employed   ·    · Never smoker   · No alcohol use    Allergies   · No Known Drug Allergies  Recorded By: Ольга Reynoso; 11/28/2012 4:49:36 PM    Current Meds   · Colace 100 MG Oral Capsule; TAKE 1 CAPSULE TWICE DAILY AS NEEDED;  Therapy: 88Osn2589 to (Evaluate:28Kun6065)  Requested for: 51Dba0391; Last  Rx:[id number]; Status: ACTIVE - Retrospective By Protocol Authorization Ordered  Rx By: Chiquis Mckeon; Dispense: 30 Days ; #:60 Capsule; Refill: 1;   For: Thrombosed external hemorrhoids; KYRA = N; Verified Transmission to   [facility]; Last Updated By: System, [employer]; 05/12/2016   10:28:03 AM   · ProctoCare-HC 2.5 % CREA; APPLY ONCE DAILY AS NEEDED;  Therapy: 05Pve0959 to (Last Rx:[id number])  Requested for: 54Iyl9757; Status:  ACTIVE - Retrospective By Protocol Authorization Ordered  Rx By: Chiquis Mckeon; Dispense: 0 Days ; #:1 X 30 GM Tube; Refill: 1;   For: Thrombosed hemorrhoids; KYRA = N; Verified Transmission to [facility]; Last Updated By: System, [employer]Script[employer]; 05/12/2016 10:18:40 AM   · Escitalopram Oxalate 20 MG Oral Tablet;  Therapy: 26Oct2015 to Recorded  Rx By: BONY; Dispense: 90 Days ; #:90 TABS; Refill: 0; KYRA = N; Record; Last   Updated By: Gladys Burnette; 10/14/2016 3:58:08 PM   · Oxycodone-Acetaminophen 5-325 MG Oral Tablet; TAKE 1 TABLET BY MOUTH 4 TIMES  A DAY AS NEEDED FOR PAIN;  Therapy: 94Vvn6751 to Recorded  Rx By: BONY; Dispense: 10 Days ; #:40 TABS; Refill:  "0; KYRA = N; Record; Last   Updated By: Gladys Burnette; 10/14/2016 3:58:08 PM   · Pantoprazole Sodium 40 MG Oral Tablet Delayed Release;  Therapy: 12Oct2015 to Recorded  Rx By: BONY; Dispense: 30 Days ; #:30 TBEC; Refill: 0; KYRA = N; Record; Last   Updated By: Gladys Burnette; 10/14/2016 3:58:08 PM   · Sucralfate 1 GM Oral Tablet;  Therapy: 30Ate8350 to Recorded  Rx By: BONY; Dispense: 10 Days ; #:40 TABS; Refill: 0; KYRA = N; Record; Last   Updated By: Gladys Burnette; 10/14/2016 3:58:08 PM    Results/Data  Surgical Pathology 63Vie8080 12:00AM Chiquis Cifuentes     Test Name Result Flag Reference   Surgical Pathology-[facility] (Report)     Name ALIVIA COSTA                                                    Accession #: [id number]       Pathologist:          ANIBAL ALBARRAN MD  Date of Procedure:  12/9/2016  Date Received:     12/9/2016  Date Reported      12/12/2016  Submitting Physician:  CHIQUIS CIFUENTES MD,MPH  Location:          [facility]                                   FINAL DIAGNOSIS  A. GALLBLADDER, CHOLECYSTECTOMY:  --GALLBLADDER, NO SIGNIFICANT PATHOLOGIC ABNORMALITY.          [IMAGE:staintext]                                                                                                                                                                                                                                                Electronically Signed Out By ANIBAL ALBARRAN MD/MRG1         Clinical History:  Right upper quadrant abdominal pain, cholelithiasis    Specimens Submitted As:  A: GALLBLADDER     Gross Description:  Received in formalin labeled with the patient's name and hospital number, and  \"A\" is an intact gallbladder, measuring 8.8 x 3.5 x 3.3 cm. The serosal  surface is smooth, and glistening. The wall measures up to 0.1 cm in greatest  thickness. The lumen contains mucoid bile. Upon filtering of the bile, no  calculi are present. The mucosal surface is unremarkable. " Representative  sections consisting of the cystic duct margin, and gallbladder wall, are  submitted in one cassette.  [facility]  Department of Pathology  [id number] [place] [id number]  Phone: [phone] Fax: [phone]    [facility]/12/9/2016     PT + INR, Plasma [id number] 02:33PM Chiquis Mckeon     Test Name Result Flag Reference   PROTIME 11.9 sec  9.7-12.5   PT,INR 1.1  0.9-1.1   PROTIME 11.9 sec  9.7-12.5   PT,INR 1.1  0.9-1.1           Iron, Serum 42Hpq4563 02:33PM Chiquis Mckeon     Test Name Result Flag Reference   Iron, Serum 89 ug/dL                   Comprehensive Metabolic Panel 50Yqo0794 02:33PM Chiquis Mckeon     Test Name Result Flag Reference   Glucose, Serum 95 mg/dL  74-99   Sodium, Serum 140 mmol/L  136-145   Potassium, Serum 4.1 mmol/L  3.5-5.3   Chloride, Serum 105 mmol/L     Bicarbonate, Serum 26 mmol/L  21-32   Anion Gap, Serum 13 mmol/L  10-20   Blood Urea Nitrogen, Serum 12 mg/dL  6-23   Creatinine, Serum 0.73 mg/dL  0.50-1.05   Calcium, Serum 9.4 mg/dL  8.6-10.3   Albumin, Serum 4.3 g/dL  3.4-5.0   Alkaline Phosphatase, Serum 53 U/L     Protein, Total Serum 7.0 g/dL  6.4-8.2   AST (SGOT), Serum 17 U/L  13-39   Bilirubin, Serum Total 0.5 mg/dL  0.0-1.2   ALT (SGPT), Serum 17 U/L  7-45   Glucose, Serum 95 mg/dL  74-99   Sodium, Serum 140 mmol/L  136-145   Potassium, Serum 4.1 mmol/L  3.5-5.3   Chloride, Serum 105 mmol/L     Bicarbonate, Serum 26 mmol/L  21-32   Anion Gap, Serum 13 mmol/L  10-20   Blood Urea Nitrogen, Serum 12 mg/dL  6-23   Creatinine, Serum 0.73 mg/dL  0.50-1.05   Calcium, Serum 9.4 mg/dL  8.6-10.3   Albumin, Serum 4.3 g/dL  3.4-5.0   Alkaline Phosphatase, Serum 53 U/L     Protein, Total Serum 7.0 g/dL  6.4-8.2   AST (SGOT), Serum 17 U/L  13-39   Bilirubin, Serum Total 0.5 mg/dL  0.0-1.2   ALT (SGPT), Serum 17 U/L  7-45           Glomerular Filtration Rate 77Kki8205 02:33PM Linda,  Chiquis     Test Name Result Flag Reference   GFR- Non  >60  See Below   Reference Range: >60 mL/min/1.73m2   GFR-African American >60  See Below   Reference Range: >60 mL/min/1.73m2   CALCULATIONS OF ESTIMATED GFR ARE PERFORMED   USING THE MDRD STUDY EQUATION FOR THE   IDMS-TRACEABLE CREATININE METHODS.   CLIN CHEM 2007;53:766-72     Ultrasound Gallbladder 71Pws2669 12:42PM Chiquis Mckeon   [Dec 5, 2016 12:08PM Chiquis Mckeon]  Reason: Unspecified for Ultrasound Gallbladder     Test Name Result Flag Reference   Ultrasound Gallbladder (Report)     Interpreted by: DAVID REYES  12/05/16 12:53  CLINICAL HISTORY/right upper quadrant pain     EXAMINATION:Right upper quadrant ultrasound     COMPARISON:No Comparisons Available     FINDINGS:Multiple sonographic images of the right upper quadrant are   performed.     The liver reaches 16.8 cm in length. The liver is of uniform   echotexture. There is no space-occupying mass or surrounding fluid.     There is no intrahepatic or hepatic bile duct dilatation. The common   duct measures 3.3 mm in diameter.     The right renal cortex is hypoechoic to liver. The right kidney   measures 10.3 cm in length. There is no right-sided hydronephrosis.     There is no shadowing calculus in the gallbladder. There is no   gallbladder wall thickening or surrounding fluid.     The visualized portions of the pancreas are unremarkable. The   majority of the pancreas is obscured by shadowing bowel gas.           CONCLUSION:Normal right upper quadrant ultrasound allowing for poor   visualization of the pancreas.  Transcribed by: Anmol Sky  Electronically signed by: Anmol Sky  12/05/16 12:53     Complete Blood Count [id number] 11:58AM MarinzoëChiquis     Test Name Result Flag Reference   White Blood Cell Count 6.0 X10E9/L  4.4-11.3   Red Blood Cell Count 4.50 X10E12/L  4.00-5.20   Hemoglobin 13.0 g/dL  12.0-16.0   Hematocrit 40.4 %  36.0-46.0    MCV 90 fL     MCHC 32.2 g/dL  32.0-36.0   Platelet Count 239 X10E9/L  150-450   RDW-CV 16.5 % H 11.5-14.5   White Blood Cell Count 6.0 X10E9/L  4.4-11.3   Red Blood Cell Count 4.50 X10E12/L  4.00-5.20   Hemoglobin 13.0 g/dL  12.0-16.0   Hematocrit 40.4 %  36.0-46.0   MCV 90 fL     MCHC 32.2 g/dL  32.0-36.0   Platelet Count 239 X10E9/L  150-450   RDW-CV 16.5 % H 11.5-14.5           Signatures  Electronically signed by : Chiquis Mckeon MD; Jan 19 2017 11:34AM EST      PROVIDER LAST IMPRESSIONS VISIT NOTE 12/5/2016:        Provider Impressions  42 YO Female 4 years s/p RYGBP with anemia and c/o fatigue, nausea, dizziness, lightheadedness, blurred vision and tinnitus. She reports eating a well-balanced diet and taking all recommended supplements. She received 3 iron infusions and had some relief of symptoms but they have all returned. She has done well with her weight loss, BMI is currently 30.2. Iron % sat was 8 and total Iron was 27. I believe the anemia is primarily from poor intake. I discussed that she should take in more iron. She is seeing hematologist on 23rd. She feels worse than before the infusion. Overall the pouch is good and I am concerened about her intake. She says the omeprazole occ when has symptoms. She had no issues with anemia for 3 years and then started with problems.     Her u/s today is normal but labs pending. She has nausea after eating, focally tender in ruq. No other etiology is clear. She clearly does not feel well. Clinically I believe her GB is causing many of her symptoms. We discussed the ernesto at length. She understands the risks and beneftis of the procedure.1        1 Amended By: Chiquis Mckeon; Dec 05 2016 2:20 PM EST    Patient Discussion/Summary    Continue to eat 60 grams of protein and 60 oz of fluid daily.  Continue to take multivitamin and supplements as directed.   - Make sure you take all of your recommended supplements  - Will continue with iron  transfusions.  - Continue to eat protein with a goal of 60 grams   - Make sure you are getting all required fluid oz.   - Will recheck labs today and get you into hematologist sooner.   Eat more iron rich foods like spinach, liver and lamb.   Start a vitamin C supplement. Chewable is great.       CURRENT MEDICATIONS:    Current Outpatient Medications   Medication Sig Dispense Refill    buPROPion XL (Wellbutrin XL) 150 mg 24 hr tablet Take 1 tablet (150 mg) by mouth once daily in the morning. 90 tablet 1    CALCIUM CARBONATE-VITAMIN D3 ORAL Take by mouth.      multivitamin capsule Take by mouth.      omeprazole (PriLOSEC) 20 mg DR capsule Take by mouth twice a day.      venlafaxine XR (Effexor-XR) 150 mg 24 hr capsule Take 1 capsule (150 mg) by mouth once daily. 90 capsule 3     No current facility-administered medications for this visit.       PAST MEDICAL HISTORY:    Patient Active Problem List   Diagnosis    Iron deficiency anemia    Malabsorption of iron (HHS-HCC)    Intestinal malabsorption following gastrectomy (HHS-HCC)    Tinnitus of both ears    Bariatric surgery status    Presbyacusis    Obesity, unspecified    Malaise and fatigue    Increased appetite    H/O: hysterectomy    Hiatal hernia    Esophageal reflux    Constipation    Anxiety    Anemia    Clotted blood in stool       PAST SURGICAL HISTORY:  Past Surgical History:   Procedure Laterality Date    GASTRIC BYPASS  04/03/2013    Gastric Surgery For Morbid Obesity Bypass With Crissy-en-Y    OTHER SURGICAL HISTORY  02/15/2019    Hysterectomy       FAMILY HISTORY:  No family history on file.    PAST SOCIAL HX:  Social History     Socioeconomic History    Marital status:      Spouse name: Not on file    Number of children: Not on file    Years of education: Not on file    Highest education level: Not on file   Occupational History    Not on file   Tobacco Use    Smoking status: Never     Passive exposure: Never    Smokeless tobacco: Never   Substance  and Sexual Activity    Alcohol use: Not Currently    Drug use: Never    Sexual activity: Not on file   Other Topics Concern    Not on file   Social History Narrative    Not on file     Social Determinants of Health     Financial Resource Strain: Not on file   Food Insecurity: Not on file   Transportation Needs: Not on file   Physical Activity: Not on file   Stress: Not on file   Social Connections: Not on file   Intimate Partner Violence: Not on file   Housing Stability: Not on file       ALLERGIES:  No Known Allergies    DAILY SUPPLEMENTS:  Calcium: Calcium Citrate w/ vitamin D (1200 - 1500mg)  Multivitamin & Minerals: 2 per day  Iron Supplement: included in multi-vitamin  Vitamin B12: 1000 mcg   Vitamin D3: 3000 units    REVIEW OF SYSTEMS:  CONSTITUTIONAL: Patient denies fevers, chills, sweats and weight changes.  EYES: Patient denies any visual symptoms.  EARS, NOSE, AND THROAT: No difficulties with hearing. No symptoms of rhinitis or sore throat.  CARDIOVASCULAR: Patient denies chest pains, palpitations, orthopnea and paroxysmal nocturnal dyspnea.  RESPIRATORY: No dyspnea on exertion, no wheezing or cough.  GI: No nausea, vomiting, diarrhea, constipation, abdominal pain, hematochezia or melena.  : No urinary hesitancy or dribbling. No nocturia or urinary frequency. No abnormal urethral discharge.  MUSCULOSKELETAL: No myalgias or arthralgias.  NEUROLOGIC: No chronic headaches, no seizures. Patient denies numbness, tingling or weakness.  PSYCHIATRIC: Patient denies problems with mood disturbance. No problems with anxiety.  ENDOCRINE: No excessive urination or excessive thirst.  DERMATOLOGIC: Patient denies any rashes or skin changes.    PHYSICAL EXAM:  Visit Vitals  /86   Pulse 74     GENERAL: Obese. No apparent distress. Pt is alert and oriented x3.  She is walking bent over due to her back pain  HEENT: Head is normocephalic and atraumatic. Extraocular muscles are intact. Pupils are equal, round, and  reactive to light and accommodation. Nares appeared normal. Mouth is well hydrated and without lesions. Mucous membranes are moist. Posterior pharynx clear of any exudate or lesions.  NECK: Supple. No carotid bruits. No lymphadenopathy or thyromegaly.  LUNGS: Clear to auscultation.  HEART: Regular rate and rhythm without murmur.  ABDOMEN: Soft, tender in the upper abdomen, no rebound or guarding and nondistended. Positive bowel sounds. No hepatosplenomegaly was noted.  EXTREMITIES: Without any cyanosis, clubbing, rash, lesions or edema.  NEUROLOGIC: Cranial nerves II through XII are grossly intact.  PSYCHIATRIC: Flat affect, but denies suicidal or homicidal ideations.  SKIN: No ulceration or induration present.      PROVIDER VISIT IMPRESSIONS: 49-year-old 12 years post Crissy-en-Y gastric bypass procedure who is done excellent with her weight loss and has maintained a BMI of 29.  Over the last year she has noted worsening constipation anemia and blood clots in her stool.  This is accompanied with some epigastric pain and some lower back pain.  She follows a reasonable diet but she has been noted to have some low protein and is needed to iron infusions in the last year.  She is diffusely tender in the abdomen worst in the midepigastrium.  Overall she is a very compliant patient and denies NSAIDs and smoking.  However her history sounds like there could be an anastomotic ulcer of ulcer versus ulcers in her remnant stomach.  We will switch her and PPI to a capsule that she can open and take every morning.  We will do an upper and lower endoscopy for the blood in her stool.  She notes she has seen emile clots.  We will resume the fiber for her constipation.  We will check nutrition labs as those has not been checked in a while.  And due to her diffuse tenderness and back pain we will also do a CAT scan for further evaluation to look for any evidence of internal hernias. Counseled heavily on diet and exercise and eating  4-5 times/day with protein and veggies    Plan:  Follow the pouch rules:    - - Eat 5 small meals per day (3 meals and 2 snacks)  - Take in at least 60 g protein daily (try to get through lean meats, dairy, legumes)  - Eat 5 vegetables per day  - No sweets, fruits are fine.  Berries and citrus are lower glycemic index fruits  -Limit rice, bread, pasta and potatoes.  These should only be consumed after having your protein and vegetables  - Drink at least 60 oz fluid daily, (non caffeinated, no carbonated beverages, sugar free)  - Get 60 minutes of exercise daily     Preplan the meals.  We will an upper endoscopy  We will check for blood in stool  Resume metamucil for the constipation  Open the capsule and take in the morning for omeprazole  We should do a colonoscopy  We will do an abdominal CT    Congratulations, you are doing great 12 yrs post-op.      Please have your yearly lab work done (if you have not already) - We will call you with any abnormal lab results.    Be sure to continue with exercise, goal should be 3-5 times a week 60 minutes at a time.    Increase variety and intensity of your work out routine.    Make sure you are taking your multivitamin, B12 and calcium.    See the Dietician as needed.    Dr. Chiquis Mckeon M.D., MPH  Director of Bariatric & Minimally Invasive Surgery  [place] [id number]  T:  [phone]  F:  [phone]     Jacey Smalls, RN Assistant Nurse Manager, Care Coordinator - Bariatric Surgery [facility] Patient Nursing Contact  T:  [phone]  F:  [phone]

## 2024-10-09 ENCOUNTER — TELEPHONE (OUTPATIENT)
Dept: SURGERY | Facility: CLINIC | Age: 49
End: 2024-10-09
Payer: COMMERCIAL

## 2024-10-09 NOTE — TELEPHONE ENCOUNTER
Thank you for speaking with me earlier today & confirming your scheduled visit with Dr. Mckeon on 10/14/24 10:00 AM at [facility] [address] ([address]) [place] [address] (inside [facility], main floor in the Specialty Clinic).   Parking is available at a cost of $5.00 at the Main Entrance.   Please note our clinic exam rooms are air conditioned so you may want to bring a jacket.  We look forward to seeing you, Yomaira Luna LPN Clinic Nurse.

## 2024-10-14 ENCOUNTER — NURSE ONLY (OUTPATIENT)
Dept: SURGERY | Facility: CLINIC | Age: 49
End: 2024-10-14

## 2024-10-14 ENCOUNTER — APPOINTMENT (OUTPATIENT)
Dept: SURGERY | Facility: CLINIC | Age: 49
End: 2024-10-14
Payer: COMMERCIAL

## 2024-10-14 ENCOUNTER — LAB (OUTPATIENT)
Dept: LAB | Facility: LAB | Age: 49
End: 2024-10-14
Payer: COMMERCIAL

## 2024-10-14 VITALS
WEIGHT: 167 LBS | DIASTOLIC BLOOD PRESSURE: 86 MMHG | SYSTOLIC BLOOD PRESSURE: 131 MMHG | BODY MASS INDEX: 29.12 KG/M2 | HEART RATE: 74 BPM

## 2024-10-14 DIAGNOSIS — Z90.3 INTESTINAL MALABSORPTION FOLLOWING GASTRECTOMY (HHS-HCC): ICD-10-CM

## 2024-10-14 DIAGNOSIS — Z13.21 ENCOUNTER FOR VITAMIN DEFICIENCY SCREENING: ICD-10-CM

## 2024-10-14 DIAGNOSIS — Z98.84 STATUS POST BARIATRIC SURGERY: ICD-10-CM

## 2024-10-14 DIAGNOSIS — K90.9 MALABSORPTION OF IRON (HHS-HCC): ICD-10-CM

## 2024-10-14 DIAGNOSIS — D50.9 IRON DEFICIENCY ANEMIA, UNSPECIFIED IRON DEFICIENCY ANEMIA TYPE: ICD-10-CM

## 2024-10-14 DIAGNOSIS — K21.9 GASTROESOPHAGEAL REFLUX DISEASE, UNSPECIFIED WHETHER ESOPHAGITIS PRESENT: Primary | ICD-10-CM

## 2024-10-14 DIAGNOSIS — Z98.84 BARIATRIC SURGERY STATUS: ICD-10-CM

## 2024-10-14 DIAGNOSIS — K91.2 INTESTINAL MALABSORPTION FOLLOWING GASTRECTOMY (HHS-HCC): ICD-10-CM

## 2024-10-14 DIAGNOSIS — K59.09 OTHER CONSTIPATION: ICD-10-CM

## 2024-10-14 DIAGNOSIS — Z98.84 S/P BARIATRIC SURGERY: ICD-10-CM

## 2024-10-14 DIAGNOSIS — K92.1 CLOTTED BLOOD IN STOOL: Primary | ICD-10-CM

## 2024-10-14 DIAGNOSIS — R10.13 EPIGASTRIC PAIN: ICD-10-CM

## 2024-10-14 LAB
25(OH)D3 SERPL-MCNC: 30 NG/ML (ref 30–100)
BASOPHILS # BLD AUTO: 0.06 X10*3/UL (ref 0–0.1)
BASOPHILS NFR BLD AUTO: 1.3 %
EOSINOPHIL # BLD AUTO: 0.13 X10*3/UL (ref 0–0.7)
EOSINOPHIL NFR BLD AUTO: 2.8 %
ERYTHROCYTE [DISTWIDTH] IN BLOOD BY AUTOMATED COUNT: 12.2 % (ref 11.5–14.5)
FERRITIN SERPL-MCNC: 68 NG/ML (ref 8–150)
HCT VFR BLD AUTO: 43.7 % (ref 36–46)
HGB BLD-MCNC: 14.4 G/DL (ref 12–16)
IMM GRANULOCYTES # BLD AUTO: 0.01 X10*3/UL (ref 0–0.7)
IMM GRANULOCYTES NFR BLD AUTO: 0.2 % (ref 0–0.9)
IRON SATN MFR SERPL: 58 % (ref 25–45)
IRON SERPL-MCNC: 174 UG/DL (ref 35–150)
LYMPHOCYTES # BLD AUTO: 1.96 X10*3/UL (ref 1.2–4.8)
LYMPHOCYTES NFR BLD AUTO: 42.3 %
MCH RBC QN AUTO: 31 PG (ref 26–34)
MCHC RBC AUTO-ENTMCNC: 33 G/DL (ref 32–36)
MCV RBC AUTO: 94 FL (ref 80–100)
MONOCYTES # BLD AUTO: 0.4 X10*3/UL (ref 0.1–1)
MONOCYTES NFR BLD AUTO: 8.6 %
NEUTROPHILS # BLD AUTO: 2.07 X10*3/UL (ref 1.2–7.7)
NEUTROPHILS NFR BLD AUTO: 44.8 %
NRBC BLD-RTO: 0 /100 WBCS (ref 0–0)
PLATELET # BLD AUTO: 275 X10*3/UL (ref 150–450)
PTH-INTACT SERPL-MCNC: 68.2 PG/ML (ref 18.5–88)
RBC # BLD AUTO: 4.64 X10*6/UL (ref 4–5.2)
TIBC SERPL-MCNC: 299 UG/DL (ref 240–445)
UIBC SERPL-MCNC: 125 UG/DL (ref 110–370)
WBC # BLD AUTO: 4.6 X10*3/UL (ref 4.4–11.3)

## 2024-10-14 PROCEDURE — 99205 OFFICE O/P NEW HI 60 MIN: CPT | Performed by: SURGERY

## 2024-10-14 PROCEDURE — 82306 VITAMIN D 25 HYDROXY: CPT

## 2024-10-14 PROCEDURE — 84590 ASSAY OF VITAMIN A: CPT

## 2024-10-14 PROCEDURE — 85025 COMPLETE CBC W/AUTO DIFF WBC: CPT

## 2024-10-14 PROCEDURE — 83970 ASSAY OF PARATHORMONE: CPT

## 2024-10-14 PROCEDURE — 36415 COLL VENOUS BLD VENIPUNCTURE: CPT

## 2024-10-14 PROCEDURE — 83550 IRON BINDING TEST: CPT

## 2024-10-14 PROCEDURE — 83540 ASSAY OF IRON: CPT

## 2024-10-14 PROCEDURE — 84630 ASSAY OF ZINC: CPT

## 2024-10-14 PROCEDURE — 82525 ASSAY OF COPPER: CPT

## 2024-10-14 PROCEDURE — 84425 ASSAY OF VITAMIN B-1: CPT

## 2024-10-14 PROCEDURE — 82728 ASSAY OF FERRITIN: CPT

## 2024-10-14 RX ORDER — OMEPRAZOLE 20 MG/1
40 CAPSULE, DELAYED RELEASE ORAL
Qty: 120 CAPSULE | Refills: 11 | Status: SHIPPED | OUTPATIENT
Start: 2024-10-14 | End: 2025-10-14

## 2024-10-14 NOTE — PROGRESS NOTES
10/14/24 [facility] Clean Up     Ordered: CT abdomen/pelvis, occult stool test, Yrly Labs  Procedures Ordered: EGD/Colonoscopy - Sent to [facility] for scheduling  Meds - Omeprazole - Pend and Send to [facility] after testing

## 2024-10-14 NOTE — PATIENT INSTRUCTIONS
Plan:  Follow the pouch rules:    - - Eat 5 small meals per day (3 meals and 2 snacks)  - Take in at least 60 g protein daily (try to get through lean meats, dairy, legumes)  - Eat 5 vegetables per day  - No sweets, fruits are fine.  Berries and citrus are lower glycemic index fruits  -Limit rice, bread, pasta and potatoes.  These should only be consumed after having your protein and vegetables  - Drink at least 60 oz fluid daily, (non caffeinated, no carbonated beverages, sugar free)  - Get 60 minutes of exercise daily     Preplan the meals.  We will an upper endoscopy  We will check for blood in stool  Resume metamucil for the constipation  Open the capsule and take in the morning for omeprazole  We should do a colonoscopy  We will do an abdominal CT    Congratulations, you are doing great 12 yrs post-op.      Please have your yearly lab work done (if you have not already) - We will call you with any abnormal lab results.    Be sure to continue with exercise, goal should be 3-5 times a week 60 minutes at a time.    Increase variety and intensity of your work out routine.    Make sure you are taking your multivitamin, B12 and calcium.    See the Dietician as needed.    Dr. Chiquis Mckeon M.D., MPH  Director of Bariatric & Minimally Invasive Surgery  [place] [address]  T:  [phone]  F:  [phone]     Jacey Smalls, RN Assistant Nurse Manager, Care Coordinator - Bariatric Surgery [facility] Patient Nursing Contact

## 2024-10-16 ENCOUNTER — TELEPHONE (OUTPATIENT)
Dept: SURGERY | Facility: CLINIC | Age: 49
End: 2024-10-16
Payer: COMMERCIAL

## 2024-10-16 LAB
COPPER SERPL-MCNC: 97.1 UG/DL (ref 80–155)
ZINC SERPL-MCNC: 59.6 UG/DL (ref 60–120)

## 2024-10-17 ENCOUNTER — LAB (OUTPATIENT)
Dept: LAB | Facility: LAB | Age: 49
End: 2024-10-17
Payer: COMMERCIAL

## 2024-10-17 DIAGNOSIS — K59.09 OTHER CONSTIPATION: ICD-10-CM

## 2024-10-17 DIAGNOSIS — K92.1 CLOTTED BLOOD IN STOOL: ICD-10-CM

## 2024-10-17 DIAGNOSIS — R10.13 EPIGASTRIC PAIN: ICD-10-CM

## 2024-10-17 LAB
ANNOTATION COMMENT IMP: NORMAL
HEMOCCULT SP1 STL QL: NEGATIVE
RETINYL PALMITATE SERPL-MCNC: <0.02 MG/L (ref 0–0.1)
VIT A SERPL-MCNC: 0.45 MG/L (ref 0.3–1.2)

## 2024-10-17 PROCEDURE — 82270 OCCULT BLOOD FECES: CPT

## 2024-10-18 LAB — VIT B1 PYROPHOSHATE BLD-SCNC: 123 NMOL/L (ref 70–180)

## 2024-10-28 ENCOUNTER — HOSPITAL ENCOUNTER (OUTPATIENT)
Dept: RADIOLOGY | Facility: HOSPITAL | Age: 49
Discharge: HOME | End: 2024-10-28
Payer: COMMERCIAL

## 2024-10-28 ENCOUNTER — OFFICE VISIT (OUTPATIENT)
Dept: HEMATOLOGY/ONCOLOGY | Facility: CLINIC | Age: 49
End: 2024-10-28
Payer: COMMERCIAL

## 2024-10-28 VITALS
TEMPERATURE: 98.2 F | RESPIRATION RATE: 18 BRPM | OXYGEN SATURATION: 98 % | WEIGHT: 170.6 LBS | SYSTOLIC BLOOD PRESSURE: 124 MMHG | HEART RATE: 72 BPM | DIASTOLIC BLOOD PRESSURE: 85 MMHG | BODY MASS INDEX: 29.75 KG/M2

## 2024-10-28 DIAGNOSIS — Z90.3 INTESTINAL MALABSORPTION FOLLOWING GASTRECTOMY (HHS-HCC): ICD-10-CM

## 2024-10-28 DIAGNOSIS — K91.2 INTESTINAL MALABSORPTION FOLLOWING GASTRECTOMY (HHS-HCC): ICD-10-CM

## 2024-10-28 DIAGNOSIS — Z98.84 S/P BARIATRIC SURGERY: ICD-10-CM

## 2024-10-28 DIAGNOSIS — K59.09 OTHER CONSTIPATION: ICD-10-CM

## 2024-10-28 DIAGNOSIS — K92.1 CLOTTED BLOOD IN STOOL: ICD-10-CM

## 2024-10-28 DIAGNOSIS — D50.9 IRON DEFICIENCY ANEMIA, UNSPECIFIED IRON DEFICIENCY ANEMIA TYPE: ICD-10-CM

## 2024-10-28 DIAGNOSIS — R10.13 EPIGASTRIC PAIN: ICD-10-CM

## 2024-10-28 DIAGNOSIS — K90.9 MALABSORPTION OF IRON (HHS-HCC): ICD-10-CM

## 2024-10-28 PROCEDURE — 74177 CT ABD & PELVIS W/CONTRAST: CPT | Performed by: RADIOLOGY

## 2024-10-28 PROCEDURE — 99214 OFFICE O/P EST MOD 30 MIN: CPT | Performed by: PHYSICIAN ASSISTANT

## 2024-10-28 PROCEDURE — 74177 CT ABD & PELVIS W/CONTRAST: CPT

## 2024-10-28 PROCEDURE — 1036F TOBACCO NON-USER: CPT | Performed by: PHYSICIAN ASSISTANT

## 2024-10-28 PROCEDURE — 2550000001 HC RX 255 CONTRASTS: Performed by: SURGERY

## 2024-10-28 ASSESSMENT — PAIN SCALES - GENERAL: PAINLEVEL_OUTOF10: 2

## 2024-10-28 ASSESSMENT — ENCOUNTER SYMPTOMS
OCCASIONAL FEELINGS OF UNSTEADINESS: 0
LOSS OF SENSATION IN FEET: 0
DEPRESSION: 0

## 2024-10-31 ENCOUNTER — ANESTHESIA EVENT (OUTPATIENT)
Dept: OPERATING ROOM | Facility: HOSPITAL | Age: 49
End: 2024-10-31
Payer: COMMERCIAL

## 2024-10-31 RX ORDER — DROPERIDOL 2.5 MG/ML
0.62 INJECTION, SOLUTION INTRAMUSCULAR; INTRAVENOUS ONCE AS NEEDED
Status: CANCELLED | OUTPATIENT
Start: 2024-10-31

## 2024-10-31 RX ORDER — ONDANSETRON HYDROCHLORIDE 2 MG/ML
4 INJECTION, SOLUTION INTRAVENOUS ONCE AS NEEDED
Status: CANCELLED | OUTPATIENT
Start: 2024-10-31

## 2024-11-01 ENCOUNTER — TELEPHONE (OUTPATIENT)
Dept: BARIATRICS | Facility: HOSPITAL | Age: 49
End: 2024-11-01

## 2024-11-01 ENCOUNTER — ANESTHESIA (OUTPATIENT)
Dept: OPERATING ROOM | Facility: HOSPITAL | Age: 49
End: 2024-11-01
Payer: COMMERCIAL

## 2024-11-01 ENCOUNTER — HOSPITAL ENCOUNTER (OUTPATIENT)
Dept: OPERATING ROOM | Facility: HOSPITAL | Age: 49
Setting detail: OUTPATIENT SURGERY
Discharge: HOME | End: 2024-11-01
Payer: COMMERCIAL

## 2024-11-01 VITALS
OXYGEN SATURATION: 99 % | TEMPERATURE: 97.5 F | RESPIRATION RATE: 16 BRPM | HEART RATE: 71 BPM | SYSTOLIC BLOOD PRESSURE: 119 MMHG | DIASTOLIC BLOOD PRESSURE: 64 MMHG | WEIGHT: 167.55 LBS | BODY MASS INDEX: 28.6 KG/M2 | HEIGHT: 64 IN

## 2024-11-01 DIAGNOSIS — R10.13 EPIGASTRIC PAIN: ICD-10-CM

## 2024-11-01 DIAGNOSIS — R11.0 NAUSEA: Primary | ICD-10-CM

## 2024-11-01 DIAGNOSIS — K92.1 CLOTTED BLOOD IN STOOL: ICD-10-CM

## 2024-11-01 DIAGNOSIS — K59.09 OTHER CONSTIPATION: ICD-10-CM

## 2024-11-01 PROCEDURE — 2500000004 HC RX 250 GENERAL PHARMACY W/ HCPCS (ALT 636 FOR OP/ED): Performed by: NURSE ANESTHETIST, CERTIFIED REGISTERED

## 2024-11-01 PROCEDURE — 3600000002 HC OR TIME - INITIAL BASE CHARGE - PROCEDURE LEVEL TWO: Performed by: ANESTHESIOLOGY

## 2024-11-01 PROCEDURE — 45378 DIAGNOSTIC COLONOSCOPY: CPT | Performed by: SURGERY

## 2024-11-01 PROCEDURE — 3700000002 HC GENERAL ANESTHESIA TIME - EACH INCREMENTAL 1 MINUTE: Performed by: ANESTHESIOLOGY

## 2024-11-01 PROCEDURE — 43239 EGD BIOPSY SINGLE/MULTIPLE: CPT | Performed by: SURGERY

## 2024-11-01 PROCEDURE — 3700000001 HC GENERAL ANESTHESIA TIME - INITIAL BASE CHARGE: Performed by: ANESTHESIOLOGY

## 2024-11-01 PROCEDURE — 7100000009 HC PHASE TWO TIME - INITIAL BASE CHARGE: Performed by: ANESTHESIOLOGY

## 2024-11-01 PROCEDURE — 7100000010 HC PHASE TWO TIME - EACH INCREMENTAL 1 MINUTE: Performed by: ANESTHESIOLOGY

## 2024-11-01 PROCEDURE — 3600000007 HC OR TIME - EACH INCREMENTAL 1 MINUTE - PROCEDURE LEVEL TWO: Performed by: ANESTHESIOLOGY

## 2024-11-01 RX ORDER — ONDANSETRON 4 MG/1
4 TABLET, ORALLY DISINTEGRATING ORAL EVERY 8 HOURS PRN
Qty: 30 TABLET | Refills: 0 | Status: SHIPPED | OUTPATIENT
Start: 2024-11-01 | End: 2024-11-11

## 2024-11-01 RX ORDER — MIDAZOLAM HYDROCHLORIDE 1 MG/ML
INJECTION INTRAMUSCULAR; INTRAVENOUS AS NEEDED
Status: DISCONTINUED | OUTPATIENT
Start: 2024-11-01 | End: 2024-11-01

## 2024-11-01 RX ORDER — SODIUM CHLORIDE, SODIUM LACTATE, POTASSIUM CHLORIDE, CALCIUM CHLORIDE 600; 310; 30; 20 MG/100ML; MG/100ML; MG/100ML; MG/100ML
INJECTION, SOLUTION INTRAVENOUS CONTINUOUS PRN
Status: DISCONTINUED | OUTPATIENT
Start: 2024-11-01 | End: 2024-11-01

## 2024-11-01 RX ORDER — PROPOFOL 10 MG/ML
INJECTION, EMULSION INTRAVENOUS AS NEEDED
Status: DISCONTINUED | OUTPATIENT
Start: 2024-11-01 | End: 2024-11-01

## 2024-11-01 RX ORDER — LIDOCAINE HYDROCHLORIDE 10 MG/ML
INJECTION, SOLUTION EPIDURAL; INFILTRATION; INTRACAUDAL; PERINEURAL AS NEEDED
Status: DISCONTINUED | OUTPATIENT
Start: 2024-11-01 | End: 2024-11-01

## 2024-11-01 RX ORDER — FENTANYL CITRATE 50 UG/ML
INJECTION, SOLUTION INTRAMUSCULAR; INTRAVENOUS AS NEEDED
Status: DISCONTINUED | OUTPATIENT
Start: 2024-11-01 | End: 2024-11-01

## 2024-11-01 SDOH — HEALTH STABILITY: MENTAL HEALTH: CURRENT SMOKER: 0

## 2024-11-01 ASSESSMENT — COLUMBIA-SUICIDE SEVERITY RATING SCALE - C-SSRS
1. IN THE PAST MONTH, HAVE YOU WISHED YOU WERE DEAD OR WISHED YOU COULD GO TO SLEEP AND NOT WAKE UP?: NO
2. HAVE YOU ACTUALLY HAD ANY THOUGHTS OF KILLING YOURSELF?: NO
6. HAVE YOU EVER DONE ANYTHING, STARTED TO DO ANYTHING, OR PREPARED TO DO ANYTHING TO END YOUR LIFE?: NO

## 2024-11-06 DIAGNOSIS — K91.2 INTESTINAL MALABSORPTION FOLLOWING GASTRECTOMY (HHS-HCC): ICD-10-CM

## 2024-11-06 DIAGNOSIS — Z98.84 BARIATRIC SURGERY STATUS: Primary | ICD-10-CM

## 2024-11-06 DIAGNOSIS — D50.9 IRON DEFICIENCY ANEMIA, UNSPECIFIED IRON DEFICIENCY ANEMIA TYPE: ICD-10-CM

## 2024-11-06 DIAGNOSIS — K90.9 MALABSORPTION OF IRON (HHS-HCC): ICD-10-CM

## 2024-11-06 DIAGNOSIS — Z90.3 INTESTINAL MALABSORPTION FOLLOWING GASTRECTOMY (HHS-HCC): ICD-10-CM

## 2024-11-06 RX ORDER — ALBUTEROL SULFATE 0.83 MG/ML
3 SOLUTION RESPIRATORY (INHALATION) AS NEEDED
OUTPATIENT
Start: 2024-11-06

## 2024-11-06 RX ORDER — EPINEPHRINE 0.3 MG/.3ML
0.3 INJECTION SUBCUTANEOUS EVERY 5 MIN PRN
OUTPATIENT
Start: 2024-11-06

## 2024-11-06 RX ORDER — FAMOTIDINE 10 MG/ML
20 INJECTION INTRAVENOUS ONCE AS NEEDED
OUTPATIENT
Start: 2024-11-06

## 2024-11-06 RX ORDER — DIPHENHYDRAMINE HYDROCHLORIDE 50 MG/ML
50 INJECTION INTRAMUSCULAR; INTRAVENOUS AS NEEDED
OUTPATIENT
Start: 2024-11-06

## 2024-11-07 ENCOUNTER — TELEPHONE (OUTPATIENT)
Dept: SURGERY | Facility: CLINIC | Age: 49
End: 2024-11-07
Payer: COMMERCIAL

## 2024-11-07 LAB
LABORATORY COMMENT REPORT: NORMAL
PATH REPORT.FINAL DX SPEC: NORMAL
PATH REPORT.GROSS SPEC: NORMAL
PATH REPORT.RELEVANT HX SPEC: NORMAL
PATH REPORT.TOTAL CANCER: NORMAL

## 2024-11-08 ENCOUNTER — APPOINTMENT (OUTPATIENT)
Dept: OPERATING ROOM | Facility: HOSPITAL | Age: 49
End: 2024-11-08
Payer: COMMERCIAL

## 2024-11-11 NOTE — H&P (VIEW-ONLY)
BARIATRIC SURGICAL WEIGHT LOSS   POST DIAGNOTIC TESTING FOLLOW UP VISIT    CLINIC DATE:  11/25/24    NAME: Leyda Blair 49 y.o.  MRN: [id number]    DATE OF SURGERY:  4/2012   SURGICAL PROCEDURE:  Laparoscopic sam en y gastric bypass 97636    EXTRA PROCEDURES:  None      INITIAL WEIGHT:  255  LBS  PRE-SURGICAL WEIGHT:  Unknown  LBS  THIS VISIT WEIGHT / BMI:  173 LBS/ BMI: 30.05  Wt Readings from Last 1 Encounters:   11/25/24 78.5 kg (173 lb)      BMI Readings from Last 1 Encounters:   11/25/24 30.05 kg/m²     WEIGHT / BMI HX:    Wt Readings from Last 3 Encounters:   11/25/24 78.5 kg (173 lb)   11/22/24 80.5 kg (177 lb 7.5 oz)   11/15/24 79 kg (174 lb 3.2 oz)      BMI Readings from Last 3 Encounters:   11/25/24 30.05 kg/m²   11/22/24 30.83 kg/m²   11/15/24 30.26 kg/m²        TOTAL WEIGHT LOSS:  80  LBS      PRESENTING FOR Bariatric Surgical Weight Loss Post Diagnostic Testing Results Review FUV.  This LPN spoke with a 49 y.o. female on *LVM 11/7/24 & 11/12/24 & & 11/18/24 & 11/20/24.  Has completed previously ordered EGD, Colonoscopy, ABD CT.     Had blood in her stool one day last week.  No obvious hemorrhoids.   She notes the constipation is better since last visit.  Not the same as 2 months ago.  She notes some cramping in pelvios and back pain and some pain behind the belly button.  Iton still low and started infusions last week.     CURRENT SYMPTOMS:      Abdominal Pain: Yes       Burping: No         Constipation: No    Diarrhea: No    Dumping Syndrome: No    Hiccups: No    Hunger: Yes    Food Intolerances: No    Nausea: No    Vomiting: No    Weight Regain: No       DIET HISTORY:       Carbonated Beverages: No          Caffeinated Beverages: Yes    Time To Eat Meals 60 Minutes: No    Fluid Intake 60-64 oz/day: Yes    Protein Intake 60 grams/day: Yes    Breakfast: eggs, blueberries, toast    Lunch: turkey, spinach wrap roll    Dinner: chicken, spinach, potato    Snacks: yogurt     GERD - Health Related Quality  of Life Questionnaire (GERD- HRQL)  On PPI: Omeprazole     How bad is the heartburn? 0 = No symptoms  Heartburn when lying down? 0 = No symptoms  Heartburn when standing up? 0 = No symptoms  Heartburn after meals? 0 = No symptoms  Does heartburn change your diet? 0 = No symptoms  Does heartburn wake you from sleep? 0 = No symptoms    Do you have difficulty swallowing? 0 = No symptoms  Do you have pain with swallowing? 0 = No symptoms  If you take medication, does this affect your daily life? 0 = No symptoms    How bad is the regurgitation? 0 = No symptoms  Regurgitation when lying down? 0 = No symptoms  Regurgitation when standing up? 0 = No symptoms  Regurgitation after meals? 0 = No symptoms  Does regurgitation change your diet? 0 = No symptoms  Does regurgitation wake you from sleep? 0 = No symptoms    How satisfied are you with your present condition? Satisfied    Total score (calculated by summing the individual scores of questions 1-15): 0   Greatest possible score 75 (worst symptoms).   Lowest possible score 0 (no symptoms).    Heartburn score (calculated by summing the individual scores of questions 1-6): 0   Worst heartburn symptoms: 30.   No heartburn symptoms: 0.   Score less than or equal to 12 with each individual question not exceeding 2 indicate heartburn elimination.    Regurgitation score (calculated by summing the individual scores of questions 10-15): 0   Worst regurgitation symptoms: 30.   No regurgitation symptoms: 0.   Score less than or equal to 12 with each individual question not exceeding 2 indicate regurgitation elimination.      EXERCISE:  Yes       TEST RESULTS:  Esophagogastroduodenoscopy (EGD)  Order# [id number]  Reading physician: Chiquis Mckeon MD MPH Ordering provider: Chiquis Mckeon MD MPH Study date: 11/1/24     Result Information    Status: Final result (Exam End: 11/1/2024 09:25) Provider Status: Open     Result Text    Result Text   Impression  The esophagus appeared  normal.  Irregular Z-line; performed cold forceps biopsy  The cardia appeared normal.  The gastric pouch appeared normal.  The gastrojejunostomy appeared normal.  The jejunum appeared normal.     Z line 38 cm  Pouch outle at 42 cm  End of candy cane at 46 cm     Findings  The esophagus appeared normal.  Irregular Z-line 38 cm from the incisors; performed cold forceps biopsy  The cardia appeared normal.  The gastric pouch appeared normal.  The gastrojejunostomy appeared normal.  The jejunum appeared normal.     Mildly dilated GJ  No ulceration  No GG fistula  Recommendation  Await pathology results   Follow up with me in clinic   Follow up with me in clinic   Follow the pouch rules:     - - Eat 5 small meals per day (3 meals and 2 snacks)  - Take in at least 60 g protein daily (try to get through lean meats, dairy, legumes)  - Eat 5 vegetables per day  - No sweets, fruits are fine.  Berries and citrus are lower glycemic index fruits  -Limit rice, bread, pasta and potatoes.  These should only be consumed after having your protein and vegetables  - Drink at least 60 oz fluid daily, (non caffeinated, no carbonated beverages, sugar free)  - Get 60 minutes of exercise daily      Continue the PPI  Monitor stools for blood  We may have to consider ulcers in remnant            Indication  Other constipation, Epigastric pain, Clotted blood in stool     Staff  Staff Role   Chiquis Mckeon MD MPH Proceduralist        Procedure Location  [facility] [place]  [address]  [facility] [id number] [phone]     Referring Provider  Chiquis Mckeon MD MPH     Procedure Provider  Chiquis Mckeon MD MPH        Result History    Esophagogastroduodenoscopy (EGD) (Order #[id number]) on 11/1/2024 - Order Result History Report  Procedure Images      Normal; Esophagus   Normal; Esophagus   Normal; Esophagus     Normal; Esophagus    Z-line         Z-line   Normal; Gastrojejunal Anastomosis   Normal;  Gastrojejunal Anastomosis     Normal; Gastric Pouch   Normal; Jejunum; no bile to 60 cm past anastomosis   Normal; Jejunum; no bile         Normal; Jejunum; candy cane   Z-line; Normal   Z-line; Normal     Z-line   Normal           Normal   Normal; Normal; Gastrojejunal Anastomosis; 4 cm candy cane          Anatomical Diagram          LINK Video     Show Video  Related Results     Colonoscopy Screening; Average Risk Patient Final result 11/1/2024 9:25 AM      Narrative   Table formatting from the original result was not included.  Impression  The cecum, ascending colon, hepatic flexure, transverse colon, splenic  flexure, descending colon, rectum and anal region appeared normal.  Diverticulosis in the mid sigmoid colon      Findings  The cecum, ascending colon, hepatic flexure, transverse colon, splenic  flexure, descending colon, rectum and anal region appeared normal. ...           External Procedure Report    Open External Result Report   Order-Level Documents    Endoscopy Anatomical Diagram - Scan on 11/1/24 9:01 AM by EDDIE CIFUENTES of Endoscopy Anatomical Diagram   Endoscopy Image - Scan on 11/1/24 8:56 AM by VANE, EDDIE   Endoscopy Image - Scan on 11/1/24 8:56 AM by VANE, EDDIE   Endoscopy Image - Scan on 11/1/24 8:56 AM by VANE, EDDIE   Endoscopy Image - Scan on 11/1/24 8:56 AM by VANE, EDDIE   Endoscopy Image - Scan on 11/1/24 8:56 AM by VANE, EDDIE   Endoscopy Image - Scan on 11/1/24 8:56 AM by VANE, EDDIE   Endoscopy Image - Scan on 11/1/24 8:56 AM by VANE, EDDIE   Endoscopy Image - Scan on 11/1/24 8:56 AM by VANE, EDDIE   Endoscopy Image - Scan on 11/1/24 8:56 AM by VANE, EDDIE   Endoscopy Image - Scan on 11/1/24 8:56 AM by VANE, EDDIE   Endoscopy Image - Scan on 11/1/24 8:56 AM by VANE, EDDIE   Endoscopy Image - Scan on 11/1/24 8:56 AM by VANE, EDDIE   Endoscopy Image - Scan on 11/1/24 8:56 AM by VANE, EDDIE   Endoscopy Image - Scan on 11/1/24 8:56 AM by VANE,  CHIQUIS   Endoscopy Image - Scan on 11/1/24 8:56 AM by VANE, CHIQUIS   Endoscopy Image - Scan on 11/1/24 8:56 AM by VANE, CHIQUIS   Endoscopy Image - Scan on 11/1/24 8:56 AM by VANE, CHIQUIS   Endoscopy Image - Scan on 11/1/24 8:56 AM by VANE, CHIQUIS   Endoscopy Image - Scan on 11/1/24 8:56 AM by VANE, CHIQUIS   Endoscopy Image - Scan on 11/1/24 8:56 AM by VANE, CHIQUIS   Endoscopy Image - Scan on 11/1/24 8:56 AM by VANE, CHIQUIS   Endoscopy Image - Scan on 11/1/24 8:56 AM by CHIQUIS MCKEON     Surgical Pathology Exam: [id number]  Order: [id number]   Collected 11/1/2024 08:54       Status: Final result       Visible to patient: Yes (seen)       Dx: Other constipation; Epigastric pain; ...    0 Result Notes       Component  Resulting Agency   FINAL DIAGNOSIS   A. ESOPHAGUS DISTAL BIOPSY:   Fragments of gastroesophageal junctional tissue with changes consistent with gastroesophageal reflux disease (GERD)  No intestinal metaplasia, dysplasia or malignancy is identified   Electronically signed by Vernon Lopez MD on 11/7/2024 at 1208      [facility]          Colonoscopy  Order# [id number]  Reading physician: Chiquis Mckeon MD MPH Ordering provider: Chiquis Mckeon MD MPH Study date: 11/1/24     Result Information    Status: Final result (Exam End: 11/1/2024 09:25) Provider Status: Open     Result Text    Result Text   Impression  The cecum, ascending colon, hepatic flexure, transverse colon, splenic flexure, descending colon, rectum and anal region appeared normal.  Diverticulosis in the mid sigmoid colon        Findings  The cecum, ascending colon, hepatic flexure, transverse colon, splenic flexure, descending colon, rectum and anal region appeared normal.  Few diverticula in the mid sigmoid colon        Recommendation  Repeat screening colonoscopy in 10 years, due: 10/30/2034   Continue the PPI  No bleeding diatheses seen  Let's continue to monitor  Follow the pouch rules:     - - Eat 5 small meals per day (3 meals  and 2 snacks)  - Take in at least 60 g protein daily (try to get through lean meats, dairy, legumes)  - Eat 5 vegetables per day  - No sweets, fruits are fine.  Berries and citrus are lower glycemic index fruits  -Limit rice, bread, pasta and potatoes.  These should only be consumed after having your protein and vegetables  - Drink at least 60 oz fluid daily, (non caffeinated, no carbonated beverages, sugar free)  - Get 60 minutes of exercise daily             Indication  Other constipation, Epigastric pain, Clotted blood in stool     Staff  Staff Role   Chiquis Mckeon MD MPH Proceduralist        Specimens  ID Type Source Tests Collected by Time   1 : DISTAL ESOPHAGUS R/O BARRETTS Tissue ESOPHAGUS DISTAL BIOPSY SURGICAL PATHOLOGY EXAM Anai Wooten RN 11/1/2024 0854         Procedure Location  [facility] [place]  [address] [facility] [id number] [phone]     Referring Provider  Chiquis Mckeon MD MPH     Procedure Provider  Chiquis Mckeon MD MPH              Result History    Colonoscopy Screening; Average Risk Patient (Order #[id number]) on 11/1/2024 - Order Result History Report    Procedure Images      Normal; diverticula; Cecum   Normal; Ascending Colon   Normal; Cecum     Normal; Cecum   Normal; Cecum   Normal; Ascending Colon         Normal; Descending Colon    Normal; Rectum     Normal; Rectum       Anatomical Diagram        Related Specimens    Surgical Pathology Exam (Order #[id number]) - Result Report        Order-Level Documents    Endoscopy Anatomical Diagram - Scan on 11/1/24 9:28 AM by CHIQUIS MCKEON of Endoscopy Anatomical Diagram   Endoscopy Image - Scan on 11/1/24 9:24 AM by CHIQUIS MCKEON   Endoscopy Image - Scan on 11/1/24 9:24 AM by CHIQUIS MCKEON   Endoscopy Image - Scan on 11/1/24 9:24 AM by CHIQUIS MCKEON   Endoscopy Image - Scan on 11/1/24 9:24 AM by CHIQUIS MCKEON   Endoscopy Image - Scan on 11/1/24 9:24 AM by CHIQUIS MCKEON   Endoscopy Image - Scan on  11/1/24 9:24 AM by EDDIE CIFUENTES   Endoscopy Image - Scan on 11/1/24 9:24 AM by EDDIE CIFUENTES   Endoscopy Image - Scan on 11/1/24 9:24 AM by EDDIE CIFUENTES   Endoscopy Image - Scan on 11/1/24 9:24 AM by EDDIE CIFUENTES   Endoscopy Image - Scan on 11/1/24 9:24 AM by EDDIE CIFUENTES       CT abdomen pelvis w IV contrast  Status: Final result     PACS Images     Show images for CT abdomen pelvis w IV contrast  Signed by    Signed Time Phone Pager   Ritu Wilson MD 10/30/2024 15:24 [phone]     Exam Information    Status Exam Begun Exam Ended   Final 10/28/2024 13:03 10/28/2024 13:17     Study Result    Narrative & Impression   Interpreted By:  Ritu Wilosn,   STUDY:  CT ABDOMEN PELVIS W IV CONTRAST;  10/28/2024 1:17 pm      INDICATION:  Signs/Symptoms:constipation abdominal pain.      COMPARISON:  09/07/2023      ACCESSION NUMBER(S):  [id number]      ORDERING CLINICIAN:  EDDIE CIFUENTES      TECHNIQUE:  CT of the abdomen and pelvis was performed.  75 mL Omnipaque 350      FINDINGS:  LOWER CHEST:  Images of the lung bases show no infiltrate or pleural fluid.  There is bandlike left lower lobe scarring.      ABDOMEN:      LIVER:  There is no hepatic mass.      BILE DUCTS:  There is no intrahepatic, common hepatic or common bile ductal  dilatation.      GALLBLADDER:  Status post cholecystectomy.      PANCREAS:  The pancreas is unremarkable.      SPLEEN:  The spleen is unremarkable. There is no splenic mass or splenomegaly.      ADRENAL GLANDS:  The adrenal glands are unremarkable.      KIDNEYS AND URETERS:  The kidneys function symmetrically.  The kidneys demonstrate no mass.  There is no intrarenal calculus or hydronephrosis.      BOWEL: Status post gastric bypass. The gastric bypass site is  unremarkable. The entero-entero anastomotic site in the left side of  the abdomen is unremarkable. There is no bowel wall thickening,  dilatation or obstruction. There is a moderate amount of stool  throughout the  colon. The appendix is normal.      VESSELS:  There is a persistent left inferior vena cava.      PERITONEUM/RETROPERITONEUM/LYMPH NODES:  There is no retroperitoneal or pelvic adenopathy.  There is no  ascites.      ABDOMINAL WALL:  The abdominal wall is unremarkable.      BONE AND SOFT TISSUE:  There is no acute osseous finding.      There is no soft tissue abnormality.  Status post hysterectomy.      IMPRESSION:  1. Status post gastric bypass.  2. Constipation.  3. Status post cholecystectomy.  4. Status post hysterectomy.      MACRO:  None      Signed by: Ritu Wilson 10/30/2024 3:24 PM  Dictation workstation:   [id number]     Result History    CT abdomen pelvis w IV contrast (Order #[id number]) on 10/30/2024 - Order Result History Report    Breast Imaging Recommendations  Leyda Blair  No recommendations exist for this order.         External Results Report    Open External Results Report    Encounter    View Encounter      Screening Form Questions    No questions have been answered for this form.     Study Details    Open Study Details       PROVIDER LAST IMPRESSION VISIT NOTE 10/14/25:    49-year-old 12 years post Crissy-en-Y gastric bypass procedure who is done excellent with her weight loss and has maintained a BMI of 29.  Over the last year she has noted worsening constipation anemia and blood clots in her stool.  This is accompanied with some epigastric pain and some lower back pain.  She follows a reasonable diet but she has been noted to have some low protein and is needed to iron infusions in the last year.  She is diffusely tender in the abdomen worst in the midepigastrium.  Overall she is a very compliant patient and denies NSAIDs and smoking.  However her history sounds like there could be an anastomotic ulcer of ulcer versus ulcers in her remnant stomach.  We will switch her and PPI to a capsule that she can open and take every morning.  We will do an upper and lower endoscopy for the blood  in her stool.  She notes she has seen emile clots.  We will resume the fiber for her constipation.  We will check nutrition labs as those has not been checked in a while.  And due to her diffuse tenderness and back pain we will also do a CAT scan for further evaluation to look for any evidence of internal hernias. Counseled heavily on diet and exercise and eating 4-5 times/day with protein and veggies     Plan:  Follow the pouch rules:     - - Eat 5 small meals per day (3 meals and 2 snacks)  - Take in at least 60 g protein daily (try to get through lean meats, dairy, legumes)  - Eat 5 vegetables per day  - No sweets, fruits are fine.  Berries and citrus are lower glycemic index fruits  -Limit rice, bread, pasta and potatoes.  These should only be consumed after having your protein and vegetables  - Drink at least 60 oz fluid daily, (non caffeinated, no carbonated beverages, sugar free)  - Get 60 minutes of exercise daily      Preplan the meals.  We will an upper endoscopy  We will check for blood in stool  Resume metamucil for the constipation  Open the capsule and take in the morning for omeprazole  We should do a colonoscopy  We will do an abdominal CT     Congratulations, you are doing great 12 yrs post-op.       Please have your yearly lab work done (if you have not already) - We will call you with any abnormal lab results.     Be sure to continue with exercise, goal should be 3-5 times a week 60 minutes at a time.     Increase variety and intensity of your work out routine.     Make sure you are taking your multivitamin, B12 and calcium.     See the Dietician as needed.      CURRENT MEDICATIONS:  Current Outpatient Medications   Medication Sig Dispense Refill    buPROPion XL (Wellbutrin XL) 150 mg 24 hr tablet Take 1 tablet (150 mg) by mouth once daily in the morning. 90 tablet 1    CALCIUM CARBONATE-VITAMIN D3 ORAL Take by mouth.      multivitamin capsule Take by mouth.      omeprazole (PriLOSEC) 20 mg   capsule Take 2 capsules (40 mg) by mouth 2 times a day before meals. 120 capsule 11    venlafaxine XR (Effexor-XR) 150 mg 24 hr capsule Take 1 capsule (150 mg) by mouth once daily. 90 capsule 3     No current facility-administered medications for this visit.       PAST MEDICAL HISTORY:    Past Medical History:   Diagnosis Date    Abnormal weight loss 2016    Rapid weight loss    Bariatric surgery status     Bilateral impacted cerumen 2024    Clotted blood in stool     Constipation     Epigastric pain     Herpes zoster without complication 2024    Perianal venous thrombosis 2016    Thrombosed external hemorrhoids    Personal history of diseases of the blood and blood-forming organs and certain disorders involving the immune mechanism 2018    History of anemia    Personal history of other specified conditions 02/15/2019    History of fatigue    Personal history of other specified conditions 2016    History of nausea and vomiting    Right upper quadrant pain 2016    Abdominal wall pain in right upper quadrant    Spontaneous  2024    Unspecified contact dermatitis due to plants, except food 2017    Rhus dermatitis        PAST SURGICAL HISTORY:  Past Surgical History:   Procedure Laterality Date    GASTRIC BYPASS  2013    Gastric Surgery For Morbid Obesity Bypass With Crissy-en-Y    HYSTERECTOMY  02/15/2019       FAMILY HISTORY:    No family history on file.     SOCIAL HISTORY:    Social History     Tobacco Use    Smoking status: Never     Passive exposure: Never    Smokeless tobacco: Never   Substance Use Topics    Alcohol use: Not Currently    Drug use: Never       ALLERGIES:    No Known Allergies    REVIEW OF SYSTEMS:  GENERAL: Negative for malaise, significant weight loss and fever  NECK: Negative for lumps, goiter, pain and significant neck swelling  RESPIRATORY: Negative for cough, wheezing or shortness of breath.  CARDIOVASCULAR: Negative for chest  pain, leg swelling or palpitations.  GI: Negative for abdominal discomfort, blood in stools or black stools or change in bowel habits  : No history of dysuria, frequency or incontinence  MUSCULOSKELETAL: Negative for joint pain or swelling, back pain or muscle pain.  SKIN: Negative for lesions, rash, and itching.  PSYCH: Negative for sleep disturbance, mood disorder and recent psychosocial stressors.  ENDOCRINE: Negative for cold or heat intolerance, polyuria, polydipsia and goiter.    PHYSICAL EXAM  Visit Vitals  /89   Pulse 72     General appearance: obese  Skin: warm, no erythema or rashes  Lungs: clear to percussion and auscultation  Heart: regular rhythm and S1, S2 normal  Abdomen: upper abdominal tenderness, worse on right than left, no rebound or guarding.   Extremities: Normal exam of the extremities. No swelling or pain.    IMPRESSION:  Leyda Blair is a 49 y.o. female with abdominal pain post rygbp that is unexplained post rygbp.   S49-year-old 12 years post Crissy-en-Y gastric bypass procedure who is done excellent with her weight loss and has maintained a BMI of 29.  Over the last year she has noted worsening constipation anemia and blood clots in her stool.  This is accompanied with some epigastric pain and some lower back pain.  She follows a reasonable diet but she has been noted to have some low protein and is needed to iron infusions in the last year.  She is diffusely tender in the abdomen worst in the midepigastrium.  Overall she is a very compliant patient and denies NSAIDs and smoking.S  She has completed her workup and her endoscopy showed no bleeding diatheses.  Her CT scan shows no clear abnormalities and no obvious internal hernias.  All of the studies were reviewed by me and discussed with the patient in detail.  At this point knowing that she has unexplained after pain after a gastric bypass procedure is recommended that she undergo a diagnostic laparoscopy to ensure that there  is no internal hernia that was not clear on other imaging.  She notes she is only passed clots 1 time since her last visit and that it has gone down.  she notes her constipation is improved. She remains tender on exam. She continues to have unexplained pain.  Although she has more pain in her lower abdomen she is clearly tender in her upper abdomen and no ulceration was noted.  We discussed that we should take a look to see if we can find any other causes of her pain.  The patient is agreeable to this plan    She notes the omeprazole and opening the capsule has helped with pain at bedtime  Fiber has helped the colon.  No bleeding diatheses seen on scopes.   We discussed that she could have an ulcer in the remnant bleeding but only had one episode of blood in stools since last visit.  She notes it was bright red blood in the stools.  We discussed this can be hemorrhoids. We discussed she had some diverticuli and also could be bleeding there intermittently.  We have no clear reason for her anemia other than poor absorption.       PLAN:  Avoid popcorn, nuts and seeds  Stay on high fiber diet  See GYN about the lower abdominal pain  We will plan for a diagnostic laparoscopy  Start vitamin C daily to help iron absorption.       Dr. Chiquis Mckeon M.D., MPH  Director of Bariatric & Minimally Invasive Surgery  [place] [address]  T:  [phone]  F:  [phone]     Jacey Smalls, RN Assistant Nurse Manager, Care Coordinator - Bariatric/General Surgery [facility] Patient Nursing Contact  T:  [phone]  F:  [phone]

## 2024-11-11 NOTE — PROGRESS NOTES
BARIATRIC SURGICAL WEIGHT LOSS   POST DIAGNOTIC TESTING FOLLOW UP VISIT    CLINIC DATE:  11/25/24    NAME: Leyda Blair 49 y.o.  MRN: [id number]    DATE OF SURGERY:  4/2012   SURGICAL PROCEDURE:  Laparoscopic sam en y gastric bypass 90380    EXTRA PROCEDURES:  None      INITIAL WEIGHT:  255  LBS  PRE-SURGICAL WEIGHT:  Unknown  LBS  THIS VISIT WEIGHT / BMI:  173 LBS/ BMI: 30.05  Wt Readings from Last 1 Encounters:   11/25/24 78.5 kg (173 lb)      BMI Readings from Last 1 Encounters:   11/25/24 30.05 kg/m²     WEIGHT / BMI HX:    Wt Readings from Last 3 Encounters:   11/25/24 78.5 kg (173 lb)   11/22/24 80.5 kg (177 lb 7.5 oz)   11/15/24 79 kg (174 lb 3.2 oz)      BMI Readings from Last 3 Encounters:   11/25/24 30.05 kg/m²   11/22/24 30.83 kg/m²   11/15/24 30.26 kg/m²        TOTAL WEIGHT LOSS:  80  LBS      PRESENTING FOR Bariatric Surgical Weight Loss Post Diagnostic Testing Results Review FUV.  This LPN spoke with a 49 y.o. female on *LVM 11/7/24 & 11/12/24 & & 11/18/24 & 11/20/24.  Has completed previously ordered EGD, Colonoscopy, ABD CT.     Had blood in her stool one day last week.  No obvious hemorrhoids.   She notes the constipation is better since last visit.  Not the same as 2 months ago.  She notes some cramping in pelvios and back pain and some pain behind the belly button.  Iton still low and started infusions last week.     CURRENT SYMPTOMS:      Abdominal Pain: Yes       Burping: No         Constipation: No    Diarrhea: No    Dumping Syndrome: No    Hiccups: No    Hunger: Yes    Food Intolerances: No    Nausea: No    Vomiting: No    Weight Regain: No       DIET HISTORY:       Carbonated Beverages: No          Caffeinated Beverages: Yes    Time To Eat Meals 60 Minutes: No    Fluid Intake 60-64 oz/day: Yes    Protein Intake 60 grams/day: Yes    Breakfast: eggs, blueberries, toast    Lunch: turkey, spinach wrap roll    Dinner: chicken, spinach, potato    Snacks: yogurt     GERD - Health Related Quality  of Life Questionnaire (GERD- HRQL)  On PPI: Omeprazole     How bad is the heartburn? 0 = No symptoms  Heartburn when lying down? 0 = No symptoms  Heartburn when standing up? 0 = No symptoms  Heartburn after meals? 0 = No symptoms  Does heartburn change your diet? 0 = No symptoms  Does heartburn wake you from sleep? 0 = No symptoms    Do you have difficulty swallowing? 0 = No symptoms  Do you have pain with swallowing? 0 = No symptoms  If you take medication, does this affect your daily life? 0 = No symptoms    How bad is the regurgitation? 0 = No symptoms  Regurgitation when lying down? 0 = No symptoms  Regurgitation when standing up? 0 = No symptoms  Regurgitation after meals? 0 = No symptoms  Does regurgitation change your diet? 0 = No symptoms  Does regurgitation wake you from sleep? 0 = No symptoms    How satisfied are you with your present condition? Satisfied    Total score (calculated by summing the individual scores of questions 1-15): 0   Greatest possible score 75 (worst symptoms).   Lowest possible score 0 (no symptoms).    Heartburn score (calculated by summing the individual scores of questions 1-6): 0   Worst heartburn symptoms: 30.   No heartburn symptoms: 0.   Score less than or equal to 12 with each individual question not exceeding 2 indicate heartburn elimination.    Regurgitation score (calculated by summing the individual scores of questions 10-15): 0   Worst regurgitation symptoms: 30.   No regurgitation symptoms: 0.   Score less than or equal to 12 with each individual question not exceeding 2 indicate regurgitation elimination.      EXERCISE:  Yes       TEST RESULTS:  Esophagogastroduodenoscopy (EGD)  Order# [id number]  Reading physician: Chiquis Mckeon MD MPH Ordering provider: Chiquis Mckeon MD MPH Study date: 11/1/24     Result Information    Status: Final result (Exam End: 11/1/2024 09:25) Provider Status: Open     Result Text    Result Text   Impression  The esophagus appeared  normal.  Irregular Z-line; performed cold forceps biopsy  The cardia appeared normal.  The gastric pouch appeared normal.  The gastrojejunostomy appeared normal.  The jejunum appeared normal.     Z line 38 cm  Pouch outle at 42 cm  End of candy cane at 46 cm     Findings  The esophagus appeared normal.  Irregular Z-line 38 cm from the incisors; performed cold forceps biopsy  The cardia appeared normal.  The gastric pouch appeared normal.  The gastrojejunostomy appeared normal.  The jejunum appeared normal.     Mildly dilated GJ  No ulceration  No GG fistula  Recommendation  Await pathology results   Follow up with me in clinic   Follow up with me in clinic   Follow the pouch rules:     - - Eat 5 small meals per day (3 meals and 2 snacks)  - Take in at least 60 g protein daily (try to get through lean meats, dairy, legumes)  - Eat 5 vegetables per day  - No sweets, fruits are fine.  Berries and citrus are lower glycemic index fruits  -Limit rice, bread, pasta and potatoes.  These should only be consumed after having your protein and vegetables  - Drink at least 60 oz fluid daily, (non caffeinated, no carbonated beverages, sugar free)  - Get 60 minutes of exercise daily      Continue the PPI  Monitor stools for blood  We may have to consider ulcers in remnant            Indication  Other constipation, Epigastric pain, Clotted blood in stool     Staff  Staff Role   Chiquis Mckeon MD MPH Proceduralist        Procedure Location  [facility] [place]  [address]  [facility] [id number] [phone]     Referring Provider  Chiquis Mckeon MD MPH     Procedure Provider  Chiquis Mckeon MD MPH        Result History    Esophagogastroduodenoscopy (EGD) (Order #[id number]) on 11/1/2024 - Order Result History Report  Procedure Images      Normal; Esophagus   Normal; Esophagus   Normal; Esophagus     Normal; Esophagus    Z-line         Z-line   Normal; Gastrojejunal Anastomosis   Normal;  Gastrojejunal Anastomosis     Normal; Gastric Pouch   Normal; Jejunum; no bile to 60 cm past anastomosis   Normal; Jejunum; no bile         Normal; Jejunum; candy cane   Z-line; Normal   Z-line; Normal     Z-line   Normal           Normal   Normal; Normal; Gastrojejunal Anastomosis; 4 cm candy cane          Anatomical Diagram          LINK Video     Show Video  Related Results     Colonoscopy Screening; Average Risk Patient Final result 11/1/2024 9:25 AM      Narrative   Table formatting from the original result was not included.  Impression  The cecum, ascending colon, hepatic flexure, transverse colon, splenic  flexure, descending colon, rectum and anal region appeared normal.  Diverticulosis in the mid sigmoid colon      Findings  The cecum, ascending colon, hepatic flexure, transverse colon, splenic  flexure, descending colon, rectum and anal region appeared normal. ...           External Procedure Report    Open External Result Report   Order-Level Documents    Endoscopy Anatomical Diagram - Scan on 11/1/24 9:01 AM by EDDIE CIFUENTES of Endoscopy Anatomical Diagram   Endoscopy Image - Scan on 11/1/24 8:56 AM by VANE, EDDIE   Endoscopy Image - Scan on 11/1/24 8:56 AM by VANE, EDDIE   Endoscopy Image - Scan on 11/1/24 8:56 AM by VANE, EDDIE   Endoscopy Image - Scan on 11/1/24 8:56 AM by VANE, EDDIE   Endoscopy Image - Scan on 11/1/24 8:56 AM by VANE, EDDIE   Endoscopy Image - Scan on 11/1/24 8:56 AM by VANE, EDDIE   Endoscopy Image - Scan on 11/1/24 8:56 AM by VANE, EDDIE   Endoscopy Image - Scan on 11/1/24 8:56 AM by VANE, EDDIE   Endoscopy Image - Scan on 11/1/24 8:56 AM by VANE, EDDIE   Endoscopy Image - Scan on 11/1/24 8:56 AM by VANE, EDDIE   Endoscopy Image - Scan on 11/1/24 8:56 AM by VANE, EDDIE   Endoscopy Image - Scan on 11/1/24 8:56 AM by VANE, EDDIE   Endoscopy Image - Scan on 11/1/24 8:56 AM by VANE, EDDIE   Endoscopy Image - Scan on 11/1/24 8:56 AM by VANE,  CHIQUIS   Endoscopy Image - Scan on 11/1/24 8:56 AM by VANE, CHIQUIS   Endoscopy Image - Scan on 11/1/24 8:56 AM by VANE, CHIQUIS   Endoscopy Image - Scan on 11/1/24 8:56 AM by VANE, CHIQUIS   Endoscopy Image - Scan on 11/1/24 8:56 AM by VANE, CHIQUIS   Endoscopy Image - Scan on 11/1/24 8:56 AM by VANE, CHIQUIS   Endoscopy Image - Scan on 11/1/24 8:56 AM by VANE, CHIQUIS   Endoscopy Image - Scan on 11/1/24 8:56 AM by VANE, CHIQUIS   Endoscopy Image - Scan on 11/1/24 8:56 AM by CHIQUIS MCKEON     Surgical Pathology Exam: [id number]  Order: [id number]   Collected 11/1/2024 08:54       Status: Final result       Visible to patient: Yes (seen)       Dx: Other constipation; Epigastric pain; ...    0 Result Notes       Component  Resulting Agency   FINAL DIAGNOSIS   A. ESOPHAGUS DISTAL BIOPSY:   Fragments of gastroesophageal junctional tissue with changes consistent with gastroesophageal reflux disease (GERD)  No intestinal metaplasia, dysplasia or malignancy is identified   Electronically signed by Vernon Lopez MD on 11/7/2024 at 1208      [facility]          Colonoscopy  Order# [id number]  Reading physician: Chiquis Mckeon MD MPH Ordering provider: Chiquis Mckeon MD MPH Study date: 11/1/24     Result Information    Status: Final result (Exam End: 11/1/2024 09:25) Provider Status: Open     Result Text    Result Text   Impression  The cecum, ascending colon, hepatic flexure, transverse colon, splenic flexure, descending colon, rectum and anal region appeared normal.  Diverticulosis in the mid sigmoid colon        Findings  The cecum, ascending colon, hepatic flexure, transverse colon, splenic flexure, descending colon, rectum and anal region appeared normal.  Few diverticula in the mid sigmoid colon        Recommendation  Repeat screening colonoscopy in 10 years, due: 10/30/2034   Continue the PPI  No bleeding diatheses seen  Let's continue to monitor  Follow the pouch rules:     - - Eat 5 small meals per day (3 meals  and 2 snacks)  - Take in at least 60 g protein daily (try to get through lean meats, dairy, legumes)  - Eat 5 vegetables per day  - No sweets, fruits are fine.  Berries and citrus are lower glycemic index fruits  -Limit rice, bread, pasta and potatoes.  These should only be consumed after having your protein and vegetables  - Drink at least 60 oz fluid daily, (non caffeinated, no carbonated beverages, sugar free)  - Get 60 minutes of exercise daily             Indication  Other constipation, Epigastric pain, Clotted blood in stool     Staff  Staff Role   Chiquis Mckeon MD MPH Proceduralist        Specimens  ID Type Source Tests Collected by Time   1 : DISTAL ESOPHAGUS R/O BARRETTS Tissue ESOPHAGUS DISTAL BIOPSY SURGICAL PATHOLOGY EXAM Anai Wooten RN 11/1/2024 0854         Procedure Location  [facility] [place]  [address] [facility] [id number] [phone]     Referring Provider  Chiquis Mckeon MD MPH     Procedure Provider  Chiquis Mckeon MD MPH              Result History    Colonoscopy Screening; Average Risk Patient (Order #[id number]) on 11/1/2024 - Order Result History Report    Procedure Images      Normal; diverticula; Cecum   Normal; Ascending Colon   Normal; Cecum     Normal; Cecum   Normal; Cecum   Normal; Ascending Colon         Normal; Descending Colon    Normal; Rectum     Normal; Rectum       Anatomical Diagram        Related Specimens    Surgical Pathology Exam (Order #[id number]) - Result Report        Order-Level Documents    Endoscopy Anatomical Diagram - Scan on 11/1/24 9:28 AM by CHIQUIS MCKEON of Endoscopy Anatomical Diagram   Endoscopy Image - Scan on 11/1/24 9:24 AM by CHIQUIS MCKEON   Endoscopy Image - Scan on 11/1/24 9:24 AM by CHIQUIS MCKEON   Endoscopy Image - Scan on 11/1/24 9:24 AM by CHIQUIS MCKEON   Endoscopy Image - Scan on 11/1/24 9:24 AM by CHIQUIS MCKEON   Endoscopy Image - Scan on 11/1/24 9:24 AM by CHIQUIS MCKEON   Endoscopy Image - Scan on  11/1/24 9:24 AM by EDDIE CIFUENTES   Endoscopy Image - Scan on 11/1/24 9:24 AM by EDDIE CIFUENTES   Endoscopy Image - Scan on 11/1/24 9:24 AM by EDDIE CIFUENTES   Endoscopy Image - Scan on 11/1/24 9:24 AM by EDDIE CIFUENTES   Endoscopy Image - Scan on 11/1/24 9:24 AM by EDDIE CIFUENTES       CT abdomen pelvis w IV contrast  Status: Final result     PACS Images     Show images for CT abdomen pelvis w IV contrast  Signed by    Signed Time Phone Pager   Ritu Wilson MD 10/30/2024 15:24 [phone]     Exam Information    Status Exam Begun Exam Ended   Final 10/28/2024 13:03 10/28/2024 13:17     Study Result    Narrative & Impression   Interpreted By:  Ritu Wilson,   STUDY:  CT ABDOMEN PELVIS W IV CONTRAST;  10/28/2024 1:17 pm      INDICATION:  Signs/Symptoms:constipation abdominal pain.      COMPARISON:  09/07/2023      ACCESSION NUMBER(S):  [id number]      ORDERING CLINICIAN:  EDDIE CIFUENTES      TECHNIQUE:  CT of the abdomen and pelvis was performed.  75 mL Omnipaque 350      FINDINGS:  LOWER CHEST:  Images of the lung bases show no infiltrate or pleural fluid.  There is bandlike left lower lobe scarring.      ABDOMEN:      LIVER:  There is no hepatic mass.      BILE DUCTS:  There is no intrahepatic, common hepatic or common bile ductal  dilatation.      GALLBLADDER:  Status post cholecystectomy.      PANCREAS:  The pancreas is unremarkable.      SPLEEN:  The spleen is unremarkable. There is no splenic mass or splenomegaly.      ADRENAL GLANDS:  The adrenal glands are unremarkable.      KIDNEYS AND URETERS:  The kidneys function symmetrically.  The kidneys demonstrate no mass.  There is no intrarenal calculus or hydronephrosis.      BOWEL: Status post gastric bypass. The gastric bypass site is  unremarkable. The entero-entero anastomotic site in the left side of  the abdomen is unremarkable. There is no bowel wall thickening,  dilatation or obstruction. There is a moderate amount of stool  throughout the  colon. The appendix is normal.      VESSELS:  There is a persistent left inferior vena cava.      PERITONEUM/RETROPERITONEUM/LYMPH NODES:  There is no retroperitoneal or pelvic adenopathy.  There is no  ascites.      ABDOMINAL WALL:  The abdominal wall is unremarkable.      BONE AND SOFT TISSUE:  There is no acute osseous finding.      There is no soft tissue abnormality.  Status post hysterectomy.      IMPRESSION:  1. Status post gastric bypass.  2. Constipation.  3. Status post cholecystectomy.  4. Status post hysterectomy.      MACRO:  None      Signed by: Ritu Wilson 10/30/2024 3:24 PM  Dictation workstation:   [id number]     Result History    CT abdomen pelvis w IV contrast (Order #[id number]) on 10/30/2024 - Order Result History Report    Breast Imaging Recommendations  Leyda Blair  No recommendations exist for this order.         External Results Report    Open External Results Report    Encounter    View Encounter      Screening Form Questions    No questions have been answered for this form.     Study Details    Open Study Details       PROVIDER LAST IMPRESSION VISIT NOTE 10/14/25:    49-year-old 12 years post Crissy-en-Y gastric bypass procedure who is done excellent with her weight loss and has maintained a BMI of 29.  Over the last year she has noted worsening constipation anemia and blood clots in her stool.  This is accompanied with some epigastric pain and some lower back pain.  She follows a reasonable diet but she has been noted to have some low protein and is needed to iron infusions in the last year.  She is diffusely tender in the abdomen worst in the midepigastrium.  Overall she is a very compliant patient and denies NSAIDs and smoking.  However her history sounds like there could be an anastomotic ulcer of ulcer versus ulcers in her remnant stomach.  We will switch her and PPI to a capsule that she can open and take every morning.  We will do an upper and lower endoscopy for the blood  in her stool.  She notes she has seen emile clots.  We will resume the fiber for her constipation.  We will check nutrition labs as those has not been checked in a while.  And due to her diffuse tenderness and back pain we will also do a CAT scan for further evaluation to look for any evidence of internal hernias. Counseled heavily on diet and exercise and eating 4-5 times/day with protein and veggies     Plan:  Follow the pouch rules:     - - Eat 5 small meals per day (3 meals and 2 snacks)  - Take in at least 60 g protein daily (try to get through lean meats, dairy, legumes)  - Eat 5 vegetables per day  - No sweets, fruits are fine.  Berries and citrus are lower glycemic index fruits  -Limit rice, bread, pasta and potatoes.  These should only be consumed after having your protein and vegetables  - Drink at least 60 oz fluid daily, (non caffeinated, no carbonated beverages, sugar free)  - Get 60 minutes of exercise daily      Preplan the meals.  We will an upper endoscopy  We will check for blood in stool  Resume metamucil for the constipation  Open the capsule and take in the morning for omeprazole  We should do a colonoscopy  We will do an abdominal CT     Congratulations, you are doing great 12 yrs post-op.       Please have your yearly lab work done (if you have not already) - We will call you with any abnormal lab results.     Be sure to continue with exercise, goal should be 3-5 times a week 60 minutes at a time.     Increase variety and intensity of your work out routine.     Make sure you are taking your multivitamin, B12 and calcium.     See the Dietician as needed.      CURRENT MEDICATIONS:  Current Outpatient Medications   Medication Sig Dispense Refill    buPROPion XL (Wellbutrin XL) 150 mg 24 hr tablet Take 1 tablet (150 mg) by mouth once daily in the morning. 90 tablet 1    CALCIUM CARBONATE-VITAMIN D3 ORAL Take by mouth.      multivitamin capsule Take by mouth.      omeprazole (PriLOSEC) 20 mg   capsule Take 2 capsules (40 mg) by mouth 2 times a day before meals. 120 capsule 11    venlafaxine XR (Effexor-XR) 150 mg 24 hr capsule Take 1 capsule (150 mg) by mouth once daily. 90 capsule 3     No current facility-administered medications for this visit.       PAST MEDICAL HISTORY:    Past Medical History:   Diagnosis Date    Abnormal weight loss 2016    Rapid weight loss    Bariatric surgery status     Bilateral impacted cerumen 2024    Clotted blood in stool     Constipation     Epigastric pain     Herpes zoster without complication 2024    Perianal venous thrombosis 2016    Thrombosed external hemorrhoids    Personal history of diseases of the blood and blood-forming organs and certain disorders involving the immune mechanism 2018    History of anemia    Personal history of other specified conditions 02/15/2019    History of fatigue    Personal history of other specified conditions 2016    History of nausea and vomiting    Right upper quadrant pain 2016    Abdominal wall pain in right upper quadrant    Spontaneous  2024    Unspecified contact dermatitis due to plants, except food 2017    Rhus dermatitis        PAST SURGICAL HISTORY:  Past Surgical History:   Procedure Laterality Date    GASTRIC BYPASS  2013    Gastric Surgery For Morbid Obesity Bypass With Crissy-en-Y    HYSTERECTOMY  02/15/2019       FAMILY HISTORY:    No family history on file.     SOCIAL HISTORY:    Social History     Tobacco Use    Smoking status: Never     Passive exposure: Never    Smokeless tobacco: Never   Substance Use Topics    Alcohol use: Not Currently    Drug use: Never       ALLERGIES:    No Known Allergies    REVIEW OF SYSTEMS:  GENERAL: Negative for malaise, significant weight loss and fever  NECK: Negative for lumps, goiter, pain and significant neck swelling  RESPIRATORY: Negative for cough, wheezing or shortness of breath.  CARDIOVASCULAR: Negative for chest  pain, leg swelling or palpitations.  GI: Negative for abdominal discomfort, blood in stools or black stools or change in bowel habits  : No history of dysuria, frequency or incontinence  MUSCULOSKELETAL: Negative for joint pain or swelling, back pain or muscle pain.  SKIN: Negative for lesions, rash, and itching.  PSYCH: Negative for sleep disturbance, mood disorder and recent psychosocial stressors.  ENDOCRINE: Negative for cold or heat intolerance, polyuria, polydipsia and goiter.    PHYSICAL EXAM  Visit Vitals  /89   Pulse 72     General appearance: obese  Skin: warm, no erythema or rashes  Lungs: clear to percussion and auscultation  Heart: regular rhythm and S1, S2 normal  Abdomen: upper abdominal tenderness, worse on right than left, no rebound or guarding.   Extremities: Normal exam of the extremities. No swelling or pain.    IMPRESSION:  Leyda Blair is a 49 y.o. female with abdominal pain post rygbp that is unexplained post rygbp.   S49-year-old 12 years post Crissy-en-Y gastric bypass procedure who is done excellent with her weight loss and has maintained a BMI of 29.  Over the last year she has noted worsening constipation anemia and blood clots in her stool.  This is accompanied with some epigastric pain and some lower back pain.  She follows a reasonable diet but she has been noted to have some low protein and is needed to iron infusions in the last year.  She is diffusely tender in the abdomen worst in the midepigastrium.  Overall she is a very compliant patient and denies NSAIDs and smoking.S  She has completed her workup and her endoscopy showed no bleeding diatheses.  Her CT scan shows no clear abnormalities and no obvious internal hernias.  All of the studies were reviewed by me and discussed with the patient in detail.  At this point knowing that she has unexplained after pain after a gastric bypass procedure is recommended that she undergo a diagnostic laparoscopy to ensure that there  is no internal hernia that was not clear on other imaging.  She notes she is only passed clots 1 time since her last visit and that it has gone down.  she notes her constipation is improved. She remains tender on exam. She continues to have unexplained pain.  Although she has more pain in her lower abdomen she is clearly tender in her upper abdomen and no ulceration was noted.  We discussed that we should take a look to see if we can find any other causes of her pain.  The patient is agreeable to this plan    She notes the omeprazole and opening the capsule has helped with pain at bedtime  Fiber has helped the colon.  No bleeding diatheses seen on scopes.   We discussed that she could have an ulcer in the remnant bleeding but only had one episode of blood in stools since last visit.  She notes it was bright red blood in the stools.  We discussed this can be hemorrhoids. We discussed she had some diverticuli and also could be bleeding there intermittently.  We have no clear reason for her anemia other than poor absorption.       PLAN:  Avoid popcorn, nuts and seeds  Stay on high fiber diet  See GYN about the lower abdominal pain  We will plan for a diagnostic laparoscopy  Start vitamin C daily to help iron absorption.       Dr. Chiquis Mckeon M.D., MPH  Director of Bariatric & Minimally Invasive Surgery  [place] [address]  T:  [phone]  F:  [phone]     aJcey Smalls, RN Assistant Nurse Manager, Care Coordinator - Bariatric/General Surgery [facility] Patient Nursing Contact  T:  [phone]  F:  [phone]

## 2024-11-12 ENCOUNTER — TELEPHONE (OUTPATIENT)
Dept: SURGERY | Facility: CLINIC | Age: 49
End: 2024-11-12
Payer: COMMERCIAL

## 2024-11-15 ENCOUNTER — INFUSION (OUTPATIENT)
Dept: HEMATOLOGY/ONCOLOGY | Facility: CLINIC | Age: 49
End: 2024-11-15
Payer: COMMERCIAL

## 2024-11-15 VITALS
OXYGEN SATURATION: 97 % | WEIGHT: 174.2 LBS | DIASTOLIC BLOOD PRESSURE: 77 MMHG | HEART RATE: 73 BPM | SYSTOLIC BLOOD PRESSURE: 114 MMHG | TEMPERATURE: 98.2 F | RESPIRATION RATE: 18 BRPM | BODY MASS INDEX: 29.74 KG/M2 | HEIGHT: 64 IN

## 2024-11-15 DIAGNOSIS — Z90.3 INTESTINAL MALABSORPTION FOLLOWING GASTRECTOMY (HHS-HCC): ICD-10-CM

## 2024-11-15 DIAGNOSIS — Z98.84 BARIATRIC SURGERY STATUS: ICD-10-CM

## 2024-11-15 DIAGNOSIS — D50.9 IRON DEFICIENCY ANEMIA, UNSPECIFIED IRON DEFICIENCY ANEMIA TYPE: ICD-10-CM

## 2024-11-15 DIAGNOSIS — D50.8 OTHER IRON DEFICIENCY ANEMIA: ICD-10-CM

## 2024-11-15 DIAGNOSIS — K91.2 INTESTINAL MALABSORPTION FOLLOWING GASTRECTOMY (HHS-HCC): ICD-10-CM

## 2024-11-15 DIAGNOSIS — K90.9 MALABSORPTION OF IRON (HHS-HCC): ICD-10-CM

## 2024-11-15 PROCEDURE — 2500000004 HC RX 250 GENERAL PHARMACY W/ HCPCS (ALT 636 FOR OP/ED): Performed by: PHYSICIAN ASSISTANT

## 2024-11-15 PROCEDURE — 96374 THER/PROPH/DIAG INJ IV PUSH: CPT | Mod: INF

## 2024-11-15 RX ORDER — HEPARIN SODIUM,PORCINE/PF 10 UNIT/ML
50 SYRINGE (ML) INTRAVENOUS AS NEEDED
OUTPATIENT
Start: 2024-11-15

## 2024-11-15 RX ORDER — FAMOTIDINE 10 MG/ML
20 INJECTION INTRAVENOUS ONCE AS NEEDED
Status: DISCONTINUED | OUTPATIENT
Start: 2024-11-15 | End: 2024-11-15 | Stop reason: HOSPADM

## 2024-11-15 RX ORDER — ALBUTEROL SULFATE 0.83 MG/ML
3 SOLUTION RESPIRATORY (INHALATION) AS NEEDED
Status: DISCONTINUED | OUTPATIENT
Start: 2024-11-15 | End: 2024-11-15 | Stop reason: HOSPADM

## 2024-11-15 RX ORDER — ALBUTEROL SULFATE 0.83 MG/ML
3 SOLUTION RESPIRATORY (INHALATION) AS NEEDED
OUTPATIENT
Start: 2024-11-30

## 2024-11-15 RX ORDER — HEPARIN 100 UNIT/ML
500 SYRINGE INTRAVENOUS AS NEEDED
Status: DISCONTINUED | OUTPATIENT
Start: 2024-11-15 | End: 2024-11-15 | Stop reason: HOSPADM

## 2024-11-15 RX ORDER — FAMOTIDINE 10 MG/ML
20 INJECTION INTRAVENOUS ONCE AS NEEDED
OUTPATIENT
Start: 2024-11-30

## 2024-11-15 RX ORDER — EPINEPHRINE 0.3 MG/.3ML
0.3 INJECTION SUBCUTANEOUS EVERY 5 MIN PRN
OUTPATIENT
Start: 2024-11-30

## 2024-11-15 RX ORDER — DIPHENHYDRAMINE HYDROCHLORIDE 50 MG/ML
50 INJECTION INTRAMUSCULAR; INTRAVENOUS AS NEEDED
OUTPATIENT
Start: 2024-11-30

## 2024-11-15 RX ORDER — DIPHENHYDRAMINE HYDROCHLORIDE 50 MG/ML
50 INJECTION INTRAMUSCULAR; INTRAVENOUS AS NEEDED
Status: DISCONTINUED | OUTPATIENT
Start: 2024-11-15 | End: 2024-11-15 | Stop reason: HOSPADM

## 2024-11-15 RX ORDER — HEPARIN SODIUM,PORCINE/PF 10 UNIT/ML
50 SYRINGE (ML) INTRAVENOUS AS NEEDED
Status: DISCONTINUED | OUTPATIENT
Start: 2024-11-15 | End: 2024-11-15 | Stop reason: HOSPADM

## 2024-11-15 RX ORDER — EPINEPHRINE 0.3 MG/.3ML
0.3 INJECTION SUBCUTANEOUS EVERY 5 MIN PRN
Status: DISCONTINUED | OUTPATIENT
Start: 2024-11-15 | End: 2024-11-15 | Stop reason: HOSPADM

## 2024-11-15 RX ORDER — HEPARIN 100 UNIT/ML
500 SYRINGE INTRAVENOUS AS NEEDED
OUTPATIENT
Start: 2024-11-15

## 2024-11-15 ASSESSMENT — PAIN SCALES - GENERAL: PAINLEVEL_OUTOF10: 0-NO PAIN

## 2024-11-15 NOTE — SIGNIFICANT EVENT
End of 30 min observation period, VSS, no side effects noted, RN instructed patient to go to ED with any concerning symptoms.

## 2024-11-18 ENCOUNTER — TELEPHONE (OUTPATIENT)
Dept: SURGERY | Facility: CLINIC | Age: 49
End: 2024-11-18
Payer: COMMERCIAL

## 2024-11-19 ENCOUNTER — APPOINTMENT (OUTPATIENT)
Dept: HEMATOLOGY/ONCOLOGY | Facility: CLINIC | Age: 49
End: 2024-11-19
Payer: COMMERCIAL

## 2024-11-20 ENCOUNTER — TELEPHONE (OUTPATIENT)
Dept: SURGERY | Facility: CLINIC | Age: 49
End: 2024-11-20
Payer: COMMERCIAL

## 2024-11-21 ENCOUNTER — TELEPHONE (OUTPATIENT)
Dept: SURGERY | Facility: CLINIC | Age: 49
End: 2024-11-21
Payer: COMMERCIAL

## 2024-11-21 NOTE — TELEPHONE ENCOUNTER
Jag Crabtree, just left another voice mail to confirm your scheduled follow-up appt with Dr. Mckeon on 11/25/24 and to ask you the usual pre-visit questions.  Please call me back today or tomorrow afternoon to complete this. Thanks, Yomaira Luna, [facility] Nurse Dr. Mckeon  [phone]

## 2024-11-22 ENCOUNTER — INFUSION (OUTPATIENT)
Dept: HEMATOLOGY/ONCOLOGY | Facility: CLINIC | Age: 49
End: 2024-11-22
Payer: COMMERCIAL

## 2024-11-22 VITALS
DIASTOLIC BLOOD PRESSURE: 80 MMHG | WEIGHT: 177.47 LBS | OXYGEN SATURATION: 97 % | TEMPERATURE: 98.2 F | RESPIRATION RATE: 18 BRPM | HEART RATE: 80 BPM | BODY MASS INDEX: 30.83 KG/M2 | SYSTOLIC BLOOD PRESSURE: 128 MMHG

## 2024-11-22 DIAGNOSIS — K90.9 MALABSORPTION OF IRON (HHS-HCC): ICD-10-CM

## 2024-11-22 DIAGNOSIS — Z90.3 INTESTINAL MALABSORPTION FOLLOWING GASTRECTOMY (HHS-HCC): ICD-10-CM

## 2024-11-22 DIAGNOSIS — D50.9 IRON DEFICIENCY ANEMIA, UNSPECIFIED IRON DEFICIENCY ANEMIA TYPE: ICD-10-CM

## 2024-11-22 DIAGNOSIS — Z98.84 BARIATRIC SURGERY STATUS: ICD-10-CM

## 2024-11-22 DIAGNOSIS — K91.2 INTESTINAL MALABSORPTION FOLLOWING GASTRECTOMY (HHS-HCC): ICD-10-CM

## 2024-11-22 DIAGNOSIS — D50.8 OTHER IRON DEFICIENCY ANEMIA: ICD-10-CM

## 2024-11-22 PROCEDURE — 96374 THER/PROPH/DIAG INJ IV PUSH: CPT | Mod: INF

## 2024-11-22 PROCEDURE — 2500000004 HC RX 250 GENERAL PHARMACY W/ HCPCS (ALT 636 FOR OP/ED): Performed by: PHYSICIAN ASSISTANT

## 2024-11-22 RX ORDER — FAMOTIDINE 10 MG/ML
20 INJECTION INTRAVENOUS ONCE AS NEEDED
OUTPATIENT
Start: 2024-11-30

## 2024-11-22 RX ORDER — HEPARIN 100 UNIT/ML
500 SYRINGE INTRAVENOUS AS NEEDED
OUTPATIENT
Start: 2024-11-22

## 2024-11-22 RX ORDER — FAMOTIDINE 10 MG/ML
20 INJECTION INTRAVENOUS ONCE AS NEEDED
Status: DISCONTINUED | OUTPATIENT
Start: 2024-11-22 | End: 2024-11-22 | Stop reason: HOSPADM

## 2024-11-22 RX ORDER — EPINEPHRINE 0.3 MG/.3ML
0.3 INJECTION SUBCUTANEOUS EVERY 5 MIN PRN
OUTPATIENT
Start: 2024-11-30

## 2024-11-22 RX ORDER — ALBUTEROL SULFATE 0.83 MG/ML
3 SOLUTION RESPIRATORY (INHALATION) AS NEEDED
OUTPATIENT
Start: 2024-11-30

## 2024-11-22 RX ORDER — HEPARIN SODIUM,PORCINE/PF 10 UNIT/ML
50 SYRINGE (ML) INTRAVENOUS AS NEEDED
Status: DISCONTINUED | OUTPATIENT
Start: 2024-11-22 | End: 2024-11-22 | Stop reason: HOSPADM

## 2024-11-22 RX ORDER — ALBUTEROL SULFATE 0.83 MG/ML
3 SOLUTION RESPIRATORY (INHALATION) AS NEEDED
Status: DISCONTINUED | OUTPATIENT
Start: 2024-11-22 | End: 2024-11-22 | Stop reason: HOSPADM

## 2024-11-22 RX ORDER — HEPARIN SODIUM,PORCINE/PF 10 UNIT/ML
50 SYRINGE (ML) INTRAVENOUS AS NEEDED
OUTPATIENT
Start: 2024-11-22

## 2024-11-22 RX ORDER — HEPARIN 100 UNIT/ML
500 SYRINGE INTRAVENOUS AS NEEDED
Status: DISCONTINUED | OUTPATIENT
Start: 2024-11-22 | End: 2024-11-22 | Stop reason: HOSPADM

## 2024-11-22 RX ORDER — EPINEPHRINE 0.3 MG/.3ML
0.3 INJECTION SUBCUTANEOUS EVERY 5 MIN PRN
Status: DISCONTINUED | OUTPATIENT
Start: 2024-11-22 | End: 2024-11-22 | Stop reason: HOSPADM

## 2024-11-22 RX ORDER — DIPHENHYDRAMINE HYDROCHLORIDE 50 MG/ML
50 INJECTION INTRAMUSCULAR; INTRAVENOUS AS NEEDED
OUTPATIENT
Start: 2024-11-30

## 2024-11-22 RX ORDER — DIPHENHYDRAMINE HYDROCHLORIDE 50 MG/ML
50 INJECTION INTRAMUSCULAR; INTRAVENOUS AS NEEDED
Status: DISCONTINUED | OUTPATIENT
Start: 2024-11-22 | End: 2024-11-22 | Stop reason: HOSPADM

## 2024-11-22 ASSESSMENT — PAIN SCALES - GENERAL: PAINLEVEL_OUTOF10: 0-NO PAIN

## 2024-11-22 NOTE — SIGNIFICANT EVENT
End of 30 min post observation period, VSS, no side effects noted, RN instructed patient to go to ED with any new concerning symptoms.

## 2024-11-22 NOTE — SIGNIFICANT EVENT
End of venofer IVP, VSS, no side effects noted, will continue to monitor for 30 mins post infusion.

## 2024-11-25 ENCOUNTER — APPOINTMENT (OUTPATIENT)
Dept: SURGERY | Facility: CLINIC | Age: 49
End: 2024-11-25
Payer: COMMERCIAL

## 2024-11-25 VITALS
WEIGHT: 173 LBS | BODY MASS INDEX: 30.05 KG/M2 | HEART RATE: 72 BPM | DIASTOLIC BLOOD PRESSURE: 89 MMHG | SYSTOLIC BLOOD PRESSURE: 131 MMHG

## 2024-11-25 DIAGNOSIS — R10.84 GENERALIZED ABDOMINAL PAIN: Primary | ICD-10-CM

## 2024-11-25 DIAGNOSIS — D50.9 IRON DEFICIENCY ANEMIA, UNSPECIFIED IRON DEFICIENCY ANEMIA TYPE: ICD-10-CM

## 2024-11-25 DIAGNOSIS — K44.9 PARAESOPHAGEAL HERNIA WITH GASTROESOPHAGEAL REFLUX: ICD-10-CM

## 2024-11-25 DIAGNOSIS — K21.9 PARAESOPHAGEAL HERNIA WITH GASTROESOPHAGEAL REFLUX: ICD-10-CM

## 2024-11-25 DIAGNOSIS — Z98.84 BARIATRIC SURGERY STATUS: ICD-10-CM

## 2024-11-25 PROCEDURE — 99215 OFFICE O/P EST HI 40 MIN: CPT | Performed by: SURGERY

## 2024-11-26 ENCOUNTER — APPOINTMENT (OUTPATIENT)
Dept: HEMATOLOGY/ONCOLOGY | Facility: CLINIC | Age: 49
End: 2024-11-26
Payer: COMMERCIAL

## 2024-11-26 ENCOUNTER — INFUSION (OUTPATIENT)
Dept: HEMATOLOGY/ONCOLOGY | Facility: CLINIC | Age: 49
End: 2024-11-26
Payer: COMMERCIAL

## 2024-11-26 VITALS
RESPIRATION RATE: 18 BRPM | OXYGEN SATURATION: 95 % | HEART RATE: 76 BPM | SYSTOLIC BLOOD PRESSURE: 118 MMHG | WEIGHT: 172.84 LBS | DIASTOLIC BLOOD PRESSURE: 76 MMHG | TEMPERATURE: 99.5 F | BODY MASS INDEX: 30.02 KG/M2

## 2024-11-26 DIAGNOSIS — D50.9 IRON DEFICIENCY ANEMIA, UNSPECIFIED IRON DEFICIENCY ANEMIA TYPE: ICD-10-CM

## 2024-11-26 DIAGNOSIS — Z98.84 BARIATRIC SURGERY STATUS: ICD-10-CM

## 2024-11-26 DIAGNOSIS — K90.9 MALABSORPTION OF IRON (HHS-HCC): ICD-10-CM

## 2024-11-26 DIAGNOSIS — K91.2 INTESTINAL MALABSORPTION FOLLOWING GASTRECTOMY (HHS-HCC): ICD-10-CM

## 2024-11-26 DIAGNOSIS — Z90.3 INTESTINAL MALABSORPTION FOLLOWING GASTRECTOMY (HHS-HCC): ICD-10-CM

## 2024-11-26 PROCEDURE — 2500000004 HC RX 250 GENERAL PHARMACY W/ HCPCS (ALT 636 FOR OP/ED): Performed by: PHYSICIAN ASSISTANT

## 2024-11-26 PROCEDURE — 96374 THER/PROPH/DIAG INJ IV PUSH: CPT | Mod: INF

## 2024-11-26 RX ORDER — ALBUTEROL SULFATE 0.83 MG/ML
3 SOLUTION RESPIRATORY (INHALATION) AS NEEDED
Status: CANCELLED | OUTPATIENT
Start: 2024-11-30

## 2024-11-26 RX ORDER — EPINEPHRINE 0.3 MG/.3ML
0.3 INJECTION SUBCUTANEOUS EVERY 5 MIN PRN
Status: CANCELLED | OUTPATIENT
Start: 2024-11-30

## 2024-11-26 RX ORDER — FAMOTIDINE 10 MG/ML
20 INJECTION INTRAVENOUS ONCE AS NEEDED
Status: CANCELLED | OUTPATIENT
Start: 2024-11-30

## 2024-11-26 RX ORDER — DIPHENHYDRAMINE HYDROCHLORIDE 50 MG/ML
50 INJECTION INTRAMUSCULAR; INTRAVENOUS AS NEEDED
Status: CANCELLED | OUTPATIENT
Start: 2024-11-30

## 2024-11-26 ASSESSMENT — PAIN SCALES - GENERAL: PAINLEVEL_OUTOF10: 0-NO PAIN

## 2024-11-29 ENCOUNTER — INFUSION (OUTPATIENT)
Dept: HEMATOLOGY/ONCOLOGY | Facility: CLINIC | Age: 49
End: 2024-11-29
Payer: COMMERCIAL

## 2024-11-29 VITALS
RESPIRATION RATE: 16 BRPM | WEIGHT: 173.39 LBS | HEART RATE: 77 BPM | TEMPERATURE: 99 F | BODY MASS INDEX: 30.12 KG/M2 | DIASTOLIC BLOOD PRESSURE: 75 MMHG | OXYGEN SATURATION: 96 % | SYSTOLIC BLOOD PRESSURE: 112 MMHG

## 2024-11-29 DIAGNOSIS — Z90.3 INTESTINAL MALABSORPTION FOLLOWING GASTRECTOMY (HHS-HCC): ICD-10-CM

## 2024-11-29 DIAGNOSIS — D50.8 OTHER IRON DEFICIENCY ANEMIA: ICD-10-CM

## 2024-11-29 DIAGNOSIS — K91.2 INTESTINAL MALABSORPTION FOLLOWING GASTRECTOMY (HHS-HCC): ICD-10-CM

## 2024-11-29 DIAGNOSIS — K90.9 MALABSORPTION OF IRON (HHS-HCC): ICD-10-CM

## 2024-11-29 DIAGNOSIS — D50.9 IRON DEFICIENCY ANEMIA, UNSPECIFIED IRON DEFICIENCY ANEMIA TYPE: ICD-10-CM

## 2024-11-29 DIAGNOSIS — Z98.84 BARIATRIC SURGERY STATUS: ICD-10-CM

## 2024-11-29 PROCEDURE — 2500000004 HC RX 250 GENERAL PHARMACY W/ HCPCS (ALT 636 FOR OP/ED): Performed by: PHYSICIAN ASSISTANT

## 2024-11-29 PROCEDURE — 96374 THER/PROPH/DIAG INJ IV PUSH: CPT | Mod: INF

## 2024-11-29 RX ORDER — FAMOTIDINE 10 MG/ML
20 INJECTION INTRAVENOUS ONCE AS NEEDED
Status: DISCONTINUED | OUTPATIENT
Start: 2024-11-29 | End: 2024-11-29 | Stop reason: HOSPADM

## 2024-11-29 RX ORDER — FAMOTIDINE 10 MG/ML
20 INJECTION INTRAVENOUS ONCE AS NEEDED
OUTPATIENT
Start: 2024-12-07

## 2024-11-29 RX ORDER — HEPARIN SODIUM,PORCINE/PF 10 UNIT/ML
50 SYRINGE (ML) INTRAVENOUS AS NEEDED
OUTPATIENT
Start: 2024-11-29

## 2024-11-29 RX ORDER — ALBUTEROL SULFATE 0.83 MG/ML
3 SOLUTION RESPIRATORY (INHALATION) AS NEEDED
Status: DISCONTINUED | OUTPATIENT
Start: 2024-11-29 | End: 2024-11-29 | Stop reason: HOSPADM

## 2024-11-29 RX ORDER — EPINEPHRINE 0.3 MG/.3ML
0.3 INJECTION SUBCUTANEOUS EVERY 5 MIN PRN
OUTPATIENT
Start: 2024-12-07

## 2024-11-29 RX ORDER — EPINEPHRINE 0.3 MG/.3ML
0.3 INJECTION SUBCUTANEOUS EVERY 5 MIN PRN
Status: DISCONTINUED | OUTPATIENT
Start: 2024-11-29 | End: 2024-11-29 | Stop reason: HOSPADM

## 2024-11-29 RX ORDER — DIPHENHYDRAMINE HYDROCHLORIDE 50 MG/ML
50 INJECTION INTRAMUSCULAR; INTRAVENOUS AS NEEDED
OUTPATIENT
Start: 2024-12-07

## 2024-11-29 RX ORDER — ALBUTEROL SULFATE 0.83 MG/ML
3 SOLUTION RESPIRATORY (INHALATION) AS NEEDED
OUTPATIENT
Start: 2024-12-07

## 2024-11-29 RX ORDER — DIPHENHYDRAMINE HYDROCHLORIDE 50 MG/ML
50 INJECTION INTRAMUSCULAR; INTRAVENOUS AS NEEDED
Status: DISCONTINUED | OUTPATIENT
Start: 2024-11-29 | End: 2024-11-29 | Stop reason: HOSPADM

## 2024-11-29 RX ORDER — HEPARIN 100 UNIT/ML
500 SYRINGE INTRAVENOUS AS NEEDED
OUTPATIENT
Start: 2024-11-29

## 2024-11-29 ASSESSMENT — PAIN SCALES - GENERAL
PAINLEVEL_OUTOF10: 0-NO PAIN

## 2024-12-02 ENCOUNTER — APPOINTMENT (OUTPATIENT)
Dept: SURGERY | Facility: CLINIC | Age: 49
End: 2024-12-02
Payer: COMMERCIAL

## 2024-12-02 NOTE — PATIENT INSTRUCTIONS
Avoid popcorn, nuts and seeds  Stay on high fiber diet  See GYN about the lower abdominal pain  We will plan for a diagnostic laparoscopy  Start vitamin C daily to help iron absorption.       Dr. Chiquis Mckeon M.D., MPH  Director of Bariatric & Minimally Invasive Surgery  [place] [address]  T:  [phone]  F:  [phone]     Jacey Smalls, RN Assistant Nurse Manager, Care Coordinator - Bariatric/General Surgery [facility] Patient Nursing Contact  T:  [phone]  F:  [phone]

## 2024-12-05 ENCOUNTER — APPOINTMENT (OUTPATIENT)
Dept: HEMATOLOGY/ONCOLOGY | Facility: CLINIC | Age: 49
End: 2024-12-05
Payer: COMMERCIAL

## 2024-12-10 ENCOUNTER — TELEPHONE (OUTPATIENT)
Dept: HEMATOLOGY/ONCOLOGY | Facility: CLINIC | Age: 49
End: 2024-12-10
Payer: COMMERCIAL

## 2024-12-10 NOTE — TELEPHONE ENCOUNTER
Spoke with patient and confirmed cancellation of 12/11 appointment. She will plan to recheck labs end if Jan and follow up as scheduled 2/10/25.

## 2024-12-11 ENCOUNTER — APPOINTMENT (OUTPATIENT)
Dept: HEMATOLOGY/ONCOLOGY | Facility: CLINIC | Age: 49
End: 2024-12-11
Payer: COMMERCIAL

## 2024-12-11 ENCOUNTER — ANESTHESIA EVENT (OUTPATIENT)
Dept: OPERATING ROOM | Facility: HOSPITAL | Age: 49
End: 2024-12-11
Payer: COMMERCIAL

## 2024-12-11 ENCOUNTER — TELEPHONE (OUTPATIENT)
Dept: PREADMISSION TESTING | Facility: HOSPITAL | Age: 49
End: 2024-12-11
Payer: COMMERCIAL

## 2024-12-11 NOTE — TELEPHONE ENCOUNTER
SURGERY PRE-OPERATIVE INSTRUCTIONS    *You will receive a phone call the day before your procedure  after 2pm, (or the Friday before your surgery if scheduled on a Monday.) Generally the hospital will be calling you with this information after that time.    *You are not to eat after midnight the night before the surgery. You may have up to 13 ounces of clear liquids up until 2 hours prior to arriving to the hospital. The exception is with medications you were instructed to take day of surgery.    *You may take tylenol for pain/discomfort as needed.     *Stop taking all aspirin products, ibuprofen (motrin/advil), naproxen (aleve/naprosyn) for one week prior to surgery.    *Stop taking all vitamins and supplements one week prior to surgery.     *You should not have alcoholic beverages for 24 hours before surgery.     *You should not smoke 24 hours prior to surgery.     *To help prevent surgical infections bathe/shower with Dial soap the evening before surgery.    *You can wear deodorant but no lotion, powder, or perfume/cologne. You should remove all make-up and nail polish at home.    *If you wear glasses, please bring a case for the glasses with you.    *You will be asked to remove dentures and contacts.     *Please leave all valuables at home.    *You should wear loose, comfortable clothing that will accommodate bandages and/or casts.    *You should notify your doctor of any change in your condition (fever, cold, rash, etc). Surgery may need to be re-scheduled until a time you are in better health.    *A responsible adult is required to accompany you to and from the hospital if you are receiving anesthesia or a sedative. Patients are not permitted to drive for 24 hours after anesthesia.     *You can use the [employer] parking if you wish.     *If you have any further questions please call [facility] [phone].

## 2024-12-12 RX ORDER — CEFAZOLIN SODIUM 2 G/100ML
2 INJECTION, SOLUTION INTRAVENOUS ONCE
Status: CANCELLED | OUTPATIENT
Start: 2024-12-12 | End: 2024-12-12

## 2024-12-12 RX ORDER — HEPARIN SODIUM 5000 [USP'U]/ML
5000 INJECTION, SOLUTION INTRAVENOUS; SUBCUTANEOUS ONCE
Status: CANCELLED | OUTPATIENT
Start: 2024-12-12 | End: 2024-12-12

## 2024-12-13 ENCOUNTER — HOSPITAL ENCOUNTER (OUTPATIENT)
Facility: HOSPITAL | Age: 49
Setting detail: OUTPATIENT SURGERY
Discharge: HOME | End: 2024-12-13
Attending: SURGERY | Admitting: SURGERY
Payer: COMMERCIAL

## 2024-12-13 ENCOUNTER — ANESTHESIA (OUTPATIENT)
Dept: OPERATING ROOM | Facility: HOSPITAL | Age: 49
End: 2024-12-13
Payer: COMMERCIAL

## 2024-12-13 ENCOUNTER — APPOINTMENT (OUTPATIENT)
Dept: CARDIOLOGY | Facility: HOSPITAL | Age: 49
End: 2024-12-13
Payer: COMMERCIAL

## 2024-12-13 ENCOUNTER — PHARMACY VISIT (OUTPATIENT)
Dept: PHARMACY | Facility: CLINIC | Age: 49
End: 2024-12-13
Payer: MEDICARE

## 2024-12-13 VITALS
RESPIRATION RATE: 16 BRPM | HEART RATE: 73 BPM | WEIGHT: 167.55 LBS | TEMPERATURE: 98.1 F | OXYGEN SATURATION: 100 % | SYSTOLIC BLOOD PRESSURE: 111 MMHG | DIASTOLIC BLOOD PRESSURE: 61 MMHG | HEIGHT: 64 IN | BODY MASS INDEX: 28.6 KG/M2

## 2024-12-13 DIAGNOSIS — K44.9 PARAESOPHAGEAL HERNIA WITH GASTROESOPHAGEAL REFLUX: ICD-10-CM

## 2024-12-13 DIAGNOSIS — R11.10 ABDOMINAL PAIN WITH VOMITING AND HISTORY OF ABDOMINAL SURGERY: Primary | ICD-10-CM

## 2024-12-13 DIAGNOSIS — K21.9 PARAESOPHAGEAL HERNIA WITH GASTROESOPHAGEAL REFLUX: ICD-10-CM

## 2024-12-13 DIAGNOSIS — R10.9 ABDOMINAL PAIN WITH VOMITING AND HISTORY OF ABDOMINAL SURGERY: Primary | ICD-10-CM

## 2024-12-13 DIAGNOSIS — Z98.890 ABDOMINAL PAIN WITH VOMITING AND HISTORY OF ABDOMINAL SURGERY: Primary | ICD-10-CM

## 2024-12-13 LAB
ABO GROUP (TYPE) IN BLOOD: NORMAL
ANION GAP SERPL CALC-SCNC: 9 MMOL/L (ref 10–20)
ATRIAL RATE: 69 BPM
BUN SERPL-MCNC: 9 MG/DL (ref 6–23)
CALCIUM SERPL-MCNC: 8.7 MG/DL (ref 8.6–10.3)
CHLORIDE SERPL-SCNC: 107 MMOL/L (ref 98–107)
CO2 SERPL-SCNC: 28 MMOL/L (ref 21–32)
CREAT SERPL-MCNC: 0.65 MG/DL (ref 0.5–1.05)
EGFRCR SERPLBLD CKD-EPI 2021: >90 ML/MIN/1.73M*2
ERYTHROCYTE [DISTWIDTH] IN BLOOD BY AUTOMATED COUNT: 11.9 % (ref 11.5–14.5)
GLUCOSE SERPL-MCNC: 81 MG/DL (ref 74–99)
HCT VFR BLD AUTO: 44.4 % (ref 36–46)
HGB BLD-MCNC: 14.3 G/DL (ref 12–16)
MCH RBC QN AUTO: 30.6 PG (ref 26–34)
MCHC RBC AUTO-ENTMCNC: 32.2 G/DL (ref 32–36)
MCV RBC AUTO: 95 FL (ref 80–100)
NRBC BLD-RTO: 0 /100 WBCS (ref 0–0)
P OFFSET: 181 MS
P ONSET: 145 MS
PLATELET # BLD AUTO: 286 X10*3/UL (ref 150–450)
POTASSIUM SERPL-SCNC: 4.3 MMOL/L (ref 3.5–5.3)
PR INTERVAL: 142 MS
Q ONSET: 216 MS
QRS COUNT: 11 BEATS
QRS DURATION: 90 MS
QT INTERVAL: 408 MS
QTC CALCULATION(BAZETT): 437 MS
QTC FREDERICIA: 427 MS
R AXIS: 129 DEGREES
RBC # BLD AUTO: 4.67 X10*6/UL (ref 4–5.2)
RH FACTOR (ANTIGEN D): NORMAL
SODIUM SERPL-SCNC: 140 MMOL/L (ref 136–145)
T AXIS: 147 DEGREES
T OFFSET: 420 MS
VENTRICULAR RATE: 69 BPM
WBC # BLD AUTO: 3.8 X10*3/UL (ref 4.4–11.3)

## 2024-12-13 PROCEDURE — 36415 COLL VENOUS BLD VENIPUNCTURE: CPT | Performed by: ANESTHESIOLOGY

## 2024-12-13 PROCEDURE — 7100000002 HC RECOVERY ROOM TIME - EACH INCREMENTAL 1 MINUTE: Performed by: SURGERY

## 2024-12-13 PROCEDURE — 7100000009 HC PHASE TWO TIME - INITIAL BASE CHARGE: Performed by: SURGERY

## 2024-12-13 PROCEDURE — 44180 LAP ENTEROLYSIS: CPT | Performed by: STUDENT IN AN ORGANIZED HEALTH CARE EDUCATION/TRAINING PROGRAM

## 2024-12-13 PROCEDURE — 7100000010 HC PHASE TWO TIME - EACH INCREMENTAL 1 MINUTE: Performed by: SURGERY

## 2024-12-13 PROCEDURE — 2500000004 HC RX 250 GENERAL PHARMACY W/ HCPCS (ALT 636 FOR OP/ED): Performed by: NURSE ANESTHETIST, CERTIFIED REGISTERED

## 2024-12-13 PROCEDURE — 85027 COMPLETE CBC AUTOMATED: CPT | Performed by: ANESTHESIOLOGY

## 2024-12-13 PROCEDURE — 2720000007 HC OR 272 NO HCPCS: Performed by: SURGERY

## 2024-12-13 PROCEDURE — 3700000001 HC GENERAL ANESTHESIA TIME - INITIAL BASE CHARGE: Performed by: SURGERY

## 2024-12-13 PROCEDURE — 44180 LAP ENTEROLYSIS: CPT | Performed by: SURGERY

## 2024-12-13 PROCEDURE — RXMED WILLOW AMBULATORY MEDICATION CHARGE

## 2024-12-13 PROCEDURE — 3700000002 HC GENERAL ANESTHESIA TIME - EACH INCREMENTAL 1 MINUTE: Performed by: SURGERY

## 2024-12-13 PROCEDURE — 80048 BASIC METABOLIC PNL TOTAL CA: CPT | Performed by: ANESTHESIOLOGY

## 2024-12-13 PROCEDURE — 2500000004 HC RX 250 GENERAL PHARMACY W/ HCPCS (ALT 636 FOR OP/ED): Performed by: SURGERY

## 2024-12-13 PROCEDURE — 3600000008 HC OR TIME - EACH INCREMENTAL 1 MINUTE - PROCEDURE LEVEL THREE: Performed by: SURGERY

## 2024-12-13 PROCEDURE — 7100000001 HC RECOVERY ROOM TIME - INITIAL BASE CHARGE: Performed by: SURGERY

## 2024-12-13 PROCEDURE — 3600000003 HC OR TIME - INITIAL BASE CHARGE - PROCEDURE LEVEL THREE: Performed by: SURGERY

## 2024-12-13 PROCEDURE — 93005 ELECTROCARDIOGRAM TRACING: CPT | Mod: 59

## 2024-12-13 PROCEDURE — 2500000005 HC RX 250 GENERAL PHARMACY W/O HCPCS: Performed by: SURGERY

## 2024-12-13 PROCEDURE — 2500000004 HC RX 250 GENERAL PHARMACY W/ HCPCS (ALT 636 FOR OP/ED): Performed by: ANESTHESIOLOGY

## 2024-12-13 RX ORDER — SODIUM CHLORIDE, SODIUM LACTATE, POTASSIUM CHLORIDE, CALCIUM CHLORIDE 600; 310; 30; 20 MG/100ML; MG/100ML; MG/100ML; MG/100ML
20 INJECTION, SOLUTION INTRAVENOUS CONTINUOUS
Status: DISCONTINUED | OUTPATIENT
Start: 2024-12-13 | End: 2024-12-13 | Stop reason: HOSPADM

## 2024-12-13 RX ORDER — SODIUM CHLORIDE, SODIUM LACTATE, POTASSIUM CHLORIDE, CALCIUM CHLORIDE 600; 310; 30; 20 MG/100ML; MG/100ML; MG/100ML; MG/100ML
100 INJECTION, SOLUTION INTRAVENOUS CONTINUOUS
Status: DISCONTINUED | OUTPATIENT
Start: 2024-12-13 | End: 2024-12-13 | Stop reason: HOSPADM

## 2024-12-13 RX ORDER — MEPERIDINE HYDROCHLORIDE 25 MG/ML
12.5 INJECTION INTRAMUSCULAR; INTRAVENOUS; SUBCUTANEOUS EVERY 10 MIN PRN
Status: DISCONTINUED | OUTPATIENT
Start: 2024-12-13 | End: 2024-12-13 | Stop reason: HOSPADM

## 2024-12-13 RX ORDER — DROPERIDOL 2.5 MG/ML
0.62 INJECTION, SOLUTION INTRAMUSCULAR; INTRAVENOUS ONCE AS NEEDED
Status: COMPLETED | OUTPATIENT
Start: 2024-12-13 | End: 2024-12-13

## 2024-12-13 RX ORDER — LIDOCAINE HYDROCHLORIDE 20 MG/ML
INJECTION, SOLUTION INFILTRATION; PERINEURAL AS NEEDED
Status: DISCONTINUED | OUTPATIENT
Start: 2024-12-13 | End: 2024-12-13

## 2024-12-13 RX ORDER — ROCURONIUM BROMIDE 10 MG/ML
INJECTION, SOLUTION INTRAVENOUS AS NEEDED
Status: DISCONTINUED | OUTPATIENT
Start: 2024-12-13 | End: 2024-12-13

## 2024-12-13 RX ORDER — DIPHENHYDRAMINE HYDROCHLORIDE 50 MG/ML
12.5 INJECTION INTRAMUSCULAR; INTRAVENOUS ONCE AS NEEDED
Status: DISCONTINUED | OUTPATIENT
Start: 2024-12-13 | End: 2024-12-13 | Stop reason: HOSPADM

## 2024-12-13 RX ORDER — ALBUTEROL SULFATE 0.83 MG/ML
2.5 SOLUTION RESPIRATORY (INHALATION) ONCE AS NEEDED
Status: DISCONTINUED | OUTPATIENT
Start: 2024-12-13 | End: 2024-12-13 | Stop reason: HOSPADM

## 2024-12-13 RX ORDER — FENTANYL CITRATE 50 UG/ML
INJECTION, SOLUTION INTRAMUSCULAR; INTRAVENOUS AS NEEDED
Status: DISCONTINUED | OUTPATIENT
Start: 2024-12-13 | End: 2024-12-13

## 2024-12-13 RX ORDER — SUCCINYLCHOLINE CHLORIDE 20 MG/ML
INJECTION INTRAMUSCULAR; INTRAVENOUS AS NEEDED
Status: DISCONTINUED | OUTPATIENT
Start: 2024-12-13 | End: 2024-12-13

## 2024-12-13 RX ORDER — CEFAZOLIN 1 G/1
INJECTION, POWDER, FOR SOLUTION INTRAVENOUS AS NEEDED
Status: DISCONTINUED | OUTPATIENT
Start: 2024-12-13 | End: 2024-12-13

## 2024-12-13 RX ORDER — HYDROMORPHONE HYDROCHLORIDE 2 MG/ML
INJECTION, SOLUTION INTRAMUSCULAR; INTRAVENOUS; SUBCUTANEOUS AS NEEDED
Status: DISCONTINUED | OUTPATIENT
Start: 2024-12-13 | End: 2024-12-13

## 2024-12-13 RX ORDER — PROPOFOL 10 MG/ML
INJECTION, EMULSION INTRAVENOUS AS NEEDED
Status: DISCONTINUED | OUTPATIENT
Start: 2024-12-13 | End: 2024-12-13

## 2024-12-13 RX ORDER — ONDANSETRON HYDROCHLORIDE 2 MG/ML
4 INJECTION, SOLUTION INTRAVENOUS ONCE AS NEEDED
Status: COMPLETED | OUTPATIENT
Start: 2024-12-13 | End: 2024-12-13

## 2024-12-13 RX ORDER — OXYCODONE HYDROCHLORIDE 5 MG/1
5 TABLET ORAL EVERY 4 HOURS PRN
Status: DISCONTINUED | OUTPATIENT
Start: 2024-12-13 | End: 2024-12-13 | Stop reason: HOSPADM

## 2024-12-13 RX ORDER — OXYCODONE HYDROCHLORIDE 5 MG/1
5 TABLET ORAL EVERY 6 HOURS PRN
Qty: 8 TABLET | Refills: 0 | Status: SHIPPED | OUTPATIENT
Start: 2024-12-13 | End: 2024-12-15

## 2024-12-13 RX ORDER — MIDAZOLAM HYDROCHLORIDE 1 MG/ML
INJECTION INTRAMUSCULAR; INTRAVENOUS AS NEEDED
Status: DISCONTINUED | OUTPATIENT
Start: 2024-12-13 | End: 2024-12-13

## 2024-12-13 RX ORDER — ONDANSETRON HYDROCHLORIDE 2 MG/ML
INJECTION, SOLUTION INTRAVENOUS AS NEEDED
Status: DISCONTINUED | OUTPATIENT
Start: 2024-12-13 | End: 2024-12-13

## 2024-12-13 RX ORDER — KETOROLAC TROMETHAMINE 30 MG/ML
INJECTION, SOLUTION INTRAMUSCULAR; INTRAVENOUS AS NEEDED
Status: DISCONTINUED | OUTPATIENT
Start: 2024-12-13 | End: 2024-12-13

## 2024-12-13 SDOH — HEALTH STABILITY: MENTAL HEALTH: CURRENT SMOKER: 0

## 2024-12-13 ASSESSMENT — PAIN - FUNCTIONAL ASSESSMENT
PAIN_FUNCTIONAL_ASSESSMENT: 0-10

## 2024-12-13 ASSESSMENT — PAIN SCALES - GENERAL
PAINLEVEL_OUTOF10: 2
PAINLEVEL_OUTOF10: 3
PAINLEVEL_OUTOF10: 3
PAINLEVEL_OUTOF10: 2
PAIN_LEVEL: 0
PAINLEVEL_OUTOF10: 3
PAINLEVEL_OUTOF10: 0 - NO PAIN
PAINLEVEL_OUTOF10: 2

## 2024-12-13 NOTE — ANESTHESIA POSTPROCEDURE EVALUATION
Patient: Leyda Blair    Procedure Summary       Date: 12/13/24 Room / Location: [facility] OR 07 / [facility]    Anesthesia Start: 0933 Anesthesia Stop: 1037    Procedure: EXPLORATION LAPAROSCOPY Diagnosis:       Paraesophageal hernia with gastroesophageal reflux      (Paraesophageal hernia with gastroesophageal reflux [K44.9, K21.9])    Surgeons: Chiquis Mckeon MD MPH Responsible Provider: Everardo Anderson DO    Anesthesia Type: general ASA Status: 2            Anesthesia Type: general    Vitals Value Taken Time   /62 12/13/24 1105   Temp 36.7 °C (98.1 °F) 12/13/24 1035   Pulse 61 12/13/24 1105   Resp 18 12/13/24 1105   SpO2 100 % 12/13/24 1050       Anesthesia Post Evaluation    Patient location during evaluation: PACU  Patient participation: complete - patient participated  Level of consciousness: awake and alert  Pain score: 0  Pain management: adequate  Multimodal analgesia pain management approach  Airway patency: patent  Two or more strategies used to mitigate risk of obstructive sleep apnea  Cardiovascular status: acceptable  Respiratory status: room air and acceptable  Hydration status: acceptable  Postoperative Nausea and Vomiting: none        There were no known notable events for this encounter.

## 2024-12-13 NOTE — DISCHARGE SUMMARY
Discharge Diagnosis  Paraesophageal hernia with gastroesophageal reflux    Issues Requiring Follow-Up  Routine post-op follow up    Test Results Pending At Discharge  Pending Labs       No current pending labs.          Hospital Course  Patient had an uncomplicated diagnostic laparoscopy. She had lysis of adhesions and was discharged home in stable condition after meeting discharge criteria.    Pertinent Physical Exam At Time of Discharge  Physical Exam  Patient is in no acute distress  No respiratory distress  Chest is clear to auscultation  Abdomen is soft, appropriately tender, not distended  No guarding   Incisions are clean dry and intact  Patient is alert and oriented x 3    Home Medications     Medication List      START taking these medications     oxyCODONE 5 mg immediate release tablet; Commonly known as: Roxicodone;   Take 1 tablet (5 mg) by mouth every 6 hours if needed for severe pain (7 -   10) for up to 2 days.     CONTINUE taking these medications     buPROPion  mg 24 hr tablet; Commonly known as: Wellbutrin XL; Take   1 tablet (150 mg) by mouth once daily in the morning.   CALCIUM CARBONATE-VITAMIN D3 ORAL   multivitamin capsule   omeprazole 20 mg DR capsule; Commonly known as: PriLOSEC; Take 2   capsules (40 mg) by mouth 2 times a day before meals.   venlafaxine  mg 24 hr capsule; Commonly known as: Effexor-XR; Take   1 capsule (150 mg) by mouth once daily.       Outpatient Follow-Up  Future Appointments   Date Time Provider Department Center   2/10/2025 11:30 AM Abhay Arnold PA-C [id number] João Hayden MD

## 2024-12-13 NOTE — DISCHARGE INSTRUCTIONS
- OK to shower tomorrow  - Leave your steri-strips on, they fall off on their own  - No driving or operating machinery while on narcotic pain medications  - Use an over the counter stool softener while on narcotic medication  - Keep you appointment with Dr. Mckeon  - No swimming or soaking in a bath tub for 2 weeks  - No lifting more than 10 lbs for 6 weeks

## 2024-12-13 NOTE — OP NOTE
EXPLORATION LAPAROSCOPY Operative Note     Date: 2024  OR Location: [facility] OR    Name: Leyda Blair, : 1975, Age: 49 y.o., MRN: [id number], Sex: female    Diagnosis  Pre-op Diagnosis      * Paraesophageal hernia with gastroesophageal reflux [K44.9, K21.9] Post-op Diagnosis     * Paraesophageal hernia with gastroesophageal reflux [K44.9, K21.9]     Procedures  EXPLORATION LAPAROSCOPY  67474 - MT LAPS ABD PRTM&OMENTUM DX W/WO SPEC BR/WA SPX      Surgeons      * Chiquis Mckeon - Primary    Resident/Fellow/Other Assistant:  Surgeons and Role:  * No surgeons found with a matching role *    Staff:   Circulator: Martha  Scrub Person: Amelia  Surgical Assistant: Katarina Max Person: Anjelica    Anesthesia Staff: Anesthesiologist: Everardo Anderson DO  CRNA: CHRISTIN Ybarra-CRNA    Procedure Summary  Anesthesia: General  ASA: II  Estimated Blood Loss: 2mL  Intra-op Medications:   Administrations occurring from 0900 to 1115 on 24:   Medication Name Total Dose   BUPivacaine-EPINEPHrine (Marcaine w/EPI) 30 mL in sodium chloride 0.9% 30 mL syringe 41 mL   droperidol (Inapsine) injection 0.625 mg 0.625 mg   ondansetron (Zofran) injection 4 mg 4 mg   ceFAZolin (Ancef) vial 1 g 2 g   dexAMETHasone (Decadron) injection 4 mg/mL 8 mg   fentaNYL (Sublimaze) injection 50 mcg/mL 100 mcg   HYDROmorphone (Dilaudid) injection 2 mg/mL 0.6 mg   ketorolac (Toradol) injection 30 mg 30 mg   lactated Ringer's infusion Cannot be calculated   lidocaine (Xylocaine) 2 % 80 mg   midazolam PF (Versed) injection 1 mg/mL 2 mg   ondansetron (Zofran) 2 mg/mL injection 4 mg   propofol (Diprivan) injection 10 mg/mL 150 mg   rocuronium (ZeMuron) 50 mg/5 mL injection 50 mg   succinylcholine (Anectine) 20 mg/mL injection 160 mg   sugammadex (Bridion) 200 mg/2 mL injection 200 mg              Anesthesia Record               Intraprocedure I/O Totals          Intake    lactated Ringer's infusion 600.00 mL    Total Intake 600 mL       Output     Urine 700 mL    Est. Blood Loss 2 mL    Total Output 702 mL       Net    Net Volume -102 mL          Specimen: No specimens collected     Surgeon; Chiquis Mckeon MD  First assist: Harry Hayden MD, no qualified resident was available to assist with this case.  The fellow assisted with all portions of the case including the critical portions    Procedure is diagnostic laparoscopy with lysis of adhesions    Preop diagnosis is abdominal pain  Postoperative diagnosis is abdominal pain due to adhesions in the left lower quadrant     Drains and/or Catheters: * None in log *    Tourniquet Times:         Implants:     Findings: multiple adhesions in LLQ    Indications: Leyda Blair is an 49 y.o. female who is having surgery for Paraesophageal hernia with gastroesophageal reflux [K44.9, K21.9]. Ongoing abdominal pain post rygbp and all other workup was negative.   Taken to OR for exploration.     The patient was seen in the preoperative area. The risks, benefits, complications, treatment options, non-operative alternatives, expected recovery and outcomes were discussed with the patient. The possibilities of reaction to medication, pulmonary aspiration, injury to surrounding structures, bleeding, recurrent infection, the need for additional procedures, failure to diagnose a condition, and creating a complication requiring transfusion or operation were discussed with the patient. The patient concurred with the proposed plan, giving informed consent.  The site of surgery was properly noted/marked if necessary per policy. The patient has been actively warmed in preoperative area. Preoperative antibiotics have been ordered and given within 1 hours of incision. Venous thrombosis prophylaxis have been ordered including bilateral sequential compression devices    Procedure Details: The patient was brought to the OR and placed in the supine position.  General anesthesia was induced.  She was prepped and draped in the usual  sterile fashion.  Next a vertical incision was made just above the umbilicus and carried down to the level of the fascia.  The fascia was incised sharply with entering the peritoneal cavity under direct visualization.  A blunt tipped Schwartz trocar was placed of the abdomen was insufflated with CO2 to 15 mmHg a 10 mm 30 degree scope was inserted and the abdomen was inspected no evidence of any dilated bowel was noted anywhere and an adhesion to the abdominal wall was noted in the left lower quadrant.  We first began by placing two 5 mm trocars on the right side after placing a tap block on this side was placed in the right upper quadrant and 1 in the right lower quadrant.  We then started follow check the pouch and the remnant and no abnormalities were noted in this area with an antegastric Crissy limb.  We then followed the Crissy limb through the transverse mesocolon down to the jejunojejunostomy and no abnormalities were noted we followed the biliopancreatic limb back to the ligament of Treitz no abnormalities were noted and then we started following the common channel down towards the ileocecal valve and no evidence of any abnormalities were noted.  We did see some adhesions in the left lower quadrant and these were taken down using blunt and ultrasonic dissection.  Care was taken to avoid any injury to the bowel.  As we inspected this area further we noted the patient's sigmoid colon to be very redundant and to have multiple adhesions that were somewhat kinking it.  We took down these adhesions so at least the colon sat down a little bit more freely and no longer had the kink.  No other abnormalities were noted there were also some adhesions to her tubes and ovaries on the right side and these were taken down.  At this point no other interventions were taken.  The umbilical port was closed at the level of the fascia using a 0 Vicryl suture placed in simple fashion using laparoscopic suture passer.  The abdomen was  deflated and the trocars were removed.  Skin was closed all sites using 4-0 Vicryl suture we with Steri-Strips and sutures were placed in subcuticular fashion.  Sterile dressings were placed the patient was weakness patient taken recovery in good condition      Complications:  None; patient tolerated the procedure well.    Disposition: PACU - hemodynamically stable.  Condition: stable         Task Performed by RNFA or Surgical Assistant:  Position, prep, drape, hold camera, closre of wound, dressings          Additional Details: kostas    Attending Attestation: I was present and scrubbed for the entire procedure.    Chiquis Mckeon  Phone Number: [phone]

## 2024-12-13 NOTE — ANESTHESIA PROCEDURE NOTES
Airway  Date/Time: 12/13/2024 9:40 AM  Urgency: elective    Airway not difficult    Staffing  Performed: CRNA   Authorized by: Everardo Anderson DO    Performed by: CHRISTIN Ybarra-JYOTI  Patient location during procedure: OR    Indications and Patient Condition  Indications for airway management: anesthesia  Spontaneous Ventilation: absent  Sedation level: deep  Preoxygenated: yes  Patient position: sniffing  Mask difficulty assessment: 1 - vent by mask    Final Airway Details  Final airway type: endotracheal airway      Successful airway: ETT  Cuffed: yes   Successful intubation technique: video laryngoscopy  Facilitating devices/methods: intubating stylet  Endotracheal tube insertion site: oral  Blade type: BigTwist.  Blade size: #4  ETT size (mm): 7.0  Cormack-Lehane Classification: grade I - full view of glottis  Placement verified by: chest auscultation, capnometry and palpation of cuff   Cuff volume (mL): 10  Measured from: lips  ETT to lips (cm): 23  Number of attempts at approach: 1    Additional Comments  Lips and teeth remain in pre-anesthetic condition s/p intubation.

## 2024-12-13 NOTE — ANESTHESIA PREPROCEDURE EVALUATION
"Patient: Leyda Blair    Procedure Information       Date/Time: 12/13/24 0900    Procedure: EXPLORATION LAPAROSCOPY    Location: [facility] OR 07 / [facility]    Surgeons: Chiquis Mckeon MD MPH        HPI: 49 year old female presenting for exploratory laparoscopy. History significant for GERD, Hiatal hernia, anemia    Denies recent cough, cold, runny nose, fever, flu like symptoms. No exertional angina nor exertional dyspnea. No orthopnea, paroxysmal nocturnal dyspnea, leg swelling. Denies palpitations. No issues with bleeding nor blood clots.    Anesthesia history: no issues    Visit Vitals  /75   Pulse 74   Temp 36.9 °C (98.4 °F)   Resp 16   Ht 1.626 m (5' 4\")   Wt 76 kg (167 lb 8.8 oz)   SpO2 96%   BMI 28.76 kg/m²   OB Status Hysterectomy   Smoking Status Never   BSA 1.85 m²        [unfilled]      No results found for this or any previous visit from the past 1095 days.       Encounter Date: 12/13/24   EKG 12 lead   Result Value    Ventricular Rate 69    Atrial Rate 69    WV Interval 142    QRS Duration 90    QT Interval 408    QTC Calculation(Bazett) 437    R Axis 129    T Axis 147    QRS Count 11    Q Onset 216    P Onset 145    P Offset 181    T Offset 420    QTC Fredericia 427    Narrative    Normal sinus rhythm  Septal infarct (cited on or before 21-FEB-2006)  Lateral infarct , age undetermined  Abnormal ECG  When compared with ECG of 21-FEB-2006 18:21,  QRS axis Shifted right  Lateral infarct is now Present  Nonspecific T wave abnormality, worse in Inferior leads        Stress test: none      Relevant Problems   Neuro   (+) Anxiety      GI   (+) Esophageal reflux   (+) Hiatal hernia   (+) Paraesophageal hernia with gastroesophageal reflux      Endocrine   (+) Obesity, unspecified      Hematology   (+) Anemia   (+) Iron deficiency anemia      HEENT   (+) Presbyacusis       Clinical information reviewed:   Tobacco  Allergies  Meds   Med Hx  Surg Hx  OB Status  Fam Hx  Soc   Hx        NPO " Detail:  NPO/Void Status  Carbohydrate Drink Given Prior to Surgery? : N  Date of Last Liquid: 12/12/24  Time of Last Liquid: 2000  Date of Last Solid: 12/12/24  Time of Last Solid: 2000  Last Intake Type: Clear fluids  Time of Last Void: 0700         Physical Exam    Airway  Mallampati: II  TM distance: >3 FB  Neck ROM: full     Cardiovascular   Rhythm: regular  Rate: normal     Dental - normal exam     Pulmonary   Breath sounds clear to auscultation     Abdominal   Abdomen: soft             Anesthesia Plan    History of general anesthesia?: yes  History of complications of general anesthesia?: no    ASA 2     general     The patient is not a current smoker.    intravenous induction   Postoperative administration of opioids is intended.  Trial extubation is planned.  Anesthetic plan and risks discussed with patient.  Use of blood products discussed with patient who consented to blood products.    Plan discussed with CRNA.

## 2024-12-14 NOTE — BRIEF OP NOTE
Date: 2024  OR Location: [facility] OR    Name: Leyda Blair, : 1975, Age: 49 y.o., MRN: [id number], Sex: female    Diagnosis  Pre-op Diagnosis      * Paraesophageal hernia with gastroesophageal reflux [K44.9, K21.9] Post-op Diagnosis     * Paraesophageal hernia with gastroesophageal reflux [K44.9, K21.9]     Procedures  EXPLORATION LAPAROSCOPY  97262 - [place] LAPS ABD PRTM&OMENTUM DX W/WO SPEC BR/WA SPX    Surgeons      * Chiquis Mckeon - Primary    Resident/Fellow/Other Assistant:  Fellow: Harry Hayden    Staff:   Circulator: Martha  Scrub Person: Amelia  Surgical Assistant: Katarina Max Person: Anjelica    Anesthesia Staff: Anesthesiologist: Everardo Anderson DO  CRNA: CHRISTIN Ybarra-JYOTI    Procedure Summary  Anesthesia: General  ASA: II  Estimated Blood Loss: 5mL  Intra-op Medications:   Administrations occurring from 0900 to 1115 on 24:   Medication Name Total Dose   BUPivacaine-EPINEPHrine (Marcaine w/EPI) 30 mL in sodium chloride 0.9% 30 mL syringe 41 mL   droperidol (Inapsine) injection 0.625 mg 0.625 mg   ondansetron (Zofran) injection 4 mg 4 mg   ceFAZolin (Ancef) vial 1 g 2 g   dexAMETHasone (Decadron) injection 4 mg/mL 8 mg   fentaNYL (Sublimaze) injection 50 mcg/mL 100 mcg   HYDROmorphone (Dilaudid) injection 2 mg/mL 0.6 mg   ketorolac (Toradol) injection 30 mg 30 mg   lactated Ringer's infusion Cannot be calculated   lidocaine (Xylocaine) 2 % 80 mg   midazolam PF (Versed) injection 1 mg/mL 2 mg   ondansetron (Zofran) 2 mg/mL injection 4 mg   propofol (Diprivan) injection 10 mg/mL 150 mg   rocuronium (ZeMuron) 50 mg/5 mL injection 50 mg   succinylcholine (Anectine) 20 mg/mL injection 160 mg   sugammadex (Bridion) 200 mg/2 mL injection 200 mg              Anesthesia Record               Intraprocedure I/O Totals          Intake    lactated Ringer's infusion 600.00 mL    Total Intake 600 mL       Output    Urine 700 mL    Est. Blood Loss 2 mL    Total Output 702 mL       Net    Net Volume  -102 mL          Specimen: No specimens collected     Findings: Significant pelvic adhesions which were lysed using a combination of sharp dissection and energy.  No other obvious intra-abdominal pathology all mesenteric defects were inspected and found to be closed.  Intact Crissy-en-Y gastric bypass anatomy.    Complications:  None; patient tolerated the procedure well.     Disposition: PACU - hemodynamically stable.  Condition: stable  Specimens Collected: No specimens collected  Attending Attestation: I was present and scrubbed for the entire procedure.    Chiquis Mckeon  Phone Number: [phone]

## 2024-12-28 ENCOUNTER — LAB (OUTPATIENT)
Dept: LAB | Facility: LAB | Age: 49
End: 2024-12-28
Payer: COMMERCIAL

## 2025-01-07 DIAGNOSIS — F41.9 ANXIETY: ICD-10-CM

## 2025-01-08 RX ORDER — BUPROPION HYDROCHLORIDE 150 MG/1
150 TABLET ORAL EVERY MORNING
Qty: 90 TABLET | Refills: 2 | Status: SHIPPED | OUTPATIENT
Start: 2025-01-08

## 2025-01-13 ENCOUNTER — LAB (OUTPATIENT)
Dept: LAB | Facility: LAB | Age: 50
End: 2025-01-13
Payer: COMMERCIAL

## 2025-01-13 DIAGNOSIS — Z90.3 INTESTINAL MALABSORPTION FOLLOWING GASTRECTOMY (HHS-HCC): ICD-10-CM

## 2025-01-13 DIAGNOSIS — K90.9 MALABSORPTION OF IRON (HHS-HCC): ICD-10-CM

## 2025-01-13 DIAGNOSIS — K91.2 INTESTINAL MALABSORPTION FOLLOWING GASTRECTOMY (HHS-HCC): ICD-10-CM

## 2025-01-13 DIAGNOSIS — D50.9 IRON DEFICIENCY ANEMIA, UNSPECIFIED IRON DEFICIENCY ANEMIA TYPE: ICD-10-CM

## 2025-01-13 DIAGNOSIS — Z98.84 S/P BARIATRIC SURGERY: ICD-10-CM

## 2025-01-13 LAB
ALBUMIN SERPL BCP-MCNC: 4.3 G/DL (ref 3.4–5)
ALP SERPL-CCNC: 65 U/L (ref 33–110)
ALT SERPL W P-5'-P-CCNC: 35 U/L (ref 7–45)
ANION GAP SERPL CALC-SCNC: 12 MMOL/L (ref 10–20)
AST SERPL W P-5'-P-CCNC: 26 U/L (ref 9–39)
BASOPHILS # BLD AUTO: 0.07 X10*3/UL (ref 0–0.1)
BASOPHILS NFR BLD AUTO: 1.1 %
BILIRUB SERPL-MCNC: 0.5 MG/DL (ref 0–1.2)
BUN SERPL-MCNC: 12 MG/DL (ref 6–23)
CALCIUM SERPL-MCNC: 8.8 MG/DL (ref 8.6–10.3)
CHLORIDE SERPL-SCNC: 105 MMOL/L (ref 98–107)
CO2 SERPL-SCNC: 27 MMOL/L (ref 21–32)
CREAT SERPL-MCNC: 0.64 MG/DL (ref 0.5–1.05)
EGFRCR SERPLBLD CKD-EPI 2021: >90 ML/MIN/1.73M*2
EOSINOPHIL # BLD AUTO: 0.35 X10*3/UL (ref 0–0.7)
EOSINOPHIL NFR BLD AUTO: 5.7 %
ERYTHROCYTE [DISTWIDTH] IN BLOOD BY AUTOMATED COUNT: 12.1 % (ref 11.5–14.5)
FERRITIN SERPL-MCNC: 186 NG/ML (ref 8–150)
GLUCOSE SERPL-MCNC: 93 MG/DL (ref 74–99)
HCT VFR BLD AUTO: 45.1 % (ref 36–46)
HGB BLD-MCNC: 14.5 G/DL (ref 12–16)
IMM GRANULOCYTES # BLD AUTO: 0.01 X10*3/UL (ref 0–0.7)
IMM GRANULOCYTES NFR BLD AUTO: 0.2 % (ref 0–0.9)
IRON SATN MFR SERPL: 47 % (ref 25–45)
IRON SERPL-MCNC: 122 UG/DL (ref 35–150)
LYMPHOCYTES # BLD AUTO: 2.56 X10*3/UL (ref 1.2–4.8)
LYMPHOCYTES NFR BLD AUTO: 41.4 %
MCH RBC QN AUTO: 30 PG (ref 26–34)
MCHC RBC AUTO-ENTMCNC: 32.2 G/DL (ref 32–36)
MCV RBC AUTO: 93 FL (ref 80–100)
MONOCYTES # BLD AUTO: 0.56 X10*3/UL (ref 0.1–1)
MONOCYTES NFR BLD AUTO: 9 %
NEUTROPHILS # BLD AUTO: 2.64 X10*3/UL (ref 1.2–7.7)
NEUTROPHILS NFR BLD AUTO: 42.6 %
NRBC BLD-RTO: 0 /100 WBCS (ref 0–0)
PLATELET # BLD AUTO: 275 X10*3/UL (ref 150–450)
POTASSIUM SERPL-SCNC: 4.3 MMOL/L (ref 3.5–5.3)
PROT SERPL-MCNC: 6 G/DL (ref 6.4–8.2)
RBC # BLD AUTO: 4.83 X10*6/UL (ref 4–5.2)
SODIUM SERPL-SCNC: 140 MMOL/L (ref 136–145)
TIBC SERPL-MCNC: 259 UG/DL (ref 240–445)
UIBC SERPL-MCNC: 137 UG/DL (ref 110–370)
WBC # BLD AUTO: 6.2 X10*3/UL (ref 4.4–11.3)

## 2025-01-13 PROCEDURE — 83540 ASSAY OF IRON: CPT

## 2025-01-13 PROCEDURE — 83550 IRON BINDING TEST: CPT

## 2025-01-13 PROCEDURE — 85025 COMPLETE CBC W/AUTO DIFF WBC: CPT

## 2025-01-13 PROCEDURE — 82746 ASSAY OF FOLIC ACID SERUM: CPT

## 2025-01-13 PROCEDURE — 80053 COMPREHEN METABOLIC PANEL: CPT

## 2025-01-13 PROCEDURE — 82728 ASSAY OF FERRITIN: CPT

## 2025-01-13 PROCEDURE — 82607 VITAMIN B-12: CPT

## 2025-01-14 LAB
FOLATE SERPL-MCNC: 19.1 NG/ML
VIT B12 SERPL-MCNC: 1615 PG/ML (ref 211–911)

## 2025-02-05 ENCOUNTER — APPOINTMENT (OUTPATIENT)
Dept: PRIMARY CARE | Facility: CLINIC | Age: 50
End: 2025-02-05
Payer: COMMERCIAL

## 2025-02-05 ENCOUNTER — OFFICE VISIT (OUTPATIENT)
Dept: URGENT CARE | Age: 50
End: 2025-02-05
Payer: COMMERCIAL

## 2025-02-05 ENCOUNTER — ANCILLARY PROCEDURE (OUTPATIENT)
Dept: URGENT CARE | Age: 50
End: 2025-02-05
Payer: COMMERCIAL

## 2025-02-05 VITALS
DIASTOLIC BLOOD PRESSURE: 83 MMHG | RESPIRATION RATE: 22 BRPM | HEART RATE: 98 BPM | WEIGHT: 172 LBS | BODY MASS INDEX: 29.52 KG/M2 | TEMPERATURE: 98.4 F | SYSTOLIC BLOOD PRESSURE: 130 MMHG | OXYGEN SATURATION: 95 %

## 2025-02-05 DIAGNOSIS — J02.9 SORE THROAT: ICD-10-CM

## 2025-02-05 DIAGNOSIS — R05.1 ACUTE COUGH: ICD-10-CM

## 2025-02-05 DIAGNOSIS — R52 BODY ACHES: ICD-10-CM

## 2025-02-05 DIAGNOSIS — R05.1 ACUTE COUGH: Primary | ICD-10-CM

## 2025-02-05 LAB
POC RAPID INFLUENZA A: NEGATIVE
POC RAPID INFLUENZA B: NEGATIVE
POC RAPID STREP: NEGATIVE
POC SARS-COV-2 AG BINAX: NORMAL

## 2025-02-05 PROCEDURE — 71046 X-RAY EXAM CHEST 2 VIEWS: CPT

## 2025-02-05 RX ORDER — ALBUTEROL SULFATE 90 UG/1
2 INHALANT RESPIRATORY (INHALATION) EVERY 6 HOURS PRN
Qty: 18 G | Refills: 0 | Status: SHIPPED | OUTPATIENT
Start: 2025-02-05 | End: 2026-02-05

## 2025-02-05 RX ORDER — DEXAMETHASONE 4 MG/1
10 TABLET ORAL ONCE
Qty: 3 TABLET | Refills: 0 | Status: SHIPPED | OUTPATIENT
Start: 2025-02-05 | End: 2025-02-05

## 2025-02-05 RX ORDER — BENZONATATE 200 MG/1
200 CAPSULE ORAL 3 TIMES DAILY PRN
Qty: 21 CAPSULE | Refills: 0 | Status: SHIPPED | OUTPATIENT
Start: 2025-02-05 | End: 2025-02-12

## 2025-02-05 RX ORDER — AZITHROMYCIN 250 MG/1
TABLET, FILM COATED ORAL
Qty: 6 TABLET | Refills: 0 | Status: SHIPPED | OUTPATIENT
Start: 2025-02-05

## 2025-02-05 NOTE — PROGRESS NOTES
Subjective   Patient ID: Leyda Blair is a 49 y.o. female. They present today with a chief complaint of Sore Throat (Body aches, cough and headache for 4 days).    History of Present Illness  See MDM          Past Medical History  Allergies as of 2025    (No Known Allergies)       (Not in a hospital admission)       Past Medical History:   Diagnosis Date    Abnormal weight loss 2016    Rapid weight loss    Bariatric surgery status     Bilateral impacted cerumen 2024    Clotted blood in stool     Constipation     Epigastric pain     Herpes zoster without complication 2024    History of anemia 2018    History of fatigue 02/15/2019    History of nausea and vomiting 2016    Paraesophageal hernia with gastroesophageal reflux     Perianal venous thrombosis 2016    Thrombosed external hemorrhoids    Rhus dermatitis 2017    Right upper quadrant pain 2016    Abdominal wall pain in right upper quadrant    Spontaneous  2024       Past Surgical History:   Procedure Laterality Date    CHOLECYSTECTOMY      COLONOSCOPY  10/01/2024    ESOPHAGOGASTRODUODENOSCOPY  10/01/2024    GASTRIC BYPASS  2013    For Morbid Obesity Bypass With Crissy-en-Y    HYSTERECTOMY  02/15/2019    LAPAROSCOPY DIAGNOSTIC / BIOPSY / ASPIRATION / LYSIS N/A 2024    exploratory        reports that she has never smoked. She has never been exposed to tobacco smoke. She has never used smokeless tobacco. She reports that she does not currently use alcohol. She reports that she does not use drugs.    Review of Systems  Review of Systems   All other systems reviewed and are negative.                                 Objective    Vitals:    25 1233   BP: 130/83   BP Location: Left arm   Patient Position: Sitting   BP Cuff Size: Adult   Pulse: 98   Resp: 22   Temp: 36.9 °C (98.4 °F)   TempSrc: Temporal   SpO2: 95%   Weight: 78 kg (172 lb)     No LMP recorded. Patient has had a  hysterectomy.    Physical Exam  Vitals and nursing note reviewed.   Constitutional:       General: She is not in acute distress.     Appearance: Normal appearance. She is normal weight. She is not ill-appearing, toxic-appearing or diaphoretic.   HENT:      Head: Normocephalic and atraumatic.      Right Ear: Tympanic membrane, ear canal and external ear normal. There is no impacted cerumen.      Left Ear: Tympanic membrane, ear canal and external ear normal. There is no impacted cerumen.      Nose: No congestion.      Mouth/Throat:      Mouth: Mucous membranes are moist.      Pharynx: No oropharyngeal exudate or posterior oropharyngeal erythema.      Comments: Oropharynx is widely patent.  Mucous membranes are moist.  No erythema, exudate, edema or asymmetry of posterior pharynx or tonsils.  Uvula is midline and without edema.  No muffled voice or trismus.   Eyes:      General: No scleral icterus.        Right eye: No discharge.         Left eye: No discharge.      Extraocular Movements: Extraocular movements intact.      Conjunctiva/sclera: Conjunctivae normal.      Pupils: Pupils are equal, round, and reactive to light.   Cardiovascular:      Rate and Rhythm: Normal rate and regular rhythm.      Pulses: Normal pulses.      Heart sounds: Normal heart sounds. No murmur heard.     No friction rub. No gallop.   Pulmonary:      Effort: Pulmonary effort is normal. No respiratory distress.      Breath sounds: Wheezing present. No rhonchi or rales.   Abdominal:      General: Abdomen is flat.      Tenderness: There is no abdominal tenderness. There is no guarding or rebound.   Musculoskeletal:         General: Normal range of motion.      Cervical back: Normal range of motion and neck supple. No rigidity or tenderness.      Right lower leg: No edema.      Left lower leg: No edema.   Lymphadenopathy:      Cervical: No cervical adenopathy.   Skin:     General: Skin is warm and dry.      Capillary Refill: Capillary refill  takes less than 2 seconds.      Coloration: Skin is not jaundiced or pale.      Findings: No rash.   Neurological:      General: No focal deficit present.      Mental Status: She is alert.      Gait: Gait normal.   Psychiatric:         Mood and Affect: Mood normal.         Behavior: Behavior normal.         Procedures    Point of Care Test & Imaging Results from this visit  Results for orders placed or performed in visit on 02/05/25   POCT Covid-19 Rapid Antigen   Result Value Ref Range    POC ISAEL-COV-2 AG  Presumptive negative test for SARS-CoV-2 (no antigen detected)     Presumptive negative test for SARS-CoV-2 (no antigen detected)   POCT Influenza A/B manually resulted   Result Value Ref Range    POC Rapid Influenza A Negative Negative    POC Rapid Influenza B Negative Negative   POCT rapid strep A manually resulted   Result Value Ref Range    POC Rapid Strep Negative Negative      XR chest 2 views    Result Date: 2/5/2025  Interpreted By:  Maninder Lopez, STUDY: XR CHEST 2 VIEWS;  2/5/2025 1:37 pm   INDICATION: Signs/Symptoms:cough, wheezing since Sunday.   COMPARISON: 08/28/2024   ACCESSION NUMBER(S): [id number]   ORDERING CLINICIAN: TATIANA BRANNON   FINDINGS: CHEST/LUNGS: The cardiac and mediastinal silhouettes are unchanged in size and configuration. Mother with/scarring in the lung bases. No focal areas of consolidation are noted. No effusion or pneumothorax is seen.   UPPER ABDOMEN: No remarkable upper abdominal findings.   OSSEOUS STRUCTURES: No acute changes.       No radiographic evidence of an acute cardiopulmonary process.   MACRO: None.   Signed by: Maninder Lopez 2/5/2025 1:50 PM Dictation workstation:   [id number]     Diagnostic study results (if any) were reviewed by Tatiana Brannon PA-C.    Assessment/Plan   Allergies, medications, history, and pertinent labs/EKGs/Imaging reviewed by Tatiana Brannon PA-C.     Medical Decision Making  49-year-old female with past medical history of deficiency and  gastric bypass presents with complaint of cough, wheezing, arthralgia, myalgia, sore throat ongoing symptoms today.  States symptoms consistent with previous bronchitis.  Coughing up foul-smelling/tasting sputum.  No tobacco use.  No COPD or asthma.  No chest pain shortness of breath extremity swelling or pain.  Exam with expiratory wheezing which is mild.  No features of respiratory distress, sepsis, dehydration, peritonsillar abscess, epiglottitis or other emergency.  COVID, flu and strep testing negative.  CXR without acute finding.  Given increased community incidence of atypical pneumonia we will treat with azithromycin.  Decadron for wheezing and albuterol inhaler as well as Tessalon Perles.  Discussed expected course, indications for return or presentation to emergency department.  Encouraged follow-up with primary care provider.  Discussed indications for presentation to emergency department.  Discharged good condition agreeable to plan as discussed.     Orders and Diagnoses  Diagnoses and all orders for this visit:  Acute cough  -     XR chest 2 views; Future  -     benzonatate (Tessalon) 200 mg capsule; Take 1 capsule (200 mg) by mouth 3 times a day as needed for cough for up to 7 days. Do not crush or chew.  -     azithromycin (Zithromax) 250 mg tablet; Take 500 mg (2 tabs) first day, take 1 tab once daily for four days following  -     albuterol 90 mcg/actuation inhaler; Inhale 2 puffs every 6 hours if needed for wheezing.  -     dexAMETHasone (Decadron) 4 mg tablet; Take 2.5 tablets (10 mg) by mouth 1 time for 1 dose.  Body aches  -     POCT Covid-19 Rapid Antigen  -     POCT Influenza A/B manually resulted  Sore throat  -     POCT rapid strep A manually resulted      Medical Admin Record      Patient disposition: Home    Electronically signed by Tatiana Brannon PA-C  2:59 PM

## 2025-02-10 ENCOUNTER — TELEMEDICINE (OUTPATIENT)
Dept: HEMATOLOGY/ONCOLOGY | Facility: CLINIC | Age: 50
End: 2025-02-10
Payer: COMMERCIAL

## 2025-02-10 DIAGNOSIS — K91.2 INTESTINAL MALABSORPTION FOLLOWING GASTRECTOMY (HHS-HCC): ICD-10-CM

## 2025-02-10 DIAGNOSIS — K90.9 MALABSORPTION OF IRON (HHS-HCC): ICD-10-CM

## 2025-02-10 DIAGNOSIS — Z90.3 INTESTINAL MALABSORPTION FOLLOWING GASTRECTOMY (HHS-HCC): ICD-10-CM

## 2025-02-10 DIAGNOSIS — Z98.84 S/P BARIATRIC SURGERY: ICD-10-CM

## 2025-02-10 DIAGNOSIS — D50.9 IRON DEFICIENCY ANEMIA, UNSPECIFIED IRON DEFICIENCY ANEMIA TYPE: Primary | ICD-10-CM

## 2025-02-10 NOTE — PROGRESS NOTES
Patient ID: Leyda Blair is a 49 y.o. female.  Referring Physician: Abhay Arnold PA-C  [id number] [place] [address]  Primary Care Provider: Emily Kohler MD  Visit Type: Follow Up    Location: [facility]  Diagnosis/Reason: LADONNA (2/2 Post-Surgical Malabsorption s/p RYGB)    Interval Hx:  2/10/25  Leyda Blair is a 49 y.o. female following up today for LADONNA (2/2 post-surgical malabsorption s/p RYGB)    NOTE:  6/3/24 - Patient was previously followed by Soo Booker PA-C but is now transferring care to me effective today. Their last visit was 1/29/24 and it was noted that patient had RYGB in 2013. She was also noted for having IV iron infusion in 2019 w/o adverse effect or reaction. She has also tried oral iron tablets and liquid iron supplementation without desired effect. She has also had partial hysterectomy around 2012. With IKE Booker PA-C patient received IV venofer 12/1, 12/5 and 12/11 without adverse effect or reaction.  Patient's recent labs indicate iron deficiency, therefore patient ordered a cycle of IV iron sucrose (venofer) at 300mg IV x 3 once weekly infusions    Patient denies weight loss, abnormal bruising and bleeding, hematuria, blood in stool, dark/black stools, epistaxis, oral/gingival bleeding, lymphadenopathy, recurrent infections, recurrent fevers, night sweats, early satiety, abdominal pain, bone pain, chest pain, palpitations, SOB, HAYES, fatigue, dizziness, lightheadedness, PICA.    Relevant Hx:  1/29/24 - Last Visit w/ H. CHRISTIN Maki-CNP  Ms. Blair is a 48 years-old female. She is referred to [facility] today for the evaluation and treatment of iron deficiency anemia. Patient states that she had Crissy-En-Y about 10 years ago (2013). States that she has had IV iron infusion in 2019. Patient states that she started oral iron tablets, initially once per day in August and on Nov, 3rd 2023, she switched to oral liquid iron. States that she had partial hysterectomy  about 12 years ago due to dysfunctional uterine bleeding. States that she had colonoscopy done less than 10 years ago. Reports that no polyps were removed.     PMHx:  Active Ambulatory Problems     Diagnosis Date Noted    Iron deficiency anemia 2023    Malabsorption of iron (HHS-HCC) 2023    Intestinal malabsorption following gastrectomy (HHS-HCC) 2024    Tinnitus of both ears 2024    Bariatric surgery status 2024    Presbyacusis 2024    Obesity, unspecified 2024    Malaise and fatigue 2024    Increased appetite 2024    H/O: hysterectomy 2024    Hiatal hernia 2024    Esophageal reflux 2024    Constipation 2024    Anxiety 2024    Anemia 2023    Clotted blood in stool 10/14/2024     Resolved Ambulatory Problems     Diagnosis Date Noted    Spontaneous  2024    Hematuria 2023    Bilateral impacted cerumen 2024    Herpes zoster without complication 2024    Paraesophageal hernia with gastroesophageal reflux 2024     Past Medical History:   Diagnosis Date    Abnormal weight loss 2016    Epigastric pain     History of anemia 2018    History of fatigue 02/15/2019    History of nausea and vomiting 2016    Perianal venous thrombosis 2016    Rhus dermatitis 2017    Right upper quadrant pain 2016     PSHx:  Past Surgical History:   Procedure Laterality Date    CHOLECYSTECTOMY      COLONOSCOPY  10/01/2024    ESOPHAGOGASTRODUODENOSCOPY  10/01/2024    GASTRIC BYPASS  2013    For Morbid Obesity Bypass With Crissy-en-Y    HYSTERECTOMY  02/15/2019    LAPAROSCOPY DIAGNOSTIC / BIOPSY / ASPIRATION / LYSIS N/A 2024    exploratory       FHx:  No family history on file.     Social Hx:  Leyda HATFIELD Johnnie    reports that she has never smoked. She has never been exposed to tobacco smoke. She has never used smokeless tobacco.  She  reports that she does not currently use  alcohol.  She  reports no history of drug use.  Social History     Socioeconomic History    Marital status:    Tobacco Use    Smoking status: Never     Passive exposure: Never    Smokeless tobacco: Never   Substance and Sexual Activity    Alcohol use: Not Currently    Drug use: Never     Medications and allergies reviewed in EMR.    ROS:  Review of Systems - Oncology   10 point review of systems negative except as state in HPI.    Vitals & Statistics:  Objective   BSA: There is no height or weight on file to calculate BSA.  There were no vitals taken for this visit.    Physical Exam:  Physical Exam  Vitals and nursing note reviewed.   Constitutional:       Appearance: Normal appearance. She is normal weight.   HENT:      Head: Normocephalic and atraumatic.      Right Ear: External ear normal.      Left Ear: External ear normal.      Nose: Nose normal.      Mouth/Throat:      Mouth: Mucous membranes are moist.      Pharynx: Oropharynx is clear.   Eyes:      Extraocular Movements: Extraocular movements intact.      Conjunctiva/sclera: Conjunctivae normal.      Pupils: Pupils are equal, round, and reactive to light.   Cardiovascular:      Rate and Rhythm: Normal rate and regular rhythm.      Pulses: Normal pulses.      Heart sounds: Normal heart sounds.   Pulmonary:      Effort: Pulmonary effort is normal.      Breath sounds: Normal breath sounds.   Abdominal:      General: Abdomen is flat. Bowel sounds are normal.      Palpations: Abdomen is soft.      Comments: No masses or organomegaly palpable during exam   Musculoskeletal:         General: Normal range of motion.      Cervical back: Normal range of motion.   Lymphadenopathy:      Comments: No lymphadenopathy palpable   Skin:     General: Skin is warm and dry.      Capillary Refill: Capillary refill takes less than 2 seconds.   Neurological:      Mental Status: She is alert and oriented to person, place, and time. Mental status is at baseline.   Psychiatric:  "        Mood and Affect: Mood normal.         Behavior: Behavior normal.         Thought Content: Thought content normal.         Judgment: Judgment normal.           Results:  Lab Results   Component Value Date    WBC 6.2 01/13/2025    NEUTROABS 2.64 01/13/2025    IGABSOL 0.01 01/13/2025    LYMPHSABS 2.56 01/13/2025    MONOSABS 0.56 01/13/2025    EOSABS 0.35 01/13/2025    BASOSABS 0.07 01/13/2025    RBC 4.83 01/13/2025    MCV 93 01/13/2025    MCHC 32.2 01/13/2025    HGB 14.5 01/13/2025    HCT 45.1 01/13/2025     01/13/2025     No results found for: \"RETICCTPCT\"   Lab Results   Component Value Date    CREATININE 0.64 01/13/2025    BUN 12 01/13/2025    EGFR >90 01/13/2025     01/13/2025    K 4.3 01/13/2025     01/13/2025    CO2 27 01/13/2025      Lab Results   Component Value Date    ALT 35 01/13/2025    AST 26 01/13/2025    ALKPHOS 65 01/13/2025    BILITOT 0.5 01/13/2025      Lab Results   Component Value Date    TSH 1.51 08/28/2024     Lab Results   Component Value Date    TSH 1.51 08/28/2024    E0RFKLH 7.0 08/25/2022     Lab Results   Component Value Date    IRON 122 01/13/2025    TIBC 259 01/13/2025    FERRITIN 186 (H) 01/13/2025      Lab Results   Component Value Date    SZKRKEDC67 1,615 (H) 01/13/2025      Lab Results   Component Value Date    FOLATE 19.1 01/13/2025     No results found for: \"GREGG\", \"RF\", \"SEDRATE\"   No results found for: \"CRP\"   No results found for: \"MINOO\"  No results found for: \"LDH\"  No results found for: \"HAPTOGLOBIN\"  No results found for: \"SPEP\"  No results found for: \"IGG\", \"IGM\", \"IGA\"  No results found for: \"HEPATOT\", \"HEPAIGM\", \"HEPBCIGM\", \"HEPBCAB\", \"HEPBSAG\", \"HEPCAB\"  No results found for: \"HIV1X2\"    Assessment:  Leyda Blair is a 49 y.o. female following up today for LADONNA (2/2 post-surgical malabsorption)    She is asymptomatic at this time    I reviewed patient's chart including but not limited to labs, imaging, surgical/procedure notes, pathology, hospital " notes, doctor's notes.    1/13/25 results:  WBC & Differential WNL  RBC indices & H&H WNL  Platelets WNL  Iron studies: Ferritin elevated at 186, Iron , and TIBC WNL, %Sat WNL elevated at 47% - Likely 2/2 recent IV iron infusions    Plan:  LADONNA 2/2 Post-Surgical Malabsorption s/p RYGB  No need for hematologic intervention at this time  RTC in 4 months via in-person visit - F/U sooner if needed/urgent  CBC-D, Iron studies, Folate, B12 up to 1 week prior  Patient to transfer care to colleague in  Benign Hematology as I am transferring from [facility] to [facility] effective 4/2025    I had an extensive discussion with the patient regarding the diagnosis and discussed the plan of therapy, including general considerations regarding side effects and outcomes. Pt understood and gave appropriate teach back about the plan of care. All questions were answered to the patient's satisfaction. The patient is instructed to contact us at any time if questions or problems arise. Thank you for the opportunity to participate in the care of this very pleasant patient.    Total time = 30 minutes. 50% or more of this time was spent in counseling and/or coordination of care including reviewing medical history/radiology/labs, examining patient, formulating outlined plan with team, and discussing plan with patient/family.    Abhay Arnold PA-C

## 2025-02-11 ENCOUNTER — LAB (OUTPATIENT)
Dept: LAB | Facility: HOSPITAL | Age: 50
End: 2025-02-11
Payer: COMMERCIAL

## 2025-02-11 PROCEDURE — 80053 COMPREHEN METABOLIC PANEL: CPT

## 2025-02-12 ENCOUNTER — LAB (OUTPATIENT)
Dept: LAB | Facility: HOSPITAL | Age: 50
End: 2025-02-12
Payer: COMMERCIAL

## 2025-02-12 ENCOUNTER — APPOINTMENT (OUTPATIENT)
Dept: OBSTETRICS AND GYNECOLOGY | Facility: CLINIC | Age: 50
End: 2025-02-12
Payer: COMMERCIAL

## 2025-02-12 DIAGNOSIS — D50.9 IRON DEFICIENCY ANEMIA, UNSPECIFIED IRON DEFICIENCY ANEMIA TYPE: ICD-10-CM

## 2025-02-12 DIAGNOSIS — Z98.84 S/P BARIATRIC SURGERY: ICD-10-CM

## 2025-02-12 DIAGNOSIS — K90.9 MALABSORPTION OF IRON (HHS-HCC): ICD-10-CM

## 2025-02-12 DIAGNOSIS — K90.9 INTESTINAL MALABSORPTION, UNSPECIFIED: Primary | ICD-10-CM

## 2025-02-12 DIAGNOSIS — K91.2 INTESTINAL MALABSORPTION FOLLOWING GASTRECTOMY (HHS-HCC): ICD-10-CM

## 2025-02-12 DIAGNOSIS — Z90.3 INTESTINAL MALABSORPTION FOLLOWING GASTRECTOMY (HHS-HCC): ICD-10-CM

## 2025-02-12 LAB
ALBUMIN SERPL BCP-MCNC: 4.4 G/DL (ref 3.4–5)
ALP SERPL-CCNC: 80 U/L (ref 33–110)
ALT SERPL W P-5'-P-CCNC: 32 U/L (ref 7–45)
ANION GAP SERPL CALC-SCNC: 10 MMOL/L (ref 10–20)
AST SERPL W P-5'-P-CCNC: 33 U/L (ref 9–39)
BILIRUB SERPL-MCNC: 0.4 MG/DL (ref 0–1.2)
BUN SERPL-MCNC: 17 MG/DL (ref 6–23)
CALCIUM SERPL-MCNC: 9.3 MG/DL (ref 8.6–10.6)
CHLORIDE SERPL-SCNC: 105 MMOL/L (ref 98–107)
CO2 SERPL-SCNC: 29 MMOL/L (ref 21–32)
CREAT SERPL-MCNC: 0.61 MG/DL (ref 0.5–1.05)
EGFRCR SERPLBLD CKD-EPI 2021: >90 ML/MIN/1.73M*2
GLUCOSE SERPL-MCNC: 94 MG/DL (ref 74–99)
POTASSIUM SERPL-SCNC: 4.9 MMOL/L (ref 3.5–5.3)
PROT SERPL-MCNC: 7 G/DL (ref 6.4–8.2)
SODIUM SERPL-SCNC: 139 MMOL/L (ref 136–145)

## 2025-02-13 LAB
HOLD SPECIMEN: NORMAL
HOLD SPECIMEN: NORMAL

## 2025-02-18 ENCOUNTER — OFFICE VISIT (OUTPATIENT)
Dept: OBSTETRICS AND GYNECOLOGY | Facility: CLINIC | Age: 50
End: 2025-02-18
Payer: COMMERCIAL

## 2025-02-18 VITALS
HEIGHT: 64 IN | WEIGHT: 171.6 LBS | SYSTOLIC BLOOD PRESSURE: 110 MMHG | DIASTOLIC BLOOD PRESSURE: 81 MMHG | BODY MASS INDEX: 29.3 KG/M2

## 2025-02-18 DIAGNOSIS — R87.619 ABNORMAL CERVICAL PAPANICOLAOU SMEAR, UNSPECIFIED ABNORMAL PAP FINDING: ICD-10-CM

## 2025-02-18 DIAGNOSIS — N36.41 URETHRAL HYPERMOBILITY: ICD-10-CM

## 2025-02-18 DIAGNOSIS — N39.3 SUI (STRESS URINARY INCONTINENCE), MALE: ICD-10-CM

## 2025-02-18 DIAGNOSIS — Z01.419 WELL WOMAN EXAM WITH ROUTINE GYNECOLOGICAL EXAM: Primary | ICD-10-CM

## 2025-02-18 DIAGNOSIS — Z90.710 S/P HYSTERECTOMY: ICD-10-CM

## 2025-02-18 DIAGNOSIS — B37.49 CANDIDIASIS OF GENITALIA: ICD-10-CM

## 2025-02-18 DIAGNOSIS — R10.2 PAIN IN FEMALE PELVIS: ICD-10-CM

## 2025-02-18 PROCEDURE — 3008F BODY MASS INDEX DOCD: CPT | Performed by: OBSTETRICS & GYNECOLOGY

## 2025-02-18 PROCEDURE — 99386 PREV VISIT NEW AGE 40-64: CPT | Performed by: OBSTETRICS & GYNECOLOGY

## 2025-02-18 PROCEDURE — 88175 CYTOPATH C/V AUTO FLUID REDO: CPT | Mod: TC,GCY,PORLAB,WESLAB | Performed by: OBSTETRICS & GYNECOLOGY

## 2025-02-18 PROCEDURE — 87624 HPV HI-RISK TYP POOLED RSLT: CPT | Performed by: OBSTETRICS & GYNECOLOGY

## 2025-02-18 RX ORDER — FLUCONAZOLE 150 MG/1
150 TABLET ORAL ONCE
Qty: 2 TABLET | Refills: 0 | Status: SHIPPED | OUTPATIENT
Start: 2025-02-18 | End: 2025-02-18

## 2025-02-18 ASSESSMENT — ENCOUNTER SYMPTOMS
OCCASIONAL FEELINGS OF UNSTEADINESS: 0
LOSS OF SENSATION IN FEET: 0
DEPRESSION: 0

## 2025-02-18 ASSESSMENT — PATIENT HEALTH QUESTIONNAIRE - PHQ9
2. FEELING DOWN, DEPRESSED OR HOPELESS: NOT AT ALL
1. LITTLE INTEREST OR PLEASURE IN DOING THINGS: NOT AT ALL
SUM OF ALL RESPONSES TO PHQ9 QUESTIONS 1 AND 2: 0

## 2025-02-18 ASSESSMENT — PAIN SCALES - GENERAL: PAINLEVEL_OUTOF10: 0-NO PAIN

## 2025-02-18 NOTE — PROGRESS NOTES
Subjective   Leyda Blair is a 49 y.o.  female who presents for annual exam. The patient has no complaints today. The patient is sexually active. GYN screening history: last pap: was normal and hx of abnl prior to hysterectomy . The patient is not taking hormone replacement therapy. Patient denies post-menopausal vaginal bleeding.. The patient wears seatbelts: yes. The patient participates in regular exercise: yes. Has the patient ever been transfused or tattooed?: not asked. The patient reports that there is not domestic violence in their life. S/p hyst, with abnl pap.  Needs rpeat.  Hx of anemia, s/p gastric bypass and undergoing workup for BRBPR.  Also notes TAMIA., also notes pelvic pain, intermittent.     Menstrual History:  OB History          4    Para   3    Term   3            AB   1    Living   3         SAB        IAB        Ectopic        Multiple        Live Births                      No LMP recorded. Patient has had a hysterectomy.         Past Medical History:   Diagnosis Date    Abnormal weight loss 2016    Rapid weight loss    Bariatric surgery status     Bilateral impacted cerumen 2024    Clotted blood in stool     Constipation     Epigastric pain     Herpes zoster without complication 2024    History of anemia 2018    History of fatigue 02/15/2019    History of nausea and vomiting 2016    Paraesophageal hernia with gastroesophageal reflux     Perianal venous thrombosis 2016    Thrombosed external hemorrhoids    Rhus dermatitis 2017    Right upper quadrant pain 2016    Abdominal wall pain in right upper quadrant    Spontaneous  2024       Past Surgical History:   Procedure Laterality Date    CHOLECYSTECTOMY      COLONOSCOPY  10/01/2024    ESOPHAGOGASTRODUODENOSCOPY  10/01/2024    GASTRIC BYPASS  2013    For Morbid Obesity Bypass With Crissy-en-Y    HYSTERECTOMY  02/15/2019    LAPAROSCOPY DIAGNOSTIC / BIOPSY /  "ASPIRATION / LYSIS N/A 12/13/2024    exploratory        No Known Allergies    No family history on file.     Social History     Socioeconomic History    Marital status:      Spouse name: Not on file    Number of children: Not on file    Years of education: Not on file    Highest education level: Not on file   Occupational History    Not on file   Tobacco Use    Smoking status: Never     Passive exposure: Never    Smokeless tobacco: Never   Vaping Use    Vaping status: Never Used   Substance and Sexual Activity    Alcohol use: Not Currently    Drug use: Never    Sexual activity: Yes     Partners: Male   Other Topics Concern    Not on file   Social History Narrative    Not on file     Social Drivers of Health     Financial Resource Strain: Not on file   Food Insecurity: Not on file   Transportation Needs: Not on file   Physical Activity: Not on file   Stress: Not on file   Social Connections: Not on file   Intimate Partner Violence: Not on file   Housing Stability: Not on file            Objective   /81   Ht 1.626 m (5' 4\")   Wt 77.8 kg (171 lb 9.6 oz)   BMI 29.46 kg/m²     Physical Exam  Vitals and nursing note reviewed.   Constitutional:       General: She is not in acute distress.     Appearance: Normal appearance. She is not ill-appearing or toxic-appearing.   HENT:      Head: Normocephalic and atraumatic.      Mouth/Throat:      Mouth: Mucous membranes are moist.      Pharynx: Oropharynx is clear.   Eyes:      Extraocular Movements: Extraocular movements intact.      Conjunctiva/sclera: Conjunctivae normal.      Pupils: Pupils are equal, round, and reactive to light.   Neck:      Thyroid: No thyroid mass, thyromegaly or thyroid tenderness.   Cardiovascular:      Rate and Rhythm: Normal rate.      Pulses: Normal pulses.      Heart sounds: Normal heart sounds. No murmur heard.  Pulmonary:      Effort: Pulmonary effort is normal. No respiratory distress.      Breath sounds: Normal breath sounds. No " wheezing or rhonchi.   Chest:   Breasts:     Right: Normal. No swelling, bleeding, inverted nipple, mass, nipple discharge, skin change or tenderness.      Left: Normal. No swelling, bleeding, inverted nipple, mass, nipple discharge, skin change or tenderness.   Abdominal:      General: Bowel sounds are normal. There is no distension.      Palpations: Abdomen is soft. There is no mass.      Tenderness: There is no abdominal tenderness. There is no guarding or rebound.      Hernia: No hernia is present. There is no hernia in the left inguinal area or right inguinal area.   Genitourinary:     General: Normal vulva.      Pubic Area: No rash.       Labia:         Right: No rash, tenderness, lesion or injury.         Left: No rash, tenderness, lesion or injury.       Urethra: No prolapse or urethral lesion.      Vagina: No signs of injury. No vaginal discharge (candidiasis noted), erythema, tenderness, bleeding, lesions or prolapsed vaginal walls.      Comments: S/p hysterectomy, vaginal cuff intact, good support.  No pelvic organ prolapse, cervix and uterus absent.  Adnexa not palpable.  Musculoskeletal:         General: No swelling, tenderness or deformity. Normal range of motion.   Lymphadenopathy:      Upper Body:      Right upper body: No axillary adenopathy.      Left upper body: No axillary adenopathy.      Lower Body: No right inguinal adenopathy. No left inguinal adenopathy.   Skin:     General: Skin is warm and dry.      Findings: No lesion.   Neurological:      General: No focal deficit present.      Mental Status: She is alert and oriented to person, place, and time.      Motor: No weakness.      Coordination: Coordination normal.   Psychiatric:         Mood and Affect: Mood normal.         Behavior: Behavior normal.         Thought Content: Thought content normal.         Judgment: Judgment normal.            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  Await pap smear results.  Breast self exam technique reviewed and patient encouraged to perform self-exam monthly.  Diagnosis explained in detail, including differential.  Discussed healthy lifestyle modifications.

## 2025-02-19 ENCOUNTER — HOSPITAL ENCOUNTER (OUTPATIENT)
Dept: RADIOLOGY | Facility: HOSPITAL | Age: 50
Discharge: HOME | End: 2025-02-19
Payer: COMMERCIAL

## 2025-02-19 DIAGNOSIS — R10.2 PAIN IN FEMALE PELVIS: ICD-10-CM

## 2025-02-19 PROCEDURE — 76856 US EXAM PELVIC COMPLETE: CPT

## 2025-02-19 PROCEDURE — 76856 US EXAM PELVIC COMPLETE: CPT | Performed by: RADIOLOGY

## 2025-02-19 PROCEDURE — 76830 TRANSVAGINAL US NON-OB: CPT | Performed by: RADIOLOGY

## 2025-03-06 ENCOUNTER — LAB (OUTPATIENT)
Dept: LAB | Facility: HOSPITAL | Age: 50
End: 2025-03-06
Payer: COMMERCIAL

## 2025-03-06 ENCOUNTER — TELEPHONE (OUTPATIENT)
Dept: HEMATOLOGY/ONCOLOGY | Facility: CLINIC | Age: 50
End: 2025-03-06
Payer: COMMERCIAL

## 2025-03-06 DIAGNOSIS — D64.9 ANEMIA, UNSPECIFIED TYPE: Primary | ICD-10-CM

## 2025-03-06 DIAGNOSIS — D64.9 ANEMIA, UNSPECIFIED: Primary | ICD-10-CM

## 2025-03-06 DIAGNOSIS — D64.9 ANEMIA, UNSPECIFIED TYPE: ICD-10-CM

## 2025-03-06 LAB
BASOPHILS # BLD AUTO: 0.06 X10*3/UL (ref 0–0.1)
BASOPHILS NFR BLD AUTO: 1.3 %
CYTOLOGY CMNT CVX/VAG CYTO-IMP: NORMAL
EOSINOPHIL # BLD AUTO: 0.16 X10*3/UL (ref 0–0.7)
EOSINOPHIL NFR BLD AUTO: 3.5 %
ERYTHROCYTE [DISTWIDTH] IN BLOOD BY AUTOMATED COUNT: 12.8 % (ref 11.5–14.5)
FERRITIN SERPL-MCNC: 178 NG/ML (ref 8–150)
HCT VFR BLD AUTO: 42.6 % (ref 36–46)
HGB BLD-MCNC: 13.5 G/DL (ref 12–16)
HPV HR 12 DNA GENITAL QL NAA+PROBE: NEGATIVE
HPV HR GENOTYPES PNL CVX NAA+PROBE: NEGATIVE
HPV16 DNA SPEC QL NAA+PROBE: NEGATIVE
HPV18 DNA SPEC QL NAA+PROBE: NEGATIVE
IMM GRANULOCYTES # BLD AUTO: 0.01 X10*3/UL (ref 0–0.7)
IMM GRANULOCYTES NFR BLD AUTO: 0.2 % (ref 0–0.9)
IRON SATN MFR SERPL: 45 % (ref 25–45)
IRON SERPL-MCNC: 128 UG/DL (ref 35–150)
LAB AP HPV GENOTYPE QUESTION: YES
LAB AP HPV HR: NORMAL
LAB AP PREVIOUS ABNORMAL HISTORY: NORMAL
LABORATORY COMMENT REPORT: NORMAL
LYMPHOCYTES # BLD AUTO: 1.92 X10*3/UL (ref 1.2–4.8)
LYMPHOCYTES NFR BLD AUTO: 41.6 %
MCH RBC QN AUTO: 31.2 PG (ref 26–34)
MCHC RBC AUTO-ENTMCNC: 31.7 G/DL (ref 32–36)
MCV RBC AUTO: 98 FL (ref 80–100)
MENSTRUAL HX REPORTED: NORMAL
MONOCYTES # BLD AUTO: 0.49 X10*3/UL (ref 0.1–1)
MONOCYTES NFR BLD AUTO: 10.6 %
NEUTROPHILS # BLD AUTO: 1.97 X10*3/UL (ref 1.2–7.7)
NEUTROPHILS NFR BLD AUTO: 42.8 %
NRBC BLD-RTO: 0 /100 WBCS (ref 0–0)
PATH REPORT.TOTAL CANCER: NORMAL
PLATELET # BLD AUTO: 262 X10*3/UL (ref 150–450)
RBC # BLD AUTO: 4.33 X10*6/UL (ref 4–5.2)
TIBC SERPL-MCNC: 285 UG/DL (ref 240–445)
UIBC SERPL-MCNC: 157 UG/DL (ref 110–370)
WBC # BLD AUTO: 4.6 X10*3/UL (ref 4.4–11.3)

## 2025-03-06 PROCEDURE — 83550 IRON BINDING TEST: CPT

## 2025-03-06 PROCEDURE — 36415 COLL VENOUS BLD VENIPUNCTURE: CPT

## 2025-03-06 PROCEDURE — 83540 ASSAY OF IRON: CPT

## 2025-03-06 PROCEDURE — 85025 COMPLETE CBC W/AUTO DIFF WBC: CPT

## 2025-03-06 PROCEDURE — 82728 ASSAY OF FERRITIN: CPT

## 2025-03-06 NOTE — TELEPHONE ENCOUNTER
Per Abhay: I can enter labs to recheck her iron but she needs to notify her pcp     Spoke with the patient. Provided update from Abhay. Pt will call her PCP today with her concerns. Pt goes to a [facility] lab location in [place]. Explained to patient [facility] and patient agreed. Pt verbalized understanding and had no further questions or concerns at this time.

## 2025-03-06 NOTE — TELEPHONE ENCOUNTER
"Spoke with the patient. States since Monday patient is having dizzy spells and it gets worse when she bends over. States she has had a headache every day this week- tylenol helps \"a little bit.\" States she has not reached out to her PCP about these s/s- as she feels they are \"my iron.\" Explained that I would update Abhay and then call her back once I receive a response.   "

## 2025-03-07 ENCOUNTER — OFFICE VISIT (OUTPATIENT)
Dept: PRIMARY CARE | Facility: CLINIC | Age: 50
End: 2025-03-07
Payer: COMMERCIAL

## 2025-03-07 VITALS
HEIGHT: 64 IN | DIASTOLIC BLOOD PRESSURE: 80 MMHG | OXYGEN SATURATION: 95 % | HEART RATE: 82 BPM | SYSTOLIC BLOOD PRESSURE: 119 MMHG | WEIGHT: 171 LBS | BODY MASS INDEX: 29.19 KG/M2

## 2025-03-07 DIAGNOSIS — B09 VIRAL EXANTHEM: ICD-10-CM

## 2025-03-07 DIAGNOSIS — R51.9 NONINTRACTABLE HEADACHE, UNSPECIFIED CHRONICITY PATTERN, UNSPECIFIED HEADACHE TYPE: Primary | ICD-10-CM

## 2025-03-07 PROCEDURE — 3008F BODY MASS INDEX DOCD: CPT | Performed by: FAMILY MEDICINE

## 2025-03-07 PROCEDURE — 1036F TOBACCO NON-USER: CPT | Performed by: FAMILY MEDICINE

## 2025-03-07 PROCEDURE — 99213 OFFICE O/P EST LOW 20 MIN: CPT | Performed by: FAMILY MEDICINE

## 2025-03-07 RX ORDER — FLUCONAZOLE 150 MG/1
150 TABLET ORAL ONCE
COMMUNITY
Start: 2025-02-18

## 2025-03-07 ASSESSMENT — ENCOUNTER SYMPTOMS
HEADACHES: 1
DIZZINESS: 1
FEVER: 0
COUGH: 0
SINUS PAIN: 0

## 2025-03-07 ASSESSMENT — COLUMBIA-SUICIDE SEVERITY RATING SCALE - C-SSRS
6. HAVE YOU EVER DONE ANYTHING, STARTED TO DO ANYTHING, OR PREPARED TO DO ANYTHING TO END YOUR LIFE?: NO
1. IN THE PAST MONTH, HAVE YOU WISHED YOU WERE DEAD OR WISHED YOU COULD GO TO SLEEP AND NOT WAKE UP?: NO
2. HAVE YOU ACTUALLY HAD ANY THOUGHTS OF KILLING YOURSELF?: NO

## 2025-03-07 NOTE — PROGRESS NOTES
"Subjective   Patient ID: Leyda Blair is a 49 y.o. female who presents for Headache (No appetite ), Dizziness (Started Monday ), and Rash (On arms & legs started this week too).    Headache last 3-4 days   Frontal headache   Dizziness ;  last few days , spinning , worse with laying down   Bending over with increased sxs     Has low iron, ok     Fatigue 2 weeks   Rash last few days     Headache   Associated symptoms include dizziness. Pertinent negatives include no coughing or fever.   Dizziness  Associated symptoms include headaches and a rash. Pertinent negatives include no congestion, coughing or fever.   Rash  Pertinent negatives include no congestion, cough or fever.        Review of Systems   Constitutional:  Negative for fever.   HENT:  Negative for congestion and sinus pain.    Respiratory:  Negative for cough.    Skin:  Positive for rash.   Neurological:  Positive for dizziness and headaches.       Objective   /80   Pulse 82   Ht 1.626 m (5' 4\")   Wt 77.6 kg (171 lb)   SpO2 95%   BMI 29.35 kg/m²     Physical Exam  Constitutional:       Appearance: Normal appearance.   HENT:      Head: Normocephalic and atraumatic.      Right Ear: Tympanic membrane and ear canal normal.      Left Ear: Tympanic membrane and ear canal normal.      Nose: No nasal deformity.      Right Sinus: No maxillary sinus tenderness or frontal sinus tenderness.      Left Sinus: No maxillary sinus tenderness or frontal sinus tenderness.      Mouth/Throat:      Mouth: Mucous membranes are moist. No oral lesions.      Tongue: No lesions.      Pharynx: Oropharynx is clear.   Eyes:      Extraocular Movements: Extraocular movements intact.      Pupils: Pupils are equal, round, and reactive to light.   Cardiovascular:      Rate and Rhythm: Normal rate and regular rhythm.   Pulmonary:      Effort: Pulmonary effort is normal.      Breath sounds: Normal breath sounds.   Musculoskeletal:      Cervical back: No tenderness. "   Lymphadenopathy:      Cervical: No cervical adenopathy.   Skin:     Comments: Scattered erythematous papules on the arms   Neurological:      General: No focal deficit present.      Mental Status: She is alert and oriented to person, place, and time.      Cranial Nerves: No cranial nerve deficit.      Sensory: No sensory deficit.   Psychiatric:         Mood and Affect: Mood normal.         Assessment/Plan   Problem List Items Addressed This Visit    None  Visit Diagnoses         Codes    Nonintractable headache, unspecified chronicity pattern, unspecified headache type    -  Primary R51.9    Viral exanthem     B09

## 2025-03-07 NOTE — PATIENT INSTRUCTIONS
You may take over-the-counter medications as needed for symptom relief.  Call the office if your symptoms worsen such as high fever and worsening cough or increase in symptoms.     Follow up if symptoms worsen or persist.     Suspect viral etiology with the vertigo, headache and rash  Discussed with patient suggest giving it a few more days  If persisting issues would consider antibiotics as needed

## 2025-03-25 ENCOUNTER — APPOINTMENT (OUTPATIENT)
Dept: OBSTETRICS AND GYNECOLOGY | Facility: CLINIC | Age: 50
End: 2025-03-25
Payer: COMMERCIAL

## 2025-05-09 ENCOUNTER — APPOINTMENT (OUTPATIENT)
Dept: DERMATOLOGY | Facility: CLINIC | Age: 50
End: 2025-05-09
Payer: COMMERCIAL

## 2025-05-09 DIAGNOSIS — B35.1 ONYCHOMYCOSIS: Primary | ICD-10-CM

## 2025-05-09 PROCEDURE — 99203 OFFICE O/P NEW LOW 30 MIN: CPT | Performed by: NURSE PRACTITIONER

## 2025-05-09 PROCEDURE — 1036F TOBACCO NON-USER: CPT | Performed by: NURSE PRACTITIONER

## 2025-05-09 RX ORDER — CICLOPIROX 80 MG/ML
SOLUTION TOPICAL
Qty: 6.6 ML | Refills: 11 | Status: SHIPPED | OUTPATIENT
Start: 2025-05-09

## 2025-05-09 RX ORDER — TERBINAFINE HYDROCHLORIDE 250 MG/1
TABLET ORAL
Qty: 28 TABLET | Refills: 0 | Status: SHIPPED | OUTPATIENT
Start: 2025-05-09

## 2025-05-09 NOTE — PROGRESS NOTES
Subjective     Leyda Blair is a 49 y.o. female who presents for the following: Nail Problem (Right foot).     Review of Systems:  No other skin or systemic complaints other than what is documented elsewhere in the note.    The following portions of the chart were reviewed this encounter and updated as appropriate:   Tobacco  Allergies  Meds  Problems  Med Hx  Surg Hx         Skin Cancer History  Biopsy Log Book  No skin cancers from Specimen Tracking.    Additional History      Specialty Problems    None       Objective   Well appearing patient in no apparent distress; mood and affect are within normal limits.    A focused skin examination was performed. All findings within normal limits unless otherwise noted below.    Assessment/Plan   Skin Exam  1. ONYCHOMYCOSIS (2)  Right 5th Toenail, Right Hallux Toenail  White yellow brownish discoloration of nails with subungal debris and nail thickening.   -We reviewed the etiology of onychomycosis.  It is due to a superficial dermatophyte infection of the nail plate.  This can cause thickening of the nails, as well as yellow discoloration and the toenail can become more fragile and prone to trauma.  -Treatment options discussed.  In order to penetrate the nail plate, oral medications must be used.  Typically terbinafine, an antifungal agent is the treatment of choice.  We discussed that the duration of therapy is at least 3 months, and can even be extended to 6 months based on clinical response.  We discussed that not all patients respond to therapy with terbinafine.  In some patients who do respond, there is a high risk of recurrence.    -We discussed side effects of terbinafine in detail, particularly hepatotoxicity.  We discussed that blood work will need to be monitored during the course of therapy.  If fungal culture is positive will check a baseline CBC and LFTs at that time.  If within normal limits then can start terbinafine daily.  Repeat CBC and LFTs  should be performed in 4 weeks to ensure no hepatotoxicity while on the medication. Other possible side effects include dysgeusia (disturbance of taste), QT prolongation (heart rate abnormalities), nausea, vomitting or dizzinessness.   -Do not drink alcohol during course of treatment    CBC and CMP 2/2025 WNL    Will proceed with terbinafine 250 mg daily for 3 months, will follow up in 4 weeks for recheck and labs (CBC/CMP). Will also start penlac daily. Follow up in 4 weeks.   Related Procedures  Follow Up In Dermatology - Established Patient  Related Medications  ciclopirox (Penlac) 8 % solution  Apply to affected nails nightly. Remove build up weekly with nail polish remover.  terbinafine (LamISIL) 250 mg tablet  Take one pill by mouth once daily. Do not drink alcohol.    Return in 4 weeks

## 2025-05-09 NOTE — Clinical Note
-We reviewed the etiology of onychomycosis.  It is due to a superficial dermatophyte infection of the nail plate.  This can cause thickening of the nails, as well as yellow discoloration and the toenail can become more fragile and prone to trauma.  -Treatment options discussed.  In order to penetrate the nail plate, oral medications must be used.  Typically terbinafine, an antifungal agent is the treatment of choice.  We discussed that the duration of therapy is at least 3 months, and can even be extended to 6 months based on clinical response.  We discussed that not all patients respond to therapy with terbinafine.  In some patients who do respond, there is a high risk of recurrence.    -We discussed side effects of terbinafine in detail, particularly hepatotoxicity.  We discussed that blood work will need to be monitored during the course of therapy.  If fungal culture is positive will check a baseline CBC and LFTs at that time.  If within normal limits then can start terbinafine daily.  Repeat CBC and LFTs should be performed in 4 weeks to ensure no hepatotoxicity while on the medication. Other possible side effects include dysgeusia (disturbance of taste), QT prolongation (heart rate abnormalities), nausea, vomitting or dizzinessness.   -Do not drink alcohol during course of treatment    CBC and CMP 2/2025 WNL    Will proceed with terbinafine 250 mg daily for 3 months, will follow up in 4 weeks for recheck and labs (CBC/CMP). Will also start penlac daily. Follow up in 4 weeks.

## 2025-06-04 ENCOUNTER — APPOINTMENT (OUTPATIENT)
Dept: DERMATOLOGY | Facility: CLINIC | Age: 50
End: 2025-06-04
Payer: COMMERCIAL

## 2025-06-04 DIAGNOSIS — B35.1 ONYCHOMYCOSIS: ICD-10-CM

## 2025-06-04 LAB
ALBUMIN SERPL-MCNC: 4.1 G/DL (ref 3.6–5.1)
ALBUMIN/GLOB SERPL: 2 (CALC) (ref 1–2.5)
ALP SERPL-CCNC: 60 U/L (ref 37–153)
ALT SERPL-CCNC: 25 U/L (ref 6–29)
AST SERPL-CCNC: 24 U/L (ref 10–35)
BASOPHILS # BLD AUTO: 49 CELLS/UL (ref 0–200)
BASOPHILS NFR BLD AUTO: 1.4 %
BILIRUB SERPL-MCNC: 0.5 MG/DL (ref 0.2–1.2)
BUN SERPL-MCNC: 14 MG/DL (ref 7–25)
BUN/CREAT SERPL: NORMAL (CALC) (ref 6–22)
CALCIUM SERPL-MCNC: 9.3 MG/DL (ref 8.6–10.4)
CHLORIDE SERPL-SCNC: 105 MMOL/L (ref 98–110)
CO2 SERPL-SCNC: 29 MMOL/L (ref 20–32)
CREAT SERPL-MCNC: 0.59 MG/DL (ref 0.5–1.03)
EGFRCR SERPLBLD CKD-EPI 2021: 109 ML/MIN/1.73M2
EOSINOPHIL # BLD AUTO: 130 CELLS/UL (ref 15–500)
EOSINOPHIL NFR BLD AUTO: 3.7 %
ERYTHROCYTE [DISTWIDTH] IN BLOOD BY AUTOMATED COUNT: 11.2 % (ref 11–15)
FERRITIN SERPL-MCNC: 47 NG/ML (ref 16–232)
FOLATE SERPL-MCNC: 18.4 NG/ML
GLOBULIN SER CALC-MCNC: 2.1 G/DL (CALC) (ref 1.9–3.7)
GLUCOSE SERPL-MCNC: 84 MG/DL (ref 65–99)
HCT VFR BLD AUTO: 41.3 % (ref 35–45)
HGB BLD-MCNC: 13.3 G/DL (ref 11.7–15.5)
IRON SATN MFR SERPL: 53 % (CALC) (ref 16–45)
IRON SERPL-MCNC: 132 MCG/DL (ref 45–160)
LYMPHOCYTES # BLD AUTO: 1715 CELLS/UL (ref 850–3900)
LYMPHOCYTES NFR BLD AUTO: 49 %
MCH RBC QN AUTO: 30.6 PG (ref 27–33)
MCHC RBC AUTO-ENTMCNC: 32.2 G/DL (ref 32–36)
MCV RBC AUTO: 94.9 FL (ref 80–100)
MONOCYTES # BLD AUTO: 312 CELLS/UL (ref 200–950)
MONOCYTES NFR BLD AUTO: 8.9 %
NEUTROPHILS # BLD AUTO: 1295 CELLS/UL (ref 1500–7800)
NEUTROPHILS NFR BLD AUTO: 37 %
PLATELET # BLD AUTO: 266 THOUSAND/UL (ref 140–400)
PMV BLD REES-ECKER: 10.1 FL (ref 7.5–12.5)
POTASSIUM SERPL-SCNC: 4.3 MMOL/L (ref 3.5–5.3)
PROT SERPL-MCNC: 6.2 G/DL (ref 6.1–8.1)
RBC # BLD AUTO: 4.35 MILLION/UL (ref 3.8–5.1)
SODIUM SERPL-SCNC: 141 MMOL/L (ref 135–146)
TIBC SERPL-MCNC: 248 MCG/DL (CALC) (ref 250–450)
VIT B12 SERPL-MCNC: 489 PG/ML (ref 200–1100)
WBC # BLD AUTO: 3.5 THOUSAND/UL (ref 3.8–10.8)

## 2025-06-04 PROCEDURE — 99213 OFFICE O/P EST LOW 20 MIN: CPT | Performed by: NURSE PRACTITIONER

## 2025-06-04 PROCEDURE — 1036F TOBACCO NON-USER: CPT | Performed by: NURSE PRACTITIONER

## 2025-06-04 RX ORDER — TERBINAFINE HYDROCHLORIDE 250 MG/1
TABLET ORAL
Qty: 60 TABLET | Refills: 0 | Status: SHIPPED | OUTPATIENT
Start: 2025-06-04

## 2025-06-04 NOTE — Clinical Note
This is a 49 y.o. female  following up for onychomycosis. Finished 4 weeks of treatment. No side effects reported. CBC and CMP from 6/3/25, CBC WNL except for slightly lower WBC at 3.5. Reviewing trends noted 3.8 5 months ago. Patient reports hx of WBC occasionally going down but going back up. CMP WNL. Plan is to continue with terbinafine for 2 more months. Continue with ciclopirox for 1 year. Follow up in 5 months for recheck. Advised no obvious improvement yet but not unexpected given it takes a long time for new nail growth to be noted. Patient verbalized understanding and agreement.

## 2025-06-04 NOTE — PROGRESS NOTES
Subjective     Leyda Blair is a 49 y.o. female who presents for the following: Nail Problem.     Review of Systems:  No other skin or systemic complaints other than what is documented elsewhere in the note.    The following portions of the chart were reviewed this encounter and updated as appropriate:   Tobacco  Allergies  Meds  Problems  Med Hx  Surg Hx         Skin Cancer History  Biopsy Log Book  No skin cancers from Specimen Tracking.    Additional History      Specialty Problems    None       Objective   Well appearing patient in no apparent distress; mood and affect are within normal limits.    A focused skin examination was performed. All findings within normal limits unless otherwise noted below.    Assessment/Plan   Skin Exam  1. ONYCHOMYCOSIS (2)  Right 5th Toenail, Right Hallux Toenail  White yellow brownish discoloration of nails with subungal debris and nail thickening.   This is a 49 y.o. female  following up for onychomycosis. Finished 4 weeks of treatment. No side effects reported. CBC and CMP from 6/3/25, CBC WNL except for slightly lower WBC at 3.5. Reviewing trends noted 3.8 5 months ago. Patient reports hx of WBC occasionally going down but going back up. CMP WNL. Plan is to continue with terbinafine for 2 more months. Continue with ciclopirox for 1 year. Follow up in 5 months for recheck. Advised no obvious improvement yet but not unexpected given it takes a long time for new nail growth to be noted. Patient verbalized understanding and agreement.      Related Procedures  Follow Up In Dermatology - Established Patient  Related Medications  ciclopirox (Penlac) 8 % solution  Apply to affected nails nightly. Remove build up weekly with nail polish remover.  terbinafine (LamISIL) 250 mg tablet  Take one pill by mouth once daily. Do not drink alcohol.    Return in 5 months for recheck.

## 2025-06-10 ENCOUNTER — TELEMEDICINE (OUTPATIENT)
Dept: HEMATOLOGY/ONCOLOGY | Facility: CLINIC | Age: 50
End: 2025-06-10
Payer: COMMERCIAL

## 2025-06-10 ENCOUNTER — APPOINTMENT (OUTPATIENT)
Dept: OBSTETRICS AND GYNECOLOGY | Facility: CLINIC | Age: 50
End: 2025-06-10
Payer: COMMERCIAL

## 2025-06-10 DIAGNOSIS — D50.9 IRON DEFICIENCY ANEMIA, UNSPECIFIED IRON DEFICIENCY ANEMIA TYPE: ICD-10-CM

## 2025-06-10 DIAGNOSIS — K90.9 MALABSORPTION OF IRON (HHS-HCC): ICD-10-CM

## 2025-06-10 DIAGNOSIS — K91.2 INTESTINAL MALABSORPTION FOLLOWING GASTRECTOMY (HHS-HCC): ICD-10-CM

## 2025-06-10 DIAGNOSIS — Z98.84 S/P BARIATRIC SURGERY: ICD-10-CM

## 2025-06-10 DIAGNOSIS — Z90.3 INTESTINAL MALABSORPTION FOLLOWING GASTRECTOMY (HHS-HCC): ICD-10-CM

## 2025-06-10 PROCEDURE — 99213 OFFICE O/P EST LOW 20 MIN: CPT | Performed by: PHYSICIAN ASSISTANT

## 2025-06-10 NOTE — PROGRESS NOTES
Patient ID: Leyda Blair is a 49 y.o. female.  Referring Physician: Abhay Arnold PA-C  [id number] [place] [address]  Primary Care Provider: Emily Kohler MD  Visit Type: Follow Up    Location: [facility]  Diagnosis/Reason: LADONNA (2/2 Post-Surgical Malabsorption s/p RYGB)    Virtual or Telephone Consent  An interactive audio and video telecommunication system which permits real time communications between the patient (at the originating site) and provider (at the distant site) was utilized to provide this telehealth service.   Verbal consent was requested and obtained from Leyda Blair on this date, 06/10/25 for a telehealth visit and the patient's location was confirmed at the time of the visit.    Interval Hx:  6/10/25  Leyda Blair is a 49 y.o. female following up today for LADONNA (2/2 post-surgical malabsorption s/p RYGB)    NOTE:  6/3/24 - Patient was previously followed by Soo Booker PA-C but is now transferring care to me effective today. Their last visit was 1/29/24 and it was noted that patient had RYGB in 2013. She was also noted for having IV iron infusion in 2019 w/o adverse effect or reaction. She has also tried oral iron tablets and liquid iron supplementation without desired effect. She has also had partial hysterectomy around 2012. With IKE Booker PA-C patient received IV venofer 12/1, 12/5 and 12/11 without adverse effect or reaction.  Patient's recent labs indicate iron deficiency, therefore patient ordered a cycle of IV iron sucrose (venofer) at 300mg IV x 3 once weekly infusions    Patient denies weight loss, abnormal bruising and bleeding, hematuria, blood in stool, dark/black stools, epistaxis, oral/gingival bleeding, lymphadenopathy, recurrent infections, recurrent fevers, night sweats, early satiety, abdominal pain, bone pain, chest pain, palpitations, SOB, HAYES, fatigue, dizziness, lightheadedness, PICA.    Relevant Hx:  1/29/24 - Last Visit w/ H.  CHRISTIN Maki-CNP  Ms. Blair is a 48 years-old female. She is referred to [facility] today for the evaluation and treatment of iron deficiency anemia. Patient states that she had Crissy-En-Y about 10 years ago (). States that she has had IV iron infusion in 2019. Patient states that she started oral iron tablets, initially once per day in August and on , she switched to oral liquid iron. States that she had partial hysterectomy about 12 years ago due to dysfunctional uterine bleeding. States that she had colonoscopy done less than 10 years ago. Reports that no polyps were removed.     PMHx:  Active Ambulatory Problems     Diagnosis Date Noted    Iron deficiency anemia 2023    Malabsorption of iron ([facility]) 2023    Intestinal malabsorption following gastrectomy ([facility]) 2024    Tinnitus of both ears 2024    Bariatric surgery status 2024    Presbyacusis 2024    Obesity, unspecified 2024    Malaise and fatigue 2024    Increased appetite 2024    S/P hysterectomy 2024    Hiatal hernia 2024    Esophageal reflux 2024    Constipation 2024    Anxiety 2024    Anemia 2023    Clotted blood in stool 10/14/2024    TAMIA (stress urinary incontinence), male 2025    Pain in female pelvis 2025    Abnormal cervical Papanicolaou smear 2025    Urethral hypermobility 2025     Resolved Ambulatory Problems     Diagnosis Date Noted    Spontaneous  2024    Hematuria 2023    Bilateral impacted cerumen 2024    Herpes zoster without complication 2024    Paraesophageal hernia with gastroesophageal reflux 2024     Past Medical History:   Diagnosis Date    Abnormal Pap smear of cervix     Abnormal weight loss 2016    Endometriosis     Epigastric pain     History of anemia 2018    History of fatigue 02/15/2019    History of nausea and vomiting 2016    Perianal venous  thrombosis 05/12/2016    Rectal bleeding 3024    Rhus dermatitis 05/08/2017    Right upper quadrant pain 12/05/2016    Urinary tract infection      PSHx:  Past Surgical History:   Procedure Laterality Date    APPENDECTOMY      BARIATRIC SURGERY      CHOLECYSTECTOMY      COLONOSCOPY  10/01/2024    COLPOSCOPY      ENDOMETRIAL ABLATION      ESOPHAGOGASTRODUODENOSCOPY  10/01/2024    EXPLORATORY LAPAROTOMY      GASTRIC BYPASS  04/03/2013    For Morbid Obesity Bypass With Crissy-en-Y    HYSTERECTOMY  02/15/2019    LAPAROSCOPY DIAGNOSTIC / BIOPSY / ASPIRATION / LYSIS N/A 12/13/2024    exploratory       FHx:  No family history on file.     Social Hx:  Leyda Blair    reports that she has never smoked. She has never been exposed to tobacco smoke. She has never used smokeless tobacco.  She  reports that she does not currently use alcohol.  She  reports no history of drug use.  Social History     Socioeconomic History    Marital status:    Tobacco Use    Smoking status: Never     Passive exposure: Never    Smokeless tobacco: Never   Vaping Use    Vaping status: Never Used   Substance and Sexual Activity    Alcohol use: Not Currently    Drug use: Never    Sexual activity: Yes     Partners: Female     Birth control/protection: Other     Comment: Hysterectomy     Medications and allergies reviewed in EMR.    ROS:  Review of Systems - Oncology   10 point review of systems negative except as state in HPI.    Vitals & Statistics:  Objective   BSA: There is no height or weight on file to calculate BSA.  There were no vitals taken for this visit.    Physical Exam:  Physical Exam  Vitals and nursing note reviewed.   Constitutional:       Appearance: Normal appearance. She is normal weight.   HENT:      Head: Normocephalic and atraumatic.      Right Ear: External ear normal.      Left Ear: External ear normal.      Nose: Nose normal.      Mouth/Throat:      Mouth: Mucous membranes are moist.      Pharynx: Oropharynx is clear.  "  Eyes:      Extraocular Movements: Extraocular movements intact.      Conjunctiva/sclera: Conjunctivae normal.      Pupils: Pupils are equal, round, and reactive to light.   Cardiovascular:      Rate and Rhythm: Normal rate and regular rhythm.      Pulses: Normal pulses.      Heart sounds: Normal heart sounds.   Pulmonary:      Effort: Pulmonary effort is normal.      Breath sounds: Normal breath sounds.   Abdominal:      General: Abdomen is flat. Bowel sounds are normal.      Palpations: Abdomen is soft.      Comments: No masses or organomegaly palpable during exam   Musculoskeletal:         General: Normal range of motion.      Cervical back: Normal range of motion.   Lymphadenopathy:      Comments: No lymphadenopathy palpable   Skin:     General: Skin is warm and dry.      Capillary Refill: Capillary refill takes less than 2 seconds.   Neurological:      Mental Status: She is alert and oriented to person, place, and time. Mental status is at baseline.   Psychiatric:         Mood and Affect: Mood normal.         Behavior: Behavior normal.         Thought Content: Thought content normal.         Judgment: Judgment normal.         Results:  Lab Results   Component Value Date    WBC 3.5 (L) 06/03/2025    NEUTROABS 1.97 03/06/2025    IGABSOL 0.01 03/06/2025    LYMPHSABS 1.92 03/06/2025    MONOSABS 0.49 03/06/2025    EOSABS 130 06/03/2025    BASOSABS 49 06/03/2025    RBC 4.35 06/03/2025    MCV 94.9 06/03/2025    MCHC 32.2 06/03/2025    HGB 13.3 06/03/2025    HCT 41.3 06/03/2025     06/03/2025     No results found for: \"RETICCTPCT\"   Lab Results   Component Value Date    CREATININE 0.59 06/03/2025    BUN 14 06/03/2025    EGFR 109 06/03/2025     06/03/2025    K 4.3 06/03/2025     06/03/2025    CO2 29 06/03/2025      Lab Results   Component Value Date    ALT 25 06/03/2025    AST 24 06/03/2025    ALKPHOS 60 06/03/2025    BILITOT 0.5 06/03/2025      Lab Results   Component Value Date    TSH 1.51 " "08/28/2024     Lab Results   Component Value Date    TSH 1.51 08/28/2024    N4MVEQG 7.0 08/25/2022     Lab Results   Component Value Date    IRON 132 06/03/2025    TIBC 248 (L) 06/03/2025    FERRITIN 47 06/03/2025      Lab Results   Component Value Date    LTBYAFUG23 489 06/03/2025      Lab Results   Component Value Date    FOLATE 18.4 06/03/2025     No results found for: \"GREGG\", \"RF\", \"SEDRATE\"   No results found for: \"CRP\"   No results found for: \"MINOO\"  No results found for: \"LDH\"  No results found for: \"HAPTOGLOBIN\"  No results found for: \"SPEP\"  No results found for: \"IGG\", \"IGM\", \"IGA\"  No results found for: \"HEPATOT\", \"HEPAIGM\", \"HEPBCIGM\", \"HEPBCAB\", \"HEPBSAG\", \"HEPCAB\"  No results found for: \"HIV1X2\"    Assessment:  Leyda Blair is a 49 y.o. female following up today for LADONNA (2/2 post-surgical malabsorption)    She is asymptomatic at this time    I reviewed patient's chart including but not limited to labs, imaging, surgical/procedure notes, pathology, hospital notes, doctor's notes.    6/10/25 results:  Leukopenia at 3.5 - Neutropenia at 1,295  RBC indices & H&H WNL  Platelets WNL  Iron studies: Ferritin low for this practice at 47, Iron WNL, TIBC low, %Sat elevated at 53% - Indicative that patient either took iron supplement or ate meal containing iron prior to lab draw    Plan:  LADONNA 2/2 Post-Surgical Malabsorption s/p RYGB  No need for hematologic intervention at this time  Patient currently out of the country with further travel plans over the next 2 months. We have decided to forego iron supplementation at this time as patient is not anemic and is asymptomatic. We will recheck in 2 months time upon her return  RTC in 2 months via in-person visit - F/U sooner if needed/urgent  CBC-D, Iron studies, Folate, B12 up to 1 week prior  Patient to transfer care to colleague in  Benign Hematology as I am transferring from [facility] to [facility] effective 4/2025    I had an extensive discussion with the patient " regarding the diagnosis and discussed the plan of therapy, including general considerations regarding side effects and outcomes. Pt understood and gave appropriate teach back about the plan of care. All questions were answered to the patient's satisfaction. The patient is instructed to contact us at any time if questions or problems arise. Thank you for the opportunity to participate in the care of this very pleasant patient.    Total time = 30 minutes. 50% or more of this time was spent in counseling and/or coordination of care including reviewing medical history/radiology/labs, examining patient, formulating outlined plan with team, and discussing plan with patient/family.    Abhay Arnold PA-C

## 2025-07-23 DIAGNOSIS — Z12.31 ENCOUNTER FOR SCREENING MAMMOGRAM FOR BREAST CANCER: ICD-10-CM

## 2025-08-01 ENCOUNTER — PREP FOR PROCEDURE (OUTPATIENT)
Dept: OBSTETRICS AND GYNECOLOGY | Facility: CLINIC | Age: 50
End: 2025-08-01

## 2025-08-01 ENCOUNTER — OFFICE VISIT (OUTPATIENT)
Dept: OBSTETRICS AND GYNECOLOGY | Facility: CLINIC | Age: 50
End: 2025-08-01
Payer: COMMERCIAL

## 2025-08-01 VITALS
BODY MASS INDEX: 29.47 KG/M2 | WEIGHT: 172.6 LBS | SYSTOLIC BLOOD PRESSURE: 124 MMHG | DIASTOLIC BLOOD PRESSURE: 75 MMHG | HEIGHT: 64 IN | HEART RATE: 97 BPM

## 2025-08-01 DIAGNOSIS — N95.2 VAGINAL ATROPHY: ICD-10-CM

## 2025-08-01 DIAGNOSIS — N39.3 SUI (STRESS URINARY INCONTINENCE, FEMALE): Primary | ICD-10-CM

## 2025-08-01 DIAGNOSIS — N39.3 SUI (STRESS URINARY INCONTINENCE, FEMALE): ICD-10-CM

## 2025-08-01 DIAGNOSIS — N94.10 DYSPAREUNIA IN FEMALE: ICD-10-CM

## 2025-08-01 DIAGNOSIS — N81.10 POP-Q STAGE 2 CYSTOCELE: Primary | ICD-10-CM

## 2025-08-01 DIAGNOSIS — N81.10 FEMALE BLADDER PROLAPSE: ICD-10-CM

## 2025-08-01 LAB
POC APPEARANCE, URINE: CLEAR
POC BILIRUBIN, URINE: NEGATIVE
POC BLOOD, URINE: NEGATIVE
POC COLOR, URINE: YELLOW
POC GLUCOSE, URINE: NEGATIVE MG/DL
POC KETONES, URINE: ABNORMAL MG/DL
POC LEUKOCYTES, URINE: NEGATIVE
POC NITRITE,URINE: NEGATIVE
POC PH, URINE: 5.5 PH
POC PROTEIN, URINE: NEGATIVE MG/DL
POC SPECIFIC GRAVITY, URINE: 1.02
POC UROBILINOGEN, URINE: 0.2 EU/DL

## 2025-08-01 PROCEDURE — 81003 URINALYSIS AUTO W/O SCOPE: CPT | Performed by: OBSTETRICS & GYNECOLOGY

## 2025-08-01 PROCEDURE — 99204 OFFICE O/P NEW MOD 45 MIN: CPT | Performed by: OBSTETRICS & GYNECOLOGY

## 2025-08-01 PROCEDURE — 99214 OFFICE O/P EST MOD 30 MIN: CPT | Mod: GC | Performed by: OBSTETRICS & GYNECOLOGY

## 2025-08-01 PROCEDURE — 3008F BODY MASS INDEX DOCD: CPT | Performed by: OBSTETRICS & GYNECOLOGY

## 2025-08-01 RX ORDER — PHENAZOPYRIDINE HYDROCHLORIDE 200 MG/1
200 TABLET, FILM COATED ORAL ONCE
OUTPATIENT
Start: 2025-08-01 | End: 2025-08-01

## 2025-08-01 RX ORDER — ACETAMINOPHEN 325 MG/1
975 TABLET ORAL ONCE
OUTPATIENT
Start: 2025-08-01 | End: 2025-08-01

## 2025-08-01 RX ORDER — CELECOXIB 400 MG/1
400 CAPSULE ORAL ONCE
OUTPATIENT
Start: 2025-08-01 | End: 2025-08-01

## 2025-08-01 RX ORDER — CEFAZOLIN SODIUM 2 G/100ML
2 INJECTION, SOLUTION INTRAVENOUS ONCE
OUTPATIENT
Start: 2025-08-01 | End: 2025-08-01

## 2025-08-01 ASSESSMENT — PAIN SCALES - GENERAL: PAINLEVEL_OUTOF10: 0-NO PAIN

## 2025-08-01 ASSESSMENT — ENCOUNTER SYMPTOMS
LOSS OF SENSATION IN FEET: 0
OCCASIONAL FEELINGS OF UNSTEADINESS: 0
DEPRESSION: 0

## 2025-08-01 NOTE — PATIENT INSTRUCTIONS
Lubricants: Types & Recommendations    Water-based  These are the most commonly known lubricants, including the well-known KY jelly. Water-based lubricants are absorbent and can be used safely with sex toys and latex condoms.  Since they are water-based, they don’t last as long and may require reapplication during sex.     Recommendations: Bio-Nude GoodCleanLove, Slippery Stuff    Silicone-based  Non-absorbent lubricants that last the longest (since they don’t absorb). Often are hypoallergenic and low irritation, and have little impact on pH. For people with vaginal dryness or pain with sex, this is our #1 recommendation.  Not always sex toy friendly. Not for use in the shower, as they are very slippery.    Recommendations: Uberlube, Astroglide Silicone      Oil-based  May cause irritation or bacterial imbalance in those who are prone to yeast infection or BV.  Not sex toy-friendly, not condom-friendly     Recommendation: Coconu, AH! YES OB Plant Oil Lubricant      Hybrids  These products are usually water- and silicone-based and have properties of both.    Specific recommendations: Sliquid Silk       Other Options  Aloe-based: condom and sex toy safe, but may cause irritation. (Ex. Aloe Cadabra Natural Aloe, GoodCleanLove Naked)  Coconut oil: not condom friendly, higher rate of yeast infections      Things to SKIP  “Gimmicks” like warming/cooling/tingling, flavors, or fragrances (parfum, perfumes, fragrance oil) can increase risk of infections, including yeast, STIs, and BV  Additives, like parabens, glycerin, or other preservatives may be harmful over time  Vaseline or petroleum jelly, which can cause infections and degrade condoms    bowel and blood vessels, as well as risk of fistula formation. We discussed the post operative voiding trial and plan based on the results of this trial. We discussed potential development or worsening of urgency incontinence, and potential need for sling lysis or removal in the future. We discussed an 85% cure or improvement rate with mid urethral sling. Alternatives to the procedure include observation, pelvic floor physical therapy, and vaginal pessary.   - Discussed that mid urethral sling does not address urgency incontinence specifically but some patients with mixed incontinence report improvement in urgency symptoms. She understands she may still need treatment for her urgency urinary incontinence after this procedure.   - Perioperative antibiotics will be administered  - Pain control post op with alternating tylenol and ibuprofen, with oxycodone available for breakthrough pain. Goal of pain control is a level 4/10 or less.   - Recommend Miralax to avoid constipation. Take BID until first bowel movement after surgery. The goal is to have stools the consistency of toothpaste- soft and easy to pass without straining. Avoid diarrhea, if this occurs, reduce the dose of Miralax.

## 2025-08-01 NOTE — PROGRESS NOTES
Referred by: Taylor Morley MD    PCP  Emily Kohler MD         CHIEF COMPLAINT:  stress incontinence         HISTORY OF PRESENT ILLNESS:  This is a  49 y.o. female who presents with stress incontinence and prolapse.    Reports recent episode of UTI-like sx after intercourse that starts 24-8 hrs after intercourse.  Reports worsening stress incontinence that has been interfering with exercise.    The following were reviewed to gain additional history:  External notes: Progress Note from Taylor Morley  Test results: N/A         Specifically, she describes the following pelvic floor symptoms:          Prolapse: Yes per Dr. Morley but no sx       - Splinting to urinate: No       - Splinting for bowel movement/stool trapping: No              Incontinence:  Yes             Stress - jump roping, cough, sneeze, golf swinging, jumping             She wears pads and drinks less water due to urine leakage.               Urinary Symptoms:       - Frequency:  no        - Nocturia: Yes             # Voids:  1-2 times most nights       - Urgency:  infrequent/none       - Incomplete emptying:  Yes - in the first few days after having sex       - Hesitancy:  No       - Pain with voiding:  Yes - burning after sex       - Postvoid dribbling:  Yes - most times she pees       - Excessive fluid intake: Yes - previously drank 80-90 oz per day, now drinking somewhat less               History:       - Recurrent UTI:  Yes - about once per year       - Hematuria:  Not recently       - Stones:  Yes - possible kidney stone 2024       - Kidney Disease:  No                  Bowel Symptoms:       - Regular: Yes       - Diarrhea:No       - Constipation: Yes, takes Metamucil       - Fecal Incontinence:  No       - Flatus Incontinence:  No       - Fecal urgency:   No    Past medical and surgical hx reviewed - pertinent for gastric bypass surgery (2015), TL BS ~2010        Gyn History:  -s/p hysterectomy for AUB-L  - Menopausal: Yes  "12 years           Postmenopausal bleeding: No  - HRT: No  - Pap up to date: Yes -    History of abnormal pap: Yes - prior to hyst  - Sexually active:  Yes, monogamous  Dyspareunia: No   Other issues: dryness, she leaks urine with intercourse   - Number of prior vaginal deliveries:  x3   Number of prior operative deliveries none   Prior OASI? no  - Number of prior c-sections: 0    - Mammogram up to date: No, due now  - Colonoscopy up to date: No    OB History          4    Para   3    Term   3            AB   1    Living   3         SAB        IAB        Ectopic        Multiple        Live Births                           PHYSICAL EXAMINATION:  No LMP recorded. Patient has had a hysterectomy.  Body mass index is 29.63 kg/m².  /75 (Patient Position: Sitting)   Pulse 97   Ht 1.626 m (5' 4\")   Wt 78.3 kg (172 lb 9.6 oz)   BMI 29.63 kg/m²   General Appearance: well appearing  Neuro: Alert and oriented   HEENT: mucous membranes moist, neck supple  Resp: No respiratory distress, normal work of breathing  MSK: normal range of motion, gait appropriate    US PELVIS TRANSABDOMINAL WITH TRANSVAGINAL;  2025 4:00 pm      INDICATION:  Signs/Symptoms:pelvic pain, s/p hysterectomy.      FINDINGS:  Neither ovary detected. Uterus not seen.      No fluid collections or pelvic masses identified      IMPRESSION:  1. Uterus is not seen. Neither ovary detected. No correlate for  pelvic pain by ultrasound criteria    Pelvic:  Genitourinary:  normal external genitalia, [place]'s glands negative, [place]'s glands negative  Urethra   normal meatus, non-tender, no periurethral mass  Vaginal mucosa  normal  Cervix surgically absent   Uterus surgically absent    Positive valsalva stress test.     POP-Q (in supine position):       Aa 0     Ba 0     C -7              gh 6     pb 4     tvl               Ap -2     Bp -2     D x    PVR (by Ultrasound): 0 ml         IMPRESSION AND PLAN:  Leyda Blair is a 49 " y.o. who presents with stress urinary incontinence.     TAMIA  -Ongoing TAMIA with positive Valsalva test in office today  -Can defer UDS in setting of demonstrated TAMIA  -Discussed management options including pelvic floor physical therapy, pessaries, and procedural methods (Bulkamid, MUS). Pt electing for MUS and would like to proceed with scheduling. Reviewed 85% success rate with MUS and 3% risk of a mesh complication. Rarely, a complication from the MUS is a fistulous connection. The Bulkamid has 60% success rate, but has no risk of mesh complication.   -Risks and benefits of each method discussed, pt expressed understanding and would like to move forward with mid-urethral sling  -Message to be sent to , case request placed  -Additional surgical information given to patient with handout    Dyspareunia  -Pt reports pain with sex specifically related to dryness  -Currently using Vaseline as lubricant  -Additional information to be sent to patient via [facility]    All questions and concerns were answered and addressed.  The patient expressed understanding and agrees with the plan.     Patient seen and discussed with Dr. Lisa Hodge MD, MPH  Obstetrics & Gynecology, PGY-2     Scribe Attestation:   Yesica ROGERS, am scribing for virtually, and in the presence of Rohini Gonzalez MD MPH on 08/01/2025 at 3:30 PM.     8/1/2025

## 2025-08-06 PROBLEM — N39.3 SUI (STRESS URINARY INCONTINENCE, FEMALE): Status: ACTIVE | Noted: 2025-08-01

## 2025-08-08 ENCOUNTER — LAB (OUTPATIENT)
Dept: LAB | Facility: HOSPITAL | Age: 50
End: 2025-08-08
Payer: COMMERCIAL

## 2025-08-08 LAB
ALBUMIN SERPL BCP-MCNC: 3.8 G/DL (ref 3.4–5)
ALP SERPL-CCNC: 62 U/L (ref 33–110)
ALT SERPL W P-5'-P-CCNC: 18 U/L (ref 7–45)
ANION GAP SERPL CALC-SCNC: 9 MMOL/L (ref 10–20)
AST SERPL W P-5'-P-CCNC: 18 U/L (ref 9–39)
BASOPHILS # BLD AUTO: 0.04 X10*3/UL (ref 0–0.1)
BASOPHILS NFR BLD AUTO: 1.1 %
BILIRUB SERPL-MCNC: 0.4 MG/DL (ref 0–1.2)
BUN SERPL-MCNC: 12 MG/DL (ref 6–23)
CALCIUM SERPL-MCNC: 9.1 MG/DL (ref 8.6–10.3)
CHLORIDE SERPL-SCNC: 105 MMOL/L (ref 98–107)
CO2 SERPL-SCNC: 31 MMOL/L (ref 21–32)
CREAT SERPL-MCNC: 0.56 MG/DL (ref 0.5–1.05)
EGFRCR SERPLBLD CKD-EPI 2021: >90 ML/MIN/1.73M*2
EOSINOPHIL # BLD AUTO: 0.14 X10*3/UL (ref 0–0.7)
EOSINOPHIL NFR BLD AUTO: 4 %
ERYTHROCYTE [DISTWIDTH] IN BLOOD BY AUTOMATED COUNT: 12.4 % (ref 11.5–14.5)
FERRITIN SERPL-MCNC: 85 NG/ML (ref 8–150)
FOLATE SERPL-MCNC: 21.8 NG/ML
GLUCOSE SERPL-MCNC: 82 MG/DL (ref 74–99)
HCT VFR BLD AUTO: 41.4 % (ref 36–46)
HGB BLD-MCNC: 13.6 G/DL (ref 12–16)
IMM GRANULOCYTES # BLD AUTO: 0.01 X10*3/UL (ref 0–0.7)
IMM GRANULOCYTES NFR BLD AUTO: 0.3 % (ref 0–0.9)
IRON SATN MFR SERPL: 45 % (ref 25–45)
IRON SERPL-MCNC: 115 UG/DL (ref 35–150)
LYMPHOCYTES # BLD AUTO: 1.67 X10*3/UL (ref 1.2–4.8)
LYMPHOCYTES NFR BLD AUTO: 47.3 %
MCH RBC QN AUTO: 31.4 PG (ref 26–34)
MCHC RBC AUTO-ENTMCNC: 32.9 G/DL (ref 32–36)
MCV RBC AUTO: 96 FL (ref 80–100)
MONOCYTES # BLD AUTO: 0.36 X10*3/UL (ref 0.1–1)
MONOCYTES NFR BLD AUTO: 10.2 %
NEUTROPHILS # BLD AUTO: 1.31 X10*3/UL (ref 1.2–7.7)
NEUTROPHILS NFR BLD AUTO: 37.1 %
NRBC BLD-RTO: 0 /100 WBCS (ref 0–0)
PLATELET # BLD AUTO: 275 X10*3/UL (ref 150–450)
POTASSIUM SERPL-SCNC: 4.4 MMOL/L (ref 3.5–5.3)
PROT SERPL-MCNC: 6.1 G/DL (ref 6.4–8.2)
RBC # BLD AUTO: 4.33 X10*6/UL (ref 4–5.2)
SODIUM SERPL-SCNC: 141 MMOL/L (ref 136–145)
TIBC SERPL-MCNC: 258 UG/DL (ref 240–445)
UIBC SERPL-MCNC: 143 UG/DL (ref 110–370)
VIT B12 SERPL-MCNC: 662 PG/ML (ref 211–911)
WBC # BLD AUTO: 3.5 X10*3/UL (ref 4.4–11.3)

## 2025-08-08 PROCEDURE — 83540 ASSAY OF IRON: CPT

## 2025-08-08 PROCEDURE — 82607 VITAMIN B-12: CPT

## 2025-08-08 PROCEDURE — 82746 ASSAY OF FOLIC ACID SERUM: CPT

## 2025-08-08 PROCEDURE — 82728 ASSAY OF FERRITIN: CPT

## 2025-08-08 PROCEDURE — 85025 COMPLETE CBC W/AUTO DIFF WBC: CPT

## 2025-08-08 PROCEDURE — 80053 COMPREHEN METABOLIC PANEL: CPT

## 2025-08-08 PROCEDURE — 83550 IRON BINDING TEST: CPT

## 2025-08-11 DIAGNOSIS — D50.9 IRON DEFICIENCY ANEMIA, UNSPECIFIED IRON DEFICIENCY ANEMIA TYPE: ICD-10-CM

## 2025-08-11 DIAGNOSIS — Z98.84 S/P BARIATRIC SURGERY: ICD-10-CM

## 2025-08-12 ENCOUNTER — APPOINTMENT (OUTPATIENT)
Dept: OBSTETRICS AND GYNECOLOGY | Facility: CLINIC | Age: 50
End: 2025-08-12
Payer: COMMERCIAL

## 2025-08-12 ENCOUNTER — APPOINTMENT (OUTPATIENT)
Dept: HEMATOLOGY/ONCOLOGY | Facility: CLINIC | Age: 50
End: 2025-08-12
Payer: COMMERCIAL

## 2025-08-12 ENCOUNTER — DOCUMENTATION (OUTPATIENT)
Dept: HEMATOLOGY/ONCOLOGY | Facility: CLINIC | Age: 50
End: 2025-08-12

## 2025-08-12 DIAGNOSIS — D50.9 IRON DEFICIENCY ANEMIA, UNSPECIFIED IRON DEFICIENCY ANEMIA TYPE: Primary | ICD-10-CM

## 2025-08-14 PROBLEM — R63.2 INCREASED APPETITE: Status: RESOLVED | Noted: 2024-02-12 | Resolved: 2025-08-14

## 2025-08-14 PROBLEM — K59.00 CONSTIPATION: Status: RESOLVED | Noted: 2024-02-12 | Resolved: 2025-08-14

## 2025-08-15 DIAGNOSIS — B35.1 ONYCHOMYCOSIS: ICD-10-CM

## 2025-08-15 RX ORDER — TERBINAFINE HYDROCHLORIDE 250 MG/1
TABLET ORAL
Qty: 30 TABLET | Refills: 1 | OUTPATIENT
Start: 2025-08-15

## 2025-08-20 ENCOUNTER — APPOINTMENT (OUTPATIENT)
Dept: PRIMARY CARE | Facility: CLINIC | Age: 50
End: 2025-08-20
Payer: COMMERCIAL

## 2025-08-20 ENCOUNTER — PRE-ADMISSION TESTING (OUTPATIENT)
Dept: PREADMISSION TESTING | Facility: HOSPITAL | Age: 50
End: 2025-08-20
Payer: COMMERCIAL

## 2025-08-20 VITALS
OXYGEN SATURATION: 97 % | RESPIRATION RATE: 16 BRPM | HEIGHT: 64 IN | DIASTOLIC BLOOD PRESSURE: 82 MMHG | TEMPERATURE: 96.8 F | WEIGHT: 173 LBS | HEART RATE: 75 BPM | SYSTOLIC BLOOD PRESSURE: 126 MMHG | BODY MASS INDEX: 29.53 KG/M2

## 2025-08-20 VITALS
TEMPERATURE: 98.1 F | DIASTOLIC BLOOD PRESSURE: 84 MMHG | SYSTOLIC BLOOD PRESSURE: 122 MMHG | HEIGHT: 64 IN | BODY MASS INDEX: 29.94 KG/M2 | WEIGHT: 175.38 LBS | OXYGEN SATURATION: 95 % | HEART RATE: 82 BPM

## 2025-08-20 DIAGNOSIS — N39.3 SUI (STRESS URINARY INCONTINENCE, FEMALE): ICD-10-CM

## 2025-08-20 DIAGNOSIS — Z00.00 ROUTINE GENERAL MEDICAL EXAMINATION AT A HEALTH CARE FACILITY: Primary | ICD-10-CM

## 2025-08-20 DIAGNOSIS — Z79.899 ENCOUNTER FOR LONG-TERM (CURRENT) USE OF MEDICATIONS: ICD-10-CM

## 2025-08-20 DIAGNOSIS — R53.83 MALAISE AND FATIGUE: ICD-10-CM

## 2025-08-20 DIAGNOSIS — Z01.818 PREOPERATIVE TESTING: Primary | ICD-10-CM

## 2025-08-20 DIAGNOSIS — R53.83 OTHER FATIGUE: ICD-10-CM

## 2025-08-20 DIAGNOSIS — Z13.6 SCREENING FOR CARDIOVASCULAR CONDITION: ICD-10-CM

## 2025-08-20 DIAGNOSIS — R53.81 MALAISE AND FATIGUE: ICD-10-CM

## 2025-08-20 DIAGNOSIS — K21.9 GASTROESOPHAGEAL REFLUX DISEASE, UNSPECIFIED WHETHER ESOPHAGITIS PRESENT: ICD-10-CM

## 2025-08-20 PROBLEM — D64.9 ANEMIA: Status: RESOLVED | Noted: 2023-08-14 | Resolved: 2025-08-20

## 2025-08-20 PROBLEM — R87.619 ABNORMAL CERVICAL PAPANICOLAOU SMEAR: Status: RESOLVED | Noted: 2025-02-18 | Resolved: 2025-08-20

## 2025-08-20 PROBLEM — K92.1: Status: RESOLVED | Noted: 2024-10-14 | Resolved: 2025-08-20

## 2025-08-20 PROBLEM — R10.2 PAIN IN FEMALE PELVIS: Status: RESOLVED | Noted: 2025-02-18 | Resolved: 2025-08-20

## 2025-08-20 PROBLEM — E66.9 OBESITY, UNSPECIFIED: Status: RESOLVED | Noted: 2024-02-12 | Resolved: 2025-08-20

## 2025-08-20 PROCEDURE — 1036F TOBACCO NON-USER: CPT | Performed by: FAMILY MEDICINE

## 2025-08-20 PROCEDURE — 99396 PREV VISIT EST AGE 40-64: CPT | Performed by: FAMILY MEDICINE

## 2025-08-20 PROCEDURE — 99204 OFFICE O/P NEW MOD 45 MIN: CPT | Performed by: NURSE PRACTITIONER

## 2025-08-20 PROCEDURE — 87081 CULTURE SCREEN ONLY: CPT | Mod: TRILAB

## 2025-08-20 PROCEDURE — 3008F BODY MASS INDEX DOCD: CPT | Performed by: FAMILY MEDICINE

## 2025-08-20 RX ORDER — ACETAMINOPHEN 325 MG/1
650 TABLET ORAL EVERY 8 HOURS PRN
COMMUNITY

## 2025-08-20 RX ORDER — OMEPRAZOLE 20 MG/1
40 CAPSULE, DELAYED RELEASE ORAL DAILY
Qty: 60 CAPSULE | Refills: 11 | Status: SHIPPED | OUTPATIENT
Start: 2025-08-20 | End: 2026-08-20

## 2025-08-20 RX ORDER — CHLORHEXIDINE GLUCONATE ORAL RINSE 1.2 MG/ML
SOLUTION DENTAL
Qty: 473 ML | Refills: 0 | Status: SHIPPED | OUTPATIENT
Start: 2025-08-20

## 2025-08-20 RX ORDER — CHLORHEXIDINE GLUCONATE 40 MG/ML
SOLUTION TOPICAL
Qty: 473 ML | Refills: 0 | Status: SHIPPED | OUTPATIENT
Start: 2025-08-20

## 2025-08-20 ASSESSMENT — DUKE ACTIVITY SCORE INDEX (DASI)
CAN YOU RUN A SHORT DISTANCE: YES
CAN YOU DO MODERATE WORK AROUND THE HOUSE LIKE VACUUMING, SWEEPING FLOORS OR CARRYING GROCERIES: YES
CAN YOU DO HEAVY WORK AROUND THE HOUSE LIKE SCRUBBING FLOORS OR LIFTING AND MOVING HEAVY FURNITURE: YES
CAN YOU DO YARD WORK LIKE RAKING LEAVES, WEEDING OR PUSHING A MOWER: YES
DASI METS SCORE: 9.9
CAN YOU PARTICIPATE IN STRENOUS SPORTS LIKE SWIMMING, SINGLES TENNIS, FOOTBALL, BASKETBALL, OR SKIING: YES
CAN YOU CLIMB A FLIGHT OF STAIRS OR WALK UP A HILL: YES
TOTAL_SCORE: 58.2
CAN YOU WALK A BLOCK OR TWO ON LEVEL GROUND: YES
CAN YOU TAKE CARE OF YOURSELF (EAT, DRESS, BATHE, OR USE TOILET): YES
CAN YOU PARTICIPATE IN MODERATE RECREATIONAL ACTIVITIES LIKE GOLF, BOWLING, DANCING, DOUBLES TENNIS OR THROWING A BASEBALL OR FOOTBALL: YES
CAN YOU DO LIGHT WORK AROUND THE HOUSE LIKE DUSTING OR WASHING DISHES: YES
CAN YOU HAVE SEXUAL RELATIONS: YES
CAN YOU WALK INDOORS, SUCH AS AROUND YOUR HOUSE: YES

## 2025-08-20 ASSESSMENT — PROMIS GLOBAL HEALTH SCALE
RATE_PHYSICAL_HEALTH: GOOD
CARRYOUT_SOCIAL_ACTIVITIES: VERY GOOD
RATE_SOCIAL_SATISFACTION: GOOD
CARRYOUT_PHYSICAL_ACTIVITIES: COMPLETELY
RATE_GENERAL_HEALTH: GOOD
EMOTIONAL_PROBLEMS: RARELY
RATE_QUALITY_OF_LIFE: VERY GOOD
RATE_AVERAGE_FATIGUE: SEVERE
RATE_AVERAGE_PAIN: 1
RATE_MENTAL_HEALTH: GOOD

## 2025-08-20 ASSESSMENT — ENCOUNTER SYMPTOMS
NEUROLOGICAL NEGATIVE: 1
GASTROINTESTINAL NEGATIVE: 1
ACTIVITY CHANGE: 1
CARDIOVASCULAR NEGATIVE: 1
RESPIRATORY NEGATIVE: 1
PSYCHIATRIC NEGATIVE: 1
SHORTNESS OF BREATH: 0
EYES NEGATIVE: 1
DIARRHEA: 0
CONSTIPATION: 0
MUSCULOSKELETAL NEGATIVE: 1

## 2025-08-20 ASSESSMENT — PATIENT HEALTH QUESTIONNAIRE - PHQ9
1. LITTLE INTEREST OR PLEASURE IN DOING THINGS: NOT AT ALL
2. FEELING DOWN, DEPRESSED OR HOPELESS: NOT AT ALL
SUM OF ALL RESPONSES TO PHQ9 QUESTIONS 1 AND 2: 0

## 2025-08-21 LAB — STAPHYLOCOCCUS SPEC CULT: NORMAL

## 2025-08-25 ENCOUNTER — ANESTHESIA EVENT (OUTPATIENT)
Dept: OPERATING ROOM | Facility: HOSPITAL | Age: 50
End: 2025-08-25
Payer: COMMERCIAL

## 2025-08-25 ENCOUNTER — ANESTHESIA (OUTPATIENT)
Dept: OPERATING ROOM | Facility: HOSPITAL | Age: 50
End: 2025-08-25
Payer: COMMERCIAL

## 2025-08-25 ENCOUNTER — PHARMACY VISIT (OUTPATIENT)
Dept: PHARMACY | Facility: CLINIC | Age: 50
End: 2025-08-25
Payer: MEDICARE

## 2025-08-25 ENCOUNTER — HOSPITAL ENCOUNTER (OUTPATIENT)
Facility: HOSPITAL | Age: 50
Setting detail: OUTPATIENT SURGERY
Discharge: HOME | End: 2025-08-25
Attending: OBSTETRICS & GYNECOLOGY | Admitting: OBSTETRICS & GYNECOLOGY
Payer: COMMERCIAL

## 2025-08-25 VITALS
HEART RATE: 75 BPM | WEIGHT: 173 LBS | SYSTOLIC BLOOD PRESSURE: 135 MMHG | OXYGEN SATURATION: 97 % | TEMPERATURE: 97.9 F | HEIGHT: 64 IN | DIASTOLIC BLOOD PRESSURE: 90 MMHG | BODY MASS INDEX: 29.53 KG/M2 | RESPIRATION RATE: 16 BRPM

## 2025-08-25 DIAGNOSIS — N39.3 SUI (STRESS URINARY INCONTINENCE, FEMALE): Primary | ICD-10-CM

## 2025-08-25 PROCEDURE — 3700000001 HC GENERAL ANESTHESIA TIME - INITIAL BASE CHARGE: Performed by: OBSTETRICS & GYNECOLOGY

## 2025-08-25 PROCEDURE — 2500000004 HC RX 250 GENERAL PHARMACY W/ HCPCS (ALT 636 FOR OP/ED): Performed by: OBSTETRICS & GYNECOLOGY

## 2025-08-25 PROCEDURE — 7100000009 HC PHASE TWO TIME - INITIAL BASE CHARGE: Performed by: OBSTETRICS & GYNECOLOGY

## 2025-08-25 PROCEDURE — A4649 SURGICAL SUPPLIES: HCPCS | Performed by: OBSTETRICS & GYNECOLOGY

## 2025-08-25 PROCEDURE — 3700000002 HC GENERAL ANESTHESIA TIME - EACH INCREMENTAL 1 MINUTE: Performed by: OBSTETRICS & GYNECOLOGY

## 2025-08-25 PROCEDURE — C1771 REP DEV, URINARY, W/SLING: HCPCS | Performed by: OBSTETRICS & GYNECOLOGY

## 2025-08-25 PROCEDURE — 3600000003 HC OR TIME - INITIAL BASE CHARGE - PROCEDURE LEVEL THREE: Performed by: OBSTETRICS & GYNECOLOGY

## 2025-08-25 PROCEDURE — 2720000007 HC OR 272 NO HCPCS: Performed by: OBSTETRICS & GYNECOLOGY

## 2025-08-25 PROCEDURE — 2780000003 HC OR 278 NO HCPCS: Performed by: OBSTETRICS & GYNECOLOGY

## 2025-08-25 PROCEDURE — 2500000001 HC RX 250 WO HCPCS SELF ADMINISTERED DRUGS (ALT 637 FOR MEDICARE OP): Performed by: OBSTETRICS & GYNECOLOGY

## 2025-08-25 PROCEDURE — RXMED WILLOW AMBULATORY MEDICATION CHARGE

## 2025-08-25 PROCEDURE — 2500000004 HC RX 250 GENERAL PHARMACY W/ HCPCS (ALT 636 FOR OP/ED): Performed by: ANESTHESIOLOGY

## 2025-08-25 PROCEDURE — 7100000002 HC RECOVERY ROOM TIME - EACH INCREMENTAL 1 MINUTE: Performed by: OBSTETRICS & GYNECOLOGY

## 2025-08-25 PROCEDURE — 57288 REPAIR BLADDER DEFECT: CPT | Performed by: OBSTETRICS & GYNECOLOGY

## 2025-08-25 PROCEDURE — 3600000008 HC OR TIME - EACH INCREMENTAL 1 MINUTE - PROCEDURE LEVEL THREE: Performed by: OBSTETRICS & GYNECOLOGY

## 2025-08-25 PROCEDURE — 7100000001 HC RECOVERY ROOM TIME - INITIAL BASE CHARGE: Performed by: OBSTETRICS & GYNECOLOGY

## 2025-08-25 PROCEDURE — 7100000010 HC PHASE TWO TIME - EACH INCREMENTAL 1 MINUTE: Performed by: OBSTETRICS & GYNECOLOGY

## 2025-08-25 DEVICE — SLING, DESARA, BLUE TV, WITH 2.7 INTRODUCER: Type: IMPLANTABLE DEVICE | Site: BLADDER | Status: FUNCTIONAL

## 2025-08-25 RX ORDER — MEPERIDINE HYDROCHLORIDE 25 MG/ML
12.5 INJECTION INTRAMUSCULAR; INTRAVENOUS; SUBCUTANEOUS EVERY 10 MIN PRN
Status: DISCONTINUED | OUTPATIENT
Start: 2025-08-25 | End: 2025-08-25 | Stop reason: HOSPADM

## 2025-08-25 RX ORDER — FENTANYL CITRATE 50 UG/ML
50 INJECTION, SOLUTION INTRAMUSCULAR; INTRAVENOUS EVERY 5 MIN PRN
Status: DISCONTINUED | OUTPATIENT
Start: 2025-08-25 | End: 2025-08-25 | Stop reason: HOSPADM

## 2025-08-25 RX ORDER — FENTANYL CITRATE 50 UG/ML
INJECTION, SOLUTION INTRAMUSCULAR; INTRAVENOUS AS NEEDED
Status: DISCONTINUED | OUTPATIENT
Start: 2025-08-25 | End: 2025-08-25

## 2025-08-25 RX ORDER — ACETAMINOPHEN 325 MG/1
975 TABLET ORAL ONCE
Status: COMPLETED | OUTPATIENT
Start: 2025-08-25 | End: 2025-08-25

## 2025-08-25 RX ORDER — ACETAMINOPHEN 325 MG/1
650 TABLET ORAL EVERY 6 HOURS PRN
Qty: 20 TABLET | Refills: 0 | Status: SHIPPED | OUTPATIENT
Start: 2025-08-25

## 2025-08-25 RX ORDER — SODIUM CHLORIDE, SODIUM LACTATE, POTASSIUM CHLORIDE, CALCIUM CHLORIDE 600; 310; 30; 20 MG/100ML; MG/100ML; MG/100ML; MG/100ML
100 INJECTION, SOLUTION INTRAVENOUS CONTINUOUS
Status: DISCONTINUED | OUTPATIENT
Start: 2025-08-25 | End: 2025-08-25 | Stop reason: HOSPADM

## 2025-08-25 RX ORDER — ONDANSETRON HYDROCHLORIDE 2 MG/ML
4 INJECTION, SOLUTION INTRAVENOUS ONCE AS NEEDED
Status: COMPLETED | OUTPATIENT
Start: 2025-08-25 | End: 2025-08-25

## 2025-08-25 RX ORDER — ALBUTEROL SULFATE 0.83 MG/ML
2.5 SOLUTION RESPIRATORY (INHALATION) ONCE AS NEEDED
Status: DISCONTINUED | OUTPATIENT
Start: 2025-08-25 | End: 2025-08-25 | Stop reason: HOSPADM

## 2025-08-25 RX ORDER — CELECOXIB 200 MG/1
400 CAPSULE ORAL ONCE
Status: COMPLETED | OUTPATIENT
Start: 2025-08-25 | End: 2025-08-25

## 2025-08-25 RX ORDER — ONDANSETRON HYDROCHLORIDE 2 MG/ML
INJECTION, SOLUTION INTRAVENOUS AS NEEDED
Status: DISCONTINUED | OUTPATIENT
Start: 2025-08-25 | End: 2025-08-25

## 2025-08-25 RX ORDER — IBUPROFEN 600 MG/1
600 TABLET, FILM COATED ORAL EVERY 6 HOURS PRN
Qty: 20 TABLET | Refills: 0 | Status: SHIPPED | OUTPATIENT
Start: 2025-08-25

## 2025-08-25 RX ORDER — MIDAZOLAM HYDROCHLORIDE 1 MG/ML
INJECTION, SOLUTION INTRAMUSCULAR; INTRAVENOUS AS NEEDED
Status: DISCONTINUED | OUTPATIENT
Start: 2025-08-25 | End: 2025-08-25

## 2025-08-25 RX ORDER — PHENAZOPYRIDINE HYDROCHLORIDE 100 MG/1
200 TABLET, FILM COATED ORAL ONCE
Status: COMPLETED | OUTPATIENT
Start: 2025-08-25 | End: 2025-08-25

## 2025-08-25 RX ORDER — LIDOCAINE HYDROCHLORIDE 20 MG/ML
INJECTION, SOLUTION INFILTRATION; PERINEURAL AS NEEDED
Status: DISCONTINUED | OUTPATIENT
Start: 2025-08-25 | End: 2025-08-25

## 2025-08-25 RX ORDER — CEFAZOLIN SODIUM 2 G/100ML
2 INJECTION, SOLUTION INTRAVENOUS ONCE
Status: COMPLETED | OUTPATIENT
Start: 2025-08-25 | End: 2025-08-25

## 2025-08-25 RX ORDER — OXYCODONE HYDROCHLORIDE 5 MG/1
5 TABLET ORAL EVERY 6 HOURS PRN
Qty: 5 TABLET | Refills: 0 | Status: SHIPPED | OUTPATIENT
Start: 2025-08-25

## 2025-08-25 RX ORDER — MIDAZOLAM HYDROCHLORIDE 1 MG/ML
1 INJECTION, SOLUTION INTRAMUSCULAR; INTRAVENOUS ONCE AS NEEDED
Status: DISCONTINUED | OUTPATIENT
Start: 2025-08-25 | End: 2025-08-25 | Stop reason: HOSPADM

## 2025-08-25 RX ORDER — HYDRALAZINE HYDROCHLORIDE 20 MG/ML
5 INJECTION INTRAMUSCULAR; INTRAVENOUS EVERY 30 MIN PRN
Status: DISCONTINUED | OUTPATIENT
Start: 2025-08-25 | End: 2025-08-25 | Stop reason: HOSPADM

## 2025-08-25 RX ORDER — PROPOFOL 10 MG/ML
INJECTION, EMULSION INTRAVENOUS AS NEEDED
Status: DISCONTINUED | OUTPATIENT
Start: 2025-08-25 | End: 2025-08-25

## 2025-08-25 RX ORDER — LIDOCAINE HYDROCHLORIDE 10 MG/ML
0.1 INJECTION, SOLUTION INFILTRATION; PERINEURAL ONCE
Status: DISCONTINUED | OUTPATIENT
Start: 2025-08-25 | End: 2025-08-25 | Stop reason: HOSPADM

## 2025-08-25 RX ADMIN — MIDAZOLAM 2 MG: 1 INJECTION INTRAMUSCULAR; INTRAVENOUS at 09:11

## 2025-08-25 RX ADMIN — FENTANYL CITRATE 25 MCG: 50 INJECTION, SOLUTION INTRAMUSCULAR; INTRAVENOUS at 09:48

## 2025-08-25 RX ADMIN — FENTANYL CITRATE 25 MCG: 50 INJECTION, SOLUTION INTRAMUSCULAR; INTRAVENOUS at 10:09

## 2025-08-25 RX ADMIN — LIDOCAINE HYDROCHLORIDE 60 MG: 20 INJECTION, SOLUTION INFILTRATION; PERINEURAL at 09:19

## 2025-08-25 RX ADMIN — FENTANYL CITRATE 50 MCG: 50 INJECTION INTRAMUSCULAR; INTRAVENOUS at 10:20

## 2025-08-25 RX ADMIN — CELECOXIB 400 MG: 200 CAPSULE ORAL at 08:15

## 2025-08-25 RX ADMIN — PHENAZOPYRIDINE 200 MG: 100 TABLET ORAL at 08:15

## 2025-08-25 RX ADMIN — DEXAMETHASONE SODIUM PHOSPHATE 4 MG: 4 INJECTION, SOLUTION INTRAMUSCULAR; INTRAVENOUS at 09:29

## 2025-08-25 RX ADMIN — FENTANYL CITRATE 50 MCG: 50 INJECTION, SOLUTION INTRAMUSCULAR; INTRAVENOUS at 09:19

## 2025-08-25 RX ADMIN — SODIUM CHLORIDE, POTASSIUM CHLORIDE, SODIUM LACTATE AND CALCIUM CHLORIDE: 600; 310; 30; 20 INJECTION, SOLUTION INTRAVENOUS at 09:11

## 2025-08-25 RX ADMIN — ONDANSETRON 4 MG: 2 INJECTION, SOLUTION INTRAMUSCULAR; INTRAVENOUS at 10:00

## 2025-08-25 RX ADMIN — PROPOFOL 150 MG: 10 INJECTION, EMULSION INTRAVENOUS at 09:19

## 2025-08-25 RX ADMIN — CEFAZOLIN SODIUM 2 G: 2 INJECTION, SOLUTION INTRAVENOUS at 09:23

## 2025-08-25 RX ADMIN — ONDANSETRON 4 MG: 2 INJECTION, SOLUTION INTRAMUSCULAR; INTRAVENOUS at 10:30

## 2025-08-25 RX ADMIN — ACETAMINOPHEN 975 MG: 325 TABLET ORAL at 08:15

## 2025-08-25 SDOH — HEALTH STABILITY: MENTAL HEALTH: CURRENT SMOKER: 0

## 2025-08-25 ASSESSMENT — PAIN DESCRIPTION - DESCRIPTORS
DESCRIPTORS: ACHING;SORE
DESCRIPTORS: SORE
DESCRIPTORS: SORE
DESCRIPTORS: ACHING;SORE
DESCRIPTORS: ACHING;SORE

## 2025-08-25 ASSESSMENT — PAIN SCALES - GENERAL
PAIN_LEVEL: 2
PAINLEVEL_OUTOF10: 1
PAINLEVEL_OUTOF10: 1
PAINLEVEL_OUTOF10: 4
PAINLEVEL_OUTOF10: 0 - NO PAIN
PAINLEVEL_OUTOF10: 1
PAINLEVEL_OUTOF10: 1

## 2025-08-25 ASSESSMENT — PAIN - FUNCTIONAL ASSESSMENT
PAIN_FUNCTIONAL_ASSESSMENT: 0-10

## 2025-08-25 ASSESSMENT — PAIN DESCRIPTION - LOCATION: LOCATION: VAGINA

## 2025-08-29 LAB
25(OH)D3+25(OH)D2 SERPL-MCNC: 39 NG/ML (ref 30–100)
CHOLEST SERPL-MCNC: 164 MG/DL
CHOLEST/HDLC SERPL: 2.3 (CALC)
HDLC SERPL-MCNC: 70 MG/DL
LDLC SERPL CALC-MCNC: 80 MG/DL (CALC)
NONHDLC SERPL-MCNC: 94 MG/DL (CALC)
PREALB SERPL-MCNC: NORMAL
T3FREE SERPL-MCNC: 3.8 PG/ML (ref 2.3–4.2)
T4 FREE SERPL-MCNC: 1.4 NG/DL (ref 0.8–1.8)
TRIGL SERPL-MCNC: 56 MG/DL
TSH SERPL-ACNC: 1.52 MIU/L

## 2025-09-02 LAB
25(OH)D3+25(OH)D2 SERPL-MCNC: 39 NG/ML (ref 30–100)
CHOLEST SERPL-MCNC: 164 MG/DL
CHOLEST/HDLC SERPL: 2.3 (CALC)
HDLC SERPL-MCNC: 70 MG/DL
LDLC SERPL CALC-MCNC: 80 MG/DL (CALC)
NONHDLC SERPL-MCNC: 94 MG/DL (CALC)
PREALB SERPL-MCNC: 21 MG/DL (ref 17–34)
T3FREE SERPL-MCNC: 3.8 PG/ML (ref 2.3–4.2)
T4 FREE SERPL-MCNC: 1.4 NG/DL (ref 0.8–1.8)
TRIGL SERPL-MCNC: 56 MG/DL
TSH SERPL-ACNC: 1.52 MIU/L

## 2025-09-16 ENCOUNTER — APPOINTMENT (OUTPATIENT)
Dept: OBSTETRICS AND GYNECOLOGY | Facility: CLINIC | Age: 50
End: 2025-09-16
Payer: COMMERCIAL

## 2025-10-31 ENCOUNTER — APPOINTMENT (OUTPATIENT)
Dept: OBSTETRICS AND GYNECOLOGY | Facility: CLINIC | Age: 50
End: 2025-10-31
Payer: COMMERCIAL

## 2025-11-04 ENCOUNTER — APPOINTMENT (OUTPATIENT)
Dept: DERMATOLOGY | Facility: CLINIC | Age: 50
End: 2025-11-04
Payer: COMMERCIAL

## (undated) DEVICE — GLOVE, SURGICAL, PROTEXIS PI , 6.5, PF, LF

## (undated) DEVICE — SUTURE, VICRYL, 0, 27 IN, UR-6, VIOLET

## (undated) DEVICE — ACCESS SYS, KII SHIELDED BLADED, Z-THREAD, 12X100CM

## (undated) DEVICE — Device

## (undated) DEVICE — SYRINGE, 10 CC, LUER LOCK

## (undated) DEVICE — TUBE SET, PNEUMOCLEAR, SMOKE EVACU, HIGH-FLOW

## (undated) DEVICE — CARE KIT, LAPAROSCOPIC, ADVANCED

## (undated) DEVICE — SUTURE, VICRYL, 3-0, 27 IN, SH-1, VIOLET

## (undated) DEVICE — SUTURE, VICRYL, 4-0, 18 IN, PS2, UNDYED

## (undated) DEVICE — SCISSOR, MINI ENDO CUT, TIPS, DISP

## (undated) DEVICE — ENDO, PORT 5/12 VISIPORT W/FIXIATION CANNULA 176674PF

## (undated) DEVICE — GLOVE, PROTEXIS PI CLASSIC, SZ-6.5, PF, LF

## (undated) DEVICE — APPLICATOR, CHLORAPREP, W/ORANGE TINT, 26ML

## (undated) DEVICE — SUTURE, VICRYL, 3-0, 27 IN, SH

## (undated) DEVICE — TRAY, FOLEY, ADVANCE, 16FR, SILICONE, W/STATLOCK

## (undated) DEVICE — TUBING, SUCTION, 9 FT, STERILE, CLEAR

## (undated) DEVICE — SOLUTION, IRRIGATION, USP, SODIUM CHLORIDE 0.9%, 3000 ML, BAG

## (undated) DEVICE — GLOVE, SURGICAL, PROTEXIS PI BLUE W/NEUTHERA, 7.0, PF, LF

## (undated) DEVICE — CABLE, ELECTROSURGICAL, MONOPOLAR, LAPAROSCOPIC, 10 FT, LF

## (undated) DEVICE — NEEDLE, SPINAL, 22 G X 1.5 IN, BLACK HUB

## (undated) DEVICE — STRIP, SKIN CLOSURE, STERI STRIP, REINFORCED, 0.5 X 4 IN

## (undated) DEVICE — CATHETER TRAY, URETHRAL, FOLEY, 16 FR, SILICONE

## (undated) DEVICE — CONTAINER, SPECIMEN, 4 OZ, OR PEEL PACK, STERILE

## (undated) DEVICE — NEEDLE, HYPO, 22G X 1 1/2IN, ECLIP, LF

## (undated) DEVICE — PREP TRAY, VAGINAL

## (undated) DEVICE — SUTURE, VICRYL, 2-0, 27 IN, SH, UNDYED

## (undated) DEVICE — DRAPE, INSTRUMENT, W/POUCH, STERI DRAPE, 9 5/8 X 18 LONG

## (undated) DEVICE — ADHESIVE, SKIN, DERMABOND ADVANCED, 15CM, PEN-STYLE

## (undated) DEVICE — APPLICATOR, FOAM BARRIER, LARGE

## (undated) DEVICE — TIP, SUCTION, YANKAUER, BULB, ADULT

## (undated) DEVICE — DISSECTOR, ULTRASONIC, SONICISION, CORDLESS, 39CM, CRVD JAW

## (undated) DEVICE — DRAPE PACK, LAPAROSCOPIC CHOLECYSTECTOMY, CUSTOM, GEAUGA

## (undated) DEVICE — ENDO, CLIP, 5MM, M/L